# Patient Record
Sex: FEMALE | Race: WHITE | Employment: FULL TIME | URBAN - METROPOLITAN AREA
[De-identification: names, ages, dates, MRNs, and addresses within clinical notes are randomized per-mention and may not be internally consistent; named-entity substitution may affect disease eponyms.]

---

## 2017-01-12 ENCOUNTER — OFFICE VISIT (OUTPATIENT)
Dept: FAMILY MEDICINE CLINIC | Facility: CLINIC | Age: 57
End: 2017-01-12

## 2017-01-12 VITALS
TEMPERATURE: 98 F | BODY MASS INDEX: 43 KG/M2 | HEART RATE: 67 BPM | SYSTOLIC BLOOD PRESSURE: 144 MMHG | DIASTOLIC BLOOD PRESSURE: 98 MMHG | WEIGHT: 211 LBS

## 2017-01-12 DIAGNOSIS — J22 ACUTE RESPIRATORY INFECTION: Primary | ICD-10-CM

## 2017-01-12 DIAGNOSIS — H66.001 ACUTE SUPPURATIVE OTITIS MEDIA OF RIGHT EAR WITHOUT SPONTANEOUS RUPTURE OF TYMPANIC MEMBRANE, RECURRENCE NOT SPECIFIED: ICD-10-CM

## 2017-01-12 PROCEDURE — 99213 OFFICE O/P EST LOW 20 MIN: CPT | Performed by: FAMILY MEDICINE

## 2017-01-12 PROCEDURE — 99212 OFFICE O/P EST SF 10 MIN: CPT | Performed by: FAMILY MEDICINE

## 2017-01-12 RX ORDER — AZITHROMYCIN 250 MG/1
TABLET, FILM COATED ORAL
Qty: 6 TABLET | Refills: 0 | Status: SHIPPED | OUTPATIENT
Start: 2017-01-12 | End: 2017-03-09

## 2017-01-12 RX ORDER — CODEINE PHOSPHATE AND GUAIFENESIN 10; 100 MG/5ML; MG/5ML
5 SOLUTION ORAL EVERY 6 HOURS PRN
Qty: 120 ML | Refills: 1 | Status: SHIPPED | OUTPATIENT
Start: 2017-01-12 | End: 2017-03-09

## 2017-01-12 NOTE — PROGRESS NOTES
2017 1:15 PM    Brittany Jones, : 1960  Patient presents with:  Cough: Patient c/o cough for the past 2 weeks.  Sx include ear congested, chest congestion, sinus pressure, dry cough      HPI:     Brittany Jones is a 55year old female who presents fo Past Surgical History      46       Social History:     Social History   Marital Status:   Spouse Name: N/A    Years of Education: N/A  Number of Children: N/A     Occupational History  None on file     Social History Main Topics Oral Tab, Take two tablets by mouth today, then one tablet daily. , Disp: 6 tablet, Rfl: 0  •  guaiFENesin-codeine (CHERATUSSIN AC) 100-10 MG/5ML Oral Solution, Take 5 mL by mouth every 6 (six) hours as needed for cough. , Disp: 120 mL, Rfl: 1.       Francis Montanez

## 2017-03-09 ENCOUNTER — OFFICE VISIT (OUTPATIENT)
Dept: FAMILY MEDICINE CLINIC | Facility: CLINIC | Age: 57
End: 2017-03-09

## 2017-03-09 VITALS
TEMPERATURE: 98 F | WEIGHT: 205.81 LBS | DIASTOLIC BLOOD PRESSURE: 92 MMHG | SYSTOLIC BLOOD PRESSURE: 142 MMHG | HEIGHT: 59 IN | RESPIRATION RATE: 14 BRPM | HEART RATE: 63 BPM | BODY MASS INDEX: 41.49 KG/M2

## 2017-03-09 DIAGNOSIS — J32.9 SINUSITIS, UNSPECIFIED CHRONICITY, UNSPECIFIED LOCATION: Primary | ICD-10-CM

## 2017-03-09 PROCEDURE — 99212 OFFICE O/P EST SF 10 MIN: CPT | Performed by: FAMILY MEDICINE

## 2017-03-09 PROCEDURE — 99213 OFFICE O/P EST LOW 20 MIN: CPT | Performed by: FAMILY MEDICINE

## 2017-03-09 RX ORDER — FLUTICASONE PROPIONATE 50 MCG
2 SPRAY, SUSPENSION (ML) NASAL DAILY
Qty: 1 BOTTLE | Refills: 0 | Status: SHIPPED | OUTPATIENT
Start: 2017-03-09 | End: 2017-07-07

## 2017-03-09 RX ORDER — AMOXICILLIN AND CLAVULANATE POTASSIUM 875; 125 MG/1; MG/1
1 TABLET, FILM COATED ORAL 2 TIMES DAILY
Qty: 20 TABLET | Refills: 0 | Status: SHIPPED | OUTPATIENT
Start: 2017-03-09 | End: 2017-03-19

## 2017-03-09 NOTE — PROGRESS NOTES
Patient ID: Any Pineda is a 55year old female. HPI  Patient presents with:  Sinusitis: right side   Ear Pain: right side     She states this started at least 5-6 days ago but now the color mucus from the nose is thick and yellow.   It is stuck most th Neurological: Patient is alert and oriented to person, place, and time. Psychiatric: Patient has a normal mood and affect. Vitals reviewed.          ASSESSMENT/PLAN:     Diagnoses and all orders for this visit:    Sinusitis, unspecified chronicity, un

## 2017-09-25 ENCOUNTER — OFFICE VISIT (OUTPATIENT)
Dept: FAMILY MEDICINE CLINIC | Facility: CLINIC | Age: 57
End: 2017-09-25

## 2017-09-25 VITALS
DIASTOLIC BLOOD PRESSURE: 92 MMHG | HEIGHT: 59 IN | TEMPERATURE: 99 F | HEART RATE: 78 BPM | BODY MASS INDEX: 40.52 KG/M2 | WEIGHT: 201 LBS | SYSTOLIC BLOOD PRESSURE: 132 MMHG

## 2017-09-25 DIAGNOSIS — J06.9 UPPER RESPIRATORY TRACT INFECTION, UNSPECIFIED TYPE: Primary | ICD-10-CM

## 2017-09-25 DIAGNOSIS — R05.9 COUGH: ICD-10-CM

## 2017-09-25 DIAGNOSIS — J30.89 OTHER ALLERGIC RHINITIS: ICD-10-CM

## 2017-09-25 PROCEDURE — 99212 OFFICE O/P EST SF 10 MIN: CPT | Performed by: FAMILY MEDICINE

## 2017-09-25 PROCEDURE — 99213 OFFICE O/P EST LOW 20 MIN: CPT | Performed by: FAMILY MEDICINE

## 2017-09-25 RX ORDER — PROMETHAZINE HYDROCHLORIDE AND CODEINE PHOSPHATE 6.25; 1 MG/5ML; MG/5ML
5 SYRUP ORAL EVERY 6 HOURS PRN
Qty: 180 ML | Refills: 0 | Status: SHIPPED | OUTPATIENT
Start: 2017-09-25 | End: 2017-11-28

## 2017-09-25 RX ORDER — FLUTICASONE PROPIONATE 50 MCG
2 SPRAY, SUSPENSION (ML) NASAL DAILY
Qty: 1 BOTTLE | Refills: 3 | Status: SHIPPED | OUTPATIENT
Start: 2017-09-25 | End: 2018-01-23

## 2017-09-25 RX ORDER — DEXAMETHASONE 4 MG/1
4 TABLET ORAL
Qty: 5 TABLET | Refills: 0 | Status: SHIPPED | OUTPATIENT
Start: 2017-09-25 | End: 2017-09-30

## 2017-09-25 NOTE — PROGRESS NOTES
Patient ID: Andrew Chu is a 65year old female. HPI  Patient presents with:  Cold  Cough    She has been sick since 9/21/2017. She states she went to visit her daughter at the hospital and ear congestion was born cold air on her.   She thinks this is Oral    Weight: 201 lb (91.2 kg)    Height: 4' 11\" (1.499 m)      Physical Exam   Constitutional: Patient is oriented to person, place, and time. Patient appears well-developed and well-nourished. No distress. HENT:   Head: Normocephalic.    Right Ear: T breakfast. Best taken at breakfast or lunch. -     Fluticasone Propionate 50 MCG/ACT Nasal Suspension; 2 sprays by Nasal route daily. Cough  -     promethazine-codeine 6.25-10 MG/5ML Oral Syrup;  Take 5 mL by mouth every 6 (six) hours as needed for coug

## 2017-11-28 ENCOUNTER — OFFICE VISIT (OUTPATIENT)
Dept: FAMILY MEDICINE CLINIC | Facility: CLINIC | Age: 57
End: 2017-11-28

## 2017-11-28 ENCOUNTER — APPOINTMENT (OUTPATIENT)
Dept: LAB | Age: 57
End: 2017-11-28
Attending: FAMILY MEDICINE
Payer: COMMERCIAL

## 2017-11-28 ENCOUNTER — HOSPITAL ENCOUNTER (OUTPATIENT)
Dept: GENERAL RADIOLOGY | Age: 57
Discharge: HOME | End: 2017-11-28
Attending: FAMILY MEDICINE
Payer: COMMERCIAL

## 2017-11-28 VITALS
DIASTOLIC BLOOD PRESSURE: 94 MMHG | RESPIRATION RATE: 14 BRPM | BODY MASS INDEX: 40.04 KG/M2 | SYSTOLIC BLOOD PRESSURE: 144 MMHG | HEART RATE: 87 BPM | HEIGHT: 59 IN | TEMPERATURE: 99 F | WEIGHT: 198.63 LBS

## 2017-11-28 DIAGNOSIS — M54.2 POSTERIOR NECK PAIN: ICD-10-CM

## 2017-11-28 DIAGNOSIS — E66.01 MORBID OBESITY DUE TO EXCESS CALORIES (HCC): ICD-10-CM

## 2017-11-28 DIAGNOSIS — Z87.19 HISTORY OF DIVERTICULOSIS: ICD-10-CM

## 2017-11-28 DIAGNOSIS — I10 ESSENTIAL HYPERTENSION: ICD-10-CM

## 2017-11-28 DIAGNOSIS — Z00.00 ADULT GENERAL MEDICAL EXAM: Primary | ICD-10-CM

## 2017-11-28 DIAGNOSIS — Z12.11 SCREENING FOR COLON CANCER: ICD-10-CM

## 2017-11-28 DIAGNOSIS — K63.5 POLYP OF COLON, UNSPECIFIED PART OF COLON, UNSPECIFIED TYPE: ICD-10-CM

## 2017-11-28 DIAGNOSIS — Z23 NEED FOR VACCINATION: ICD-10-CM

## 2017-11-28 PROCEDURE — 90471 IMMUNIZATION ADMIN: CPT | Performed by: FAMILY MEDICINE

## 2017-11-28 PROCEDURE — 93005 ELECTROCARDIOGRAM TRACING: CPT

## 2017-11-28 PROCEDURE — 72050 X-RAY EXAM NECK SPINE 4/5VWS: CPT | Performed by: FAMILY MEDICINE

## 2017-11-28 PROCEDURE — 99396 PREV VISIT EST AGE 40-64: CPT | Performed by: FAMILY MEDICINE

## 2017-11-28 PROCEDURE — 71020 XR CHEST PA + LAT CHEST (CPT=71020): CPT | Performed by: FAMILY MEDICINE

## 2017-11-28 PROCEDURE — 99213 OFFICE O/P EST LOW 20 MIN: CPT | Performed by: FAMILY MEDICINE

## 2017-11-28 PROCEDURE — 93010 ELECTROCARDIOGRAM REPORT: CPT | Performed by: FAMILY MEDICINE

## 2017-11-28 PROCEDURE — 90715 TDAP VACCINE 7 YRS/> IM: CPT | Performed by: FAMILY MEDICINE

## 2017-11-28 RX ORDER — HYDROCHLOROTHIAZIDE 12.5 MG/1
12.5 TABLET ORAL DAILY
Qty: 90 TABLET | Refills: 1 | Status: SHIPPED | OUTPATIENT
Start: 2017-11-28 | End: 2018-05-12

## 2017-11-28 NOTE — PROGRESS NOTES
Patient ID: Leslie Peres is a 65year old female. HPI  Patient presents with:  Routine Physical    Dr. Ariane Chambers did her colonoscopy in 2012. She had 2 colon polyps and diverticulosis. She sees Dr. Nuria Reyes for gynecology.   She had her mammogram and Pap sm nervous/anxious. History reviewed. No pertinent past medical history.     Past Surgical History:  1993:       Social History  Social History   Marital status:   Spouse name: N/A    Years of education: N/A  Number of children: N/A place, and time . She has normal reflexes. No cranial nerve deficit. Skin: Skin is warm and dry. No rash noted. Psychiatric: She has a normal mood and affect  No edema of the legs  Vitals reviewed.     Blood pressure (!) 158/109, pulse 87, temperature 9 Future  She is more worried that she probably has a tumor. She states she gets to be a hypochondriac at times. I told her I do not feel any type of mass and I think this is more likely muscular in nature we will go ahead and do an x-ray of the neck.

## 2017-12-06 ENCOUNTER — TELEPHONE (OUTPATIENT)
Dept: OTHER | Age: 57
End: 2017-12-06

## 2017-12-06 NOTE — TELEPHONE ENCOUNTER
----- Message from Kiersten Lorenzo DO sent at 12/5/2017 10:33 PM CST -----  EKG shows some nonspecific changes but T-wave flattening can sometimes mean that your heart not getting enough oxygen or it could just be a false positive finding.   The cardiologist

## 2017-12-07 NOTE — TELEPHONE ENCOUNTER
Pt states did see EKG result and VS' message on [employer]. Provided pt with phone 3 to call for cardiology appt and pt states will do so. Denies further questions/concerns at this time.

## 2017-12-13 ENCOUNTER — APPOINTMENT (OUTPATIENT)
Dept: GASTROENTEROLOGY | Facility: CLINIC | Age: 57
End: 2017-12-13

## 2017-12-13 DIAGNOSIS — Z86.010 HISTORY OF COLON POLYPS: ICD-10-CM

## 2017-12-13 DIAGNOSIS — Z12.11 COLON CANCER SCREENING: Primary | ICD-10-CM

## 2017-12-13 NOTE — PROGRESS NOTES
Forwarded to [facility]. This pt is not a screening. She previously had colonoscopy with Dr Montana Lim 10/29/12 and had one hyperplastic polyp and one tubular adenoma with a 5 yr recall. She has constipation and has only 2 stools per week.  Pt instructe No      Question: Do you have a history of CVA or Tia? Answer:   No      Question: Do you have a history of uncontrolled HTN, CHF or MI? Answer:   No      Question: Do you have a history of home oxygen use?    Answer:   No      Question: Do you have a h

## 2017-12-18 ENCOUNTER — TELEPHONE (OUTPATIENT)
Dept: OTHER | Age: 57
End: 2017-12-18

## 2017-12-18 NOTE — TELEPHONE ENCOUNTER
LMTCB. May transfer to Triage. Noted pt read result note on 12/16/17 at 4:10 pm--want to make sure has no questions and assist with f/u appt.

## 2017-12-18 NOTE — TELEPHONE ENCOUNTER
----- Message from Cydney Alarcon DO sent at 12/16/2017  4:05 PM CST -----  Unfortunately your diabetic. You are spilling quite a bit of sugar in the urine. Your hemoglobin A1c is above 10. Your sugar is in the 300s.   Your hemoglobin and hematocrit are

## 2018-01-03 ENCOUNTER — HOSPITAL ENCOUNTER (EMERGENCY)
Facility: HOSPITAL | Age: 58
Discharge: HOME | End: 2018-01-03
Payer: COMMERCIAL

## 2018-01-03 ENCOUNTER — TELEPHONE (OUTPATIENT)
Dept: OTHER | Age: 58
End: 2018-01-03

## 2018-01-03 ENCOUNTER — APPOINTMENT (OUTPATIENT)
Dept: GENERAL RADIOLOGY | Facility: HOSPITAL | Age: 58
End: 2018-01-03
Attending: NURSE PRACTITIONER
Payer: COMMERCIAL

## 2018-01-03 ENCOUNTER — APPOINTMENT (OUTPATIENT)
Dept: GENERAL RADIOLOGY | Facility: HOSPITAL | Age: 58
End: 2018-01-03
Payer: COMMERCIAL

## 2018-01-03 VITALS
HEART RATE: 75 BPM | OXYGEN SATURATION: 97 % | SYSTOLIC BLOOD PRESSURE: 147 MMHG | TEMPERATURE: 98 F | HEIGHT: 59 IN | BODY MASS INDEX: 39.92 KG/M2 | DIASTOLIC BLOOD PRESSURE: 97 MMHG | WEIGHT: 198 LBS | RESPIRATION RATE: 18 BRPM

## 2018-01-03 DIAGNOSIS — M25.511 ACUTE PAIN OF RIGHT SHOULDER: Primary | ICD-10-CM

## 2018-01-03 PROCEDURE — 99284 EMERGENCY DEPT VISIT MOD MDM: CPT

## 2018-01-03 PROCEDURE — 96372 THER/PROPH/DIAG INJ SC/IM: CPT

## 2018-01-03 PROCEDURE — 71046 X-RAY EXAM CHEST 2 VIEWS: CPT | Performed by: NURSE PRACTITIONER

## 2018-01-03 PROCEDURE — 73030 X-RAY EXAM OF SHOULDER: CPT

## 2018-01-03 RX ORDER — HYDROCODONE BITARTRATE AND ACETAMINOPHEN 5; 325 MG/1; MG/1
1-2 TABLET ORAL EVERY 4 HOURS PRN
Qty: 15 TABLET | Refills: 0 | Status: SHIPPED | OUTPATIENT
Start: 2018-01-03 | End: 2018-01-10

## 2018-01-03 RX ORDER — KETOROLAC TROMETHAMINE 30 MG/ML
60 INJECTION, SOLUTION INTRAMUSCULAR; INTRAVENOUS ONCE
Status: COMPLETED | OUTPATIENT
Start: 2018-01-03 | End: 2018-01-03

## 2018-01-03 RX ORDER — HYDROCODONE BITARTRATE AND ACETAMINOPHEN 5; 325 MG/1; MG/1
2 TABLET ORAL ONCE
Status: COMPLETED | OUTPATIENT
Start: 2018-01-03 | End: 2018-01-03

## 2018-01-03 NOTE — ED NOTES
Pt c/o right shoulder pain that started on Sunday, worse with movement. No relief s/p 800 mg ibuprofen. Denies injury or trauma, but states went bowling the night before.

## 2018-01-03 NOTE — ED PROVIDER NOTES
Patient Seen in: [facility] AND [facility] Emergency Department    History   Patient presents with:  Upper Extremity Injury (musculoskeletal)    Stated Complaint: R arm \"soreness\" x3 days; no trauma    HPI    Patient presents into the emergency room for evalua the right upper extremity: No clavicular tenderness or step-offs. There is exquisite tenderness to palpation to the anterior aspect of the right shoulder as well as the right deltoid. There is no edema erythema or warmth.   No palpable tenderness to the u

## 2018-01-03 NOTE — TELEPHONE ENCOUNTER
MARYLU pt has appt on 1-5-18  ----- Message from Burak Pino DO sent at 1/3/2018 11:50 AM CST -----  It looks like you have a little calcific tendinitis in her shoulder. Also have some arthritic changes.   They also saw a 1 cm nodule in the right lower

## 2018-01-03 NOTE — ED INITIAL ASSESSMENT (HPI)
Patient presents to ER with c/o right shoulder pain that began Sunday. Patient reports that she did bowl the night before. Patient is unable to lift her arm. Pain worse with movement.

## 2018-01-04 ENCOUNTER — OFFICE VISIT (OUTPATIENT)
Dept: FAMILY MEDICINE CLINIC | Facility: CLINIC | Age: 58
End: 2018-01-04

## 2018-01-04 VITALS
WEIGHT: 198 LBS | DIASTOLIC BLOOD PRESSURE: 96 MMHG | HEIGHT: 59 IN | TEMPERATURE: 99 F | SYSTOLIC BLOOD PRESSURE: 150 MMHG | HEART RATE: 70 BPM | BODY MASS INDEX: 39.92 KG/M2

## 2018-01-04 DIAGNOSIS — M75.31 CALCIFIC TENDINITIS OF RIGHT SHOULDER: ICD-10-CM

## 2018-01-04 DIAGNOSIS — M25.511 PAIN IN JOINT OF RIGHT SHOULDER: ICD-10-CM

## 2018-01-04 DIAGNOSIS — I10 ESSENTIAL HYPERTENSION: ICD-10-CM

## 2018-01-04 DIAGNOSIS — M25.511 ACUTE PAIN OF RIGHT SHOULDER: Primary | ICD-10-CM

## 2018-01-04 PROCEDURE — 20610 DRAIN/INJ JOINT/BURSA W/O US: CPT | Performed by: FAMILY MEDICINE

## 2018-01-04 PROCEDURE — 99214 OFFICE O/P EST MOD 30 MIN: CPT | Performed by: FAMILY MEDICINE

## 2018-01-04 RX ORDER — TRIAMCINOLONE ACETONIDE 40 MG/ML
80 INJECTION, SUSPENSION INTRA-ARTICULAR; INTRAMUSCULAR ONCE
Status: COMPLETED | OUTPATIENT
Start: 2018-01-04 | End: 2018-01-04

## 2018-01-04 RX ADMIN — TRIAMCINOLONE ACETONIDE 80 MG: 40 INJECTION, SUSPENSION INTRA-ARTICULAR; INTRAMUSCULAR at 14:02:00

## 2018-01-04 NOTE — TELEPHONE ENCOUNTER
Pt was inform of  message below and verbalized understanding. She is ok with just coming in for her shoulder pain.  She will see  today at 1 pm

## 2018-01-04 NOTE — TELEPHONE ENCOUNTER
Please advised on Appt for tomorrow Pt stated she's still in Pain was in hospital but no relief  Pt has an Appt for Friday but do not want to wait that long      Spoke with patient (identified name and ), results reviewed and agrees with plan.

## 2018-01-04 NOTE — PROGRESS NOTES
Patient ID: Angelia Grandchild is a 65year old female.     HPI  Patient presents with:  ER F/U: [facility]-Shoulder Pain     Patient Result Comments     Written by Miguel Wilkerson DO on 1/3/2018 11:50 AM   It looks like you have a little calcific tendinitis in her shou Last 6 Encounters:  01/04/18 : 198 lb (89.8 kg)  01/03/18 : 198 lb (89.8 kg)  11/28/17 : 198 lb 9.6 oz (90.1 kg)  09/25/17 : 201 lb (91.2 kg)  03/09/17 : 205 lb 12.8 oz (93.4 kg)  01/12/17 : 211 lb (95.7 kg)      Body mass index is 39.99 kg/m².     BP Readi decreased   Forward Flexion decreased   IR  decreased   ER  decreased   Atrophy None   Bruising None   Strength     Unable to check strength today. She does not want to move her shoulder.   Even if she tries to move 5 or 10° she starts complaining of cooper Her  has ibuprofen 800 mg at home that he does not use. She can take this 3 times daily with food.   I taught her some exercises to do just to keep the range of motion going as she does want to wear the shoulder sling for comfort but I told her not

## 2018-01-04 NOTE — TELEPHONE ENCOUNTER
I am already booked but have her come at 1 PM but it may just be a visit for the shoulder pain and a possible steroid injection into the shoulder.   That usually helps calcific tendinitis the most.  She will need to make a different appointment and to discu

## 2018-01-22 ENCOUNTER — OFFICE VISIT (OUTPATIENT)
Dept: FAMILY MEDICINE CLINIC | Facility: CLINIC | Age: 58
End: 2018-01-22

## 2018-01-22 VITALS
SYSTOLIC BLOOD PRESSURE: 130 MMHG | HEART RATE: 78 BPM | BODY MASS INDEX: 38.51 KG/M2 | TEMPERATURE: 99 F | WEIGHT: 191 LBS | HEIGHT: 59 IN | DIASTOLIC BLOOD PRESSURE: 92 MMHG

## 2018-01-22 DIAGNOSIS — N95.0 POST-MENOPAUSAL BLEEDING: ICD-10-CM

## 2018-01-22 DIAGNOSIS — I10 ESSENTIAL HYPERTENSION: Primary | ICD-10-CM

## 2018-01-22 DIAGNOSIS — E11.65 UNCONTROLLED TYPE 2 DIABETES MELLITUS WITH HYPERGLYCEMIA, WITHOUT LONG-TERM CURRENT USE OF INSULIN (HCC): ICD-10-CM

## 2018-01-22 DIAGNOSIS — M75.31 CALCIFIC TENDINITIS OF RIGHT SHOULDER: ICD-10-CM

## 2018-01-22 DIAGNOSIS — N39.3 URINARY, INCONTINENCE, STRESS FEMALE: ICD-10-CM

## 2018-01-22 PROCEDURE — 99212 OFFICE O/P EST SF 10 MIN: CPT | Performed by: FAMILY MEDICINE

## 2018-01-22 PROCEDURE — 99215 OFFICE O/P EST HI 40 MIN: CPT | Performed by: FAMILY MEDICINE

## 2018-01-22 RX ORDER — LISINOPRIL 5 MG/1
5 TABLET ORAL DAILY
Qty: 30 TABLET | Refills: 3 | Status: SHIPPED | OUTPATIENT
Start: 2018-01-22 | End: 2018-05-30

## 2018-01-22 RX ORDER — HYDROCODONE BITARTRATE AND ACETAMINOPHEN 5; 325 MG/1; MG/1
TABLET ORAL
Refills: 0 | COMMUNITY
Start: 2018-01-03 | End: 2018-02-24

## 2018-01-22 NOTE — PATIENT INSTRUCTIONS
Do labs the first or second week of March at [address] and take your medications with water but no food at morning.

## 2018-01-22 NOTE — PROGRESS NOTES
Patient ID: Karli Ocampo is a 65year old female. HPI  Patient presents with: Follow - Up  Cold    She is here to follow for blood pressure. We did a steroid shot last time for her calcific tendinitis. She is doing well with regard to that.   Her bloo with meals. For diabetes. Disp: 180 tablet Rfl: 1   atorvastatin (LIPITOR) 20 MG Oral Tab Take 1 tablet (20 mg total) by mouth nightly. For cholesterol.  Disp: 90 tablet Rfl: 1   hydrochlorothiazide 12.5 MG Oral Tab Take 1 tablet (12.5 mg total) by mouth da exercises but she would like to see a urologist as well to see if she needs a bladder sling or suspension   Post-menopausal bleeding  she is to see Dr. Sheryl Patel again to find out why she continues to have the postmenopausal bleeding.   Uncontrolled type 2 diab

## 2018-02-01 ENCOUNTER — HOSPITAL ENCOUNTER (OUTPATIENT)
Dept: MAMMOGRAPHY | Age: 58
Discharge: HOME | End: 2018-02-01
Attending: OBSTETRICS & GYNECOLOGY
Payer: COMMERCIAL

## 2018-02-01 DIAGNOSIS — Z12.31 ENCOUNTER FOR SCREENING MAMMOGRAM FOR MALIGNANT NEOPLASM OF BREAST: ICD-10-CM

## 2018-02-01 PROCEDURE — 77067 SCR MAMMO BI INCL CAD: CPT | Performed by: OBSTETRICS & GYNECOLOGY

## 2018-02-24 NOTE — PAT NURSING NOTE
Pt instructed to call Dr King Thomas to verify medication list. Metformin  Is listed as a medication she was supposed to be taking. She states she has never been told to start thid medication.

## 2018-02-28 ENCOUNTER — SURGERY (OUTPATIENT)
Age: 58
End: 2018-02-28

## 2018-02-28 ENCOUNTER — ANESTHESIA (OUTPATIENT)
Dept: SURGERY | Facility: HOSPITAL | Age: 58
End: 2018-02-28
Payer: COMMERCIAL

## 2018-02-28 ENCOUNTER — HOSPITAL ENCOUNTER (OUTPATIENT)
Facility: HOSPITAL | Age: 58
Setting detail: HOSPITAL OUTPATIENT SURGERY
Discharge: HOME | End: 2018-02-28
Attending: OBSTETRICS & GYNECOLOGY | Admitting: OBSTETRICS & GYNECOLOGY
Payer: COMMERCIAL

## 2018-02-28 ENCOUNTER — ANESTHESIA EVENT (OUTPATIENT)
Dept: SURGERY | Facility: HOSPITAL | Age: 58
End: 2018-02-28
Payer: COMMERCIAL

## 2018-02-28 VITALS
SYSTOLIC BLOOD PRESSURE: 133 MMHG | OXYGEN SATURATION: 98 % | DIASTOLIC BLOOD PRESSURE: 85 MMHG | TEMPERATURE: 97 F | HEART RATE: 58 BPM | WEIGHT: 187 LBS | RESPIRATION RATE: 16 BRPM | BODY MASS INDEX: 37.7 KG/M2 | HEIGHT: 59 IN

## 2018-02-28 DIAGNOSIS — N95.0 POST-MENOPAUSAL BLEEDING: Primary | ICD-10-CM

## 2018-02-28 LAB
GLUCOSE BLDC GLUCOMTR-MCNC: 195 MG/DL (ref 70–99)
GLUCOSE BLDC GLUCOMTR-MCNC: 199 MG/DL (ref 70–99)

## 2018-02-28 PROCEDURE — 0UJD8ZZ INSPECTION OF UTERUS AND CERVIX, VIA NATURAL OR ARTIFICIAL OPENING ENDOSCOPIC: ICD-10-PCS | Performed by: OBSTETRICS & GYNECOLOGY

## 2018-02-28 PROCEDURE — 88305 TISSUE EXAM BY PATHOLOGIST: CPT | Performed by: OBSTETRICS & GYNECOLOGY

## 2018-02-28 PROCEDURE — 82962 GLUCOSE BLOOD TEST: CPT

## 2018-02-28 PROCEDURE — 0UDB7ZX EXTRACTION OF ENDOMETRIUM, VIA NATURAL OR ARTIFICIAL OPENING, DIAGNOSTIC: ICD-10-PCS | Performed by: OBSTETRICS & GYNECOLOGY

## 2018-02-28 RX ORDER — IBUPROFEN 400 MG/1
400 TABLET ORAL EVERY 4 HOURS PRN
Status: DISCONTINUED | OUTPATIENT
Start: 2018-02-28 | End: 2018-02-28

## 2018-02-28 RX ORDER — HYDROCODONE BITARTRATE AND ACETAMINOPHEN 5; 325 MG/1; MG/1
1 TABLET ORAL AS NEEDED
Status: DISCONTINUED | OUTPATIENT
Start: 2018-02-28 | End: 2018-02-28

## 2018-02-28 RX ORDER — MORPHINE SULFATE 4 MG/ML
4 INJECTION, SOLUTION INTRAMUSCULAR; INTRAVENOUS EVERY 10 MIN PRN
Status: DISCONTINUED | OUTPATIENT
Start: 2018-02-28 | End: 2018-02-28

## 2018-02-28 RX ORDER — LIDOCAINE HYDROCHLORIDE 10 MG/ML
INJECTION, SOLUTION EPIDURAL; INFILTRATION; INTRACAUDAL; PERINEURAL AS NEEDED
Status: DISCONTINUED | OUTPATIENT
Start: 2018-02-28 | End: 2018-02-28 | Stop reason: SURG

## 2018-02-28 RX ORDER — DEXAMETHASONE SODIUM PHOSPHATE 4 MG/ML
VIAL (ML) INJECTION AS NEEDED
Status: DISCONTINUED | OUTPATIENT
Start: 2018-02-28 | End: 2018-02-28 | Stop reason: SURG

## 2018-02-28 RX ORDER — FAMOTIDINE 20 MG/1
20 TABLET ORAL ONCE
Status: COMPLETED | OUTPATIENT
Start: 2018-02-28 | End: 2018-02-28

## 2018-02-28 RX ORDER — SODIUM CHLORIDE, SODIUM LACTATE, POTASSIUM CHLORIDE, CALCIUM CHLORIDE 600; 310; 30; 20 MG/100ML; MG/100ML; MG/100ML; MG/100ML
INJECTION, SOLUTION INTRAVENOUS CONTINUOUS
Status: DISCONTINUED | OUTPATIENT
Start: 2018-02-28 | End: 2018-02-28

## 2018-02-28 RX ORDER — METOCLOPRAMIDE 10 MG/1
10 TABLET ORAL ONCE
Status: COMPLETED | OUTPATIENT
Start: 2018-02-28 | End: 2018-02-28

## 2018-02-28 RX ORDER — ONDANSETRON 2 MG/ML
4 INJECTION INTRAMUSCULAR; INTRAVENOUS ONCE AS NEEDED
Status: DISCONTINUED | OUTPATIENT
Start: 2018-02-28 | End: 2018-02-28

## 2018-02-28 RX ORDER — SCOLOPAMINE TRANSDERMAL SYSTEM 1 MG/1
1 PATCH, EXTENDED RELEASE TRANSDERMAL
Status: DISCONTINUED | OUTPATIENT
Start: 2018-02-28 | End: 2018-03-03 | Stop reason: HOSPADM

## 2018-02-28 RX ORDER — MIDAZOLAM HYDROCHLORIDE 1 MG/ML
INJECTION INTRAMUSCULAR; INTRAVENOUS AS NEEDED
Status: DISCONTINUED | OUTPATIENT
Start: 2018-02-28 | End: 2018-02-28 | Stop reason: SURG

## 2018-02-28 RX ORDER — IBUPROFEN 200 MG
200 TABLET ORAL EVERY 4 HOURS PRN
Status: DISCONTINUED | OUTPATIENT
Start: 2018-02-28 | End: 2018-02-28

## 2018-02-28 RX ORDER — HYDROCODONE BITARTRATE AND ACETAMINOPHEN 5; 325 MG/1; MG/1
2 TABLET ORAL EVERY 4 HOURS PRN
Status: DISCONTINUED | OUTPATIENT
Start: 2018-02-28 | End: 2018-02-28

## 2018-02-28 RX ORDER — MORPHINE SULFATE 2 MG/ML
2 INJECTION, SOLUTION INTRAMUSCULAR; INTRAVENOUS EVERY 10 MIN PRN
Status: DISCONTINUED | OUTPATIENT
Start: 2018-02-28 | End: 2018-02-28

## 2018-02-28 RX ORDER — ACETAMINOPHEN 325 MG/1
650 TABLET ORAL EVERY 4 HOURS PRN
Status: DISCONTINUED | OUTPATIENT
Start: 2018-02-28 | End: 2018-02-28

## 2018-02-28 RX ORDER — ONDANSETRON 2 MG/ML
4 INJECTION INTRAMUSCULAR; INTRAVENOUS EVERY 6 HOURS PRN
Status: DISCONTINUED | OUTPATIENT
Start: 2018-02-28 | End: 2018-02-28

## 2018-02-28 RX ORDER — HALOPERIDOL 5 MG/ML
0.25 INJECTION INTRAMUSCULAR ONCE AS NEEDED
Status: DISCONTINUED | OUTPATIENT
Start: 2018-02-28 | End: 2018-02-28

## 2018-02-28 RX ORDER — HYDROCODONE BITARTRATE AND ACETAMINOPHEN 5; 325 MG/1; MG/1
1 TABLET ORAL EVERY 4 HOURS PRN
Status: DISCONTINUED | OUTPATIENT
Start: 2018-02-28 | End: 2018-02-28

## 2018-02-28 RX ORDER — HYDROCODONE BITARTRATE AND ACETAMINOPHEN 5; 325 MG/1; MG/1
2 TABLET ORAL AS NEEDED
Status: DISCONTINUED | OUTPATIENT
Start: 2018-02-28 | End: 2018-02-28

## 2018-02-28 RX ORDER — ONDANSETRON 2 MG/ML
INJECTION INTRAMUSCULAR; INTRAVENOUS AS NEEDED
Status: DISCONTINUED | OUTPATIENT
Start: 2018-02-28 | End: 2018-02-28 | Stop reason: SURG

## 2018-02-28 RX ORDER — ACETAMINOPHEN 500 MG
1000 TABLET ORAL ONCE
Status: COMPLETED | OUTPATIENT
Start: 2018-02-28 | End: 2018-02-28

## 2018-02-28 RX ORDER — DEXTROSE MONOHYDRATE 25 G/50ML
50 INJECTION, SOLUTION INTRAVENOUS
Status: DISCONTINUED | OUTPATIENT
Start: 2018-02-28 | End: 2018-02-28

## 2018-02-28 RX ORDER — MORPHINE SULFATE 10 MG/ML
6 INJECTION, SOLUTION INTRAMUSCULAR; INTRAVENOUS EVERY 10 MIN PRN
Status: DISCONTINUED | OUTPATIENT
Start: 2018-02-28 | End: 2018-02-28

## 2018-02-28 RX ORDER — IBUPROFEN 600 MG/1
600 TABLET ORAL EVERY 4 HOURS PRN
Status: DISCONTINUED | OUTPATIENT
Start: 2018-02-28 | End: 2018-02-28

## 2018-02-28 RX ORDER — NALOXONE HYDROCHLORIDE 0.4 MG/ML
80 INJECTION, SOLUTION INTRAMUSCULAR; INTRAVENOUS; SUBCUTANEOUS AS NEEDED
Status: DISCONTINUED | OUTPATIENT
Start: 2018-02-28 | End: 2018-02-28

## 2018-02-28 RX ORDER — METOCLOPRAMIDE HYDROCHLORIDE 5 MG/ML
10 INJECTION INTRAMUSCULAR; INTRAVENOUS EVERY 6 HOURS PRN
Status: DISCONTINUED | OUTPATIENT
Start: 2018-02-28 | End: 2018-02-28

## 2018-02-28 RX ORDER — KETOROLAC TROMETHAMINE 30 MG/ML
INJECTION, SOLUTION INTRAMUSCULAR; INTRAVENOUS AS NEEDED
Status: DISCONTINUED | OUTPATIENT
Start: 2018-02-28 | End: 2018-02-28 | Stop reason: SURG

## 2018-02-28 RX ADMIN — MIDAZOLAM HYDROCHLORIDE 2 MG: 1 INJECTION INTRAMUSCULAR; INTRAVENOUS at 13:59:00

## 2018-02-28 RX ADMIN — DEXAMETHASONE SODIUM PHOSPHATE 8 MG: 4 MG/ML VIAL (ML) INJECTION at 14:11:00

## 2018-02-28 RX ADMIN — SODIUM CHLORIDE, SODIUM LACTATE, POTASSIUM CHLORIDE, CALCIUM CHLORIDE: 600; 310; 30; 20 INJECTION, SOLUTION INTRAVENOUS at 14:02:00

## 2018-02-28 RX ADMIN — KETOROLAC TROMETHAMINE 30 MG: 30 INJECTION, SOLUTION INTRAMUSCULAR; INTRAVENOUS at 14:27:00

## 2018-02-28 RX ADMIN — ONDANSETRON 4 MG: 2 INJECTION INTRAMUSCULAR; INTRAVENOUS at 14:27:00

## 2018-02-28 RX ADMIN — LIDOCAINE HYDROCHLORIDE 50 MG: 10 INJECTION, SOLUTION EPIDURAL; INFILTRATION; INTRACAUDAL; PERINEURAL at 14:06:00

## 2018-02-28 RX ADMIN — SODIUM CHLORIDE, SODIUM LACTATE, POTASSIUM CHLORIDE, CALCIUM CHLORIDE: 600; 310; 30; 20 INJECTION, SOLUTION INTRAVENOUS at 14:32:00

## 2018-02-28 NOTE — ANESTHESIA POSTPROCEDURE EVALUATION
Patient: Yusuf Cooper    Procedure Summary     Date:  02/28/18 Room / Location:  [address] [place] [address] MAIN OR 01 / [address] [facility] MAIN OR    Anesthesia Start:  5557 Anesthesia Stop:  8701    Procedure:  HYSTEROSCOPY DILATION AND CURETTAGE (N/A Vagina ) Diagnosis:  (post menopausal ble

## 2018-02-28 NOTE — H&P
[facility]    PATIENT'S NAME: Jeanette Henson   ATTENDING PHYSICIAN: aGrrick Reed.  Blayne Bernal MD   PATIENT ACCOUNT#:   [id number]    LOCATION:  [facility]  MEDICAL RECORD #:   [id number]       YOB: 1960  ADMISSION DATE:       02/28/2018    HIS Weight 194 pounds. HEENT:  Within normal limits. NECK:  No lymphadenopathy or thyromegaly. LUNGS:  Clear to auscultation bilaterally. HEART:  Regular rate and rhythm without murmurs. ABDOMEN:  Soft, nontender. No masses or organomegaly.   BACK:  No sp

## 2018-02-28 NOTE — OPERATIVE REPORT
[facility] POST ANESTHESIA CARE UNIT  Operative Note     Jorge A Pillai Location: OR   [facility] [id number] MRN [id number]   Admission Date 2/28/2018 Operation Date 2/28/2018   Attending Physician Kayley Modi MD Operating Physician Ilya Mccloud MD Dino Heaton MD  2/28/2018  2:41 PM

## 2018-02-28 NOTE — INTERVAL H&P NOTE
Pre-op Diagnosis: post menopausal bleeding    The above referenced H&P was reviewed by Ra Hinojosa MD on 2/28/2018, the patient was examined and no significant changes have occurred in the patient's condition since the H&P was performed.   I discussed wi

## 2018-02-28 NOTE — ANESTHESIA PREPROCEDURE EVALUATION
Anesthesia PreOp Note    HPI:     Tiffany Kwon is a 65year old female who presents for preoperative consultation requested by: Malka Cardenas MD    Date of Surgery: 2/28/2018    Procedure(s):   HYSTEROSCOPY DILATION AND CURETTAGE  Indication: post menopaus Ordered in Epic:  lactated ringers infusion  Intravenous Continuous Mustapha Estes MD Last Rate: 20 mL/hr at 02/28/18 1340   scopolamine (TRANSDERM-SCOP) 1.5 mg patch 1 patch Transdermal Q72H Marco Young MD 1 patch at 02/28/18 1400     No current Epic- Anesthesia ROS/Med Hx and Physical Exam     Patient summary reviewed and Nursing notes reviewed    Airway   Mallampati: II  TM distance: >3 FB  Neck ROM: full  Dental      Pulmonary - negative ROS and normal exam   Cardiovascular - normal exam  (+) hyperte

## 2018-05-12 DIAGNOSIS — I10 ESSENTIAL HYPERTENSION: ICD-10-CM

## 2018-05-15 RX ORDER — HYDROCHLOROTHIAZIDE 12.5 MG/1
12.5 TABLET ORAL DAILY
Qty: 90 TABLET | Refills: 0 | Status: SHIPPED | OUTPATIENT
Start: 2018-05-15 | End: 2018-08-23

## 2018-05-15 NOTE — TELEPHONE ENCOUNTER
Hypertensive Medications: Refilled per protocol    Protocol Criteria:  · Appointment scheduled in the past 6 months or in the next 3 months  · BMP or CMP in the past 12 months  · Creatinine result < 2  Recent Outpatient Visits            3 months ago [facility]

## 2018-05-30 DIAGNOSIS — I10 ESSENTIAL HYPERTENSION: ICD-10-CM

## 2018-05-30 DIAGNOSIS — E11.65 UNCONTROLLED TYPE 2 DIABETES MELLITUS WITH HYPERGLYCEMIA, WITHOUT LONG-TERM CURRENT USE OF INSULIN (HCC): ICD-10-CM

## 2018-05-31 RX ORDER — LISINOPRIL 5 MG/1
5 TABLET ORAL DAILY
Qty: 30 TABLET | Refills: 3 | Status: SHIPPED | OUTPATIENT
Start: 2018-05-31 | End: 2018-08-23

## 2018-08-20 DIAGNOSIS — I10 ESSENTIAL HYPERTENSION: ICD-10-CM

## 2018-08-20 RX ORDER — HYDROCHLOROTHIAZIDE 12.5 MG/1
12.5 TABLET ORAL DAILY
Qty: 90 TABLET | Refills: 0 | Status: CANCELLED | OUTPATIENT
Start: 2018-08-20

## 2018-08-20 NOTE — TELEPHONE ENCOUNTER
CSS please contact patient to make appt for BP check and route message back to triageRN when the appt is made

## 2018-08-23 ENCOUNTER — OFFICE VISIT (OUTPATIENT)
Dept: FAMILY MEDICINE CLINIC | Facility: CLINIC | Age: 58
End: 2018-08-23
Payer: COMMERCIAL

## 2018-08-23 VITALS
HEART RATE: 75 BPM | HEIGHT: 59 IN | BODY MASS INDEX: 38.3 KG/M2 | TEMPERATURE: 98 F | SYSTOLIC BLOOD PRESSURE: 126 MMHG | WEIGHT: 190 LBS | DIASTOLIC BLOOD PRESSURE: 84 MMHG

## 2018-08-23 DIAGNOSIS — E66.01 MORBID OBESITY DUE TO EXCESS CALORIES (HCC): ICD-10-CM

## 2018-08-23 DIAGNOSIS — Z23 NEED FOR VACCINATION: ICD-10-CM

## 2018-08-23 DIAGNOSIS — E11.65 UNCONTROLLED TYPE 2 DIABETES MELLITUS WITH HYPERGLYCEMIA, WITHOUT LONG-TERM CURRENT USE OF INSULIN (HCC): Primary | ICD-10-CM

## 2018-08-23 DIAGNOSIS — R94.31 ABNORMAL EKG: ICD-10-CM

## 2018-08-23 DIAGNOSIS — I10 ESSENTIAL HYPERTENSION: ICD-10-CM

## 2018-08-23 DIAGNOSIS — E11.65 UNCONTROLLED TYPE 2 DIABETES MELLITUS WITH MICROALBUMINURIA, WITHOUT LONG-TERM CURRENT USE OF INSULIN (HCC): ICD-10-CM

## 2018-08-23 DIAGNOSIS — E11.29 UNCONTROLLED TYPE 2 DIABETES MELLITUS WITH MICROALBUMINURIA, WITHOUT LONG-TERM CURRENT USE OF INSULIN (HCC): ICD-10-CM

## 2018-08-23 DIAGNOSIS — E78.2 MIXED HYPERLIPIDEMIA: ICD-10-CM

## 2018-08-23 DIAGNOSIS — R80.9 UNCONTROLLED TYPE 2 DIABETES MELLITUS WITH MICROALBUMINURIA, WITHOUT LONG-TERM CURRENT USE OF INSULIN (HCC): ICD-10-CM

## 2018-08-23 PROCEDURE — 90732 PPSV23 VACC 2 YRS+ SUBQ/IM: CPT | Performed by: FAMILY MEDICINE

## 2018-08-23 PROCEDURE — 99212 OFFICE O/P EST SF 10 MIN: CPT | Performed by: FAMILY MEDICINE

## 2018-08-23 PROCEDURE — 99215 OFFICE O/P EST HI 40 MIN: CPT | Performed by: FAMILY MEDICINE

## 2018-08-23 PROCEDURE — 90471 IMMUNIZATION ADMIN: CPT | Performed by: FAMILY MEDICINE

## 2018-08-23 RX ORDER — LISINOPRIL 5 MG/1
5 TABLET ORAL DAILY
Qty: 90 TABLET | Refills: 1 | Status: SHIPPED | OUTPATIENT
Start: 2018-08-23 | End: 2019-03-13

## 2018-08-23 RX ORDER — HYDROCHLOROTHIAZIDE 12.5 MG/1
12.5 TABLET ORAL DAILY
Qty: 90 TABLET | Refills: 1 | Status: SHIPPED | OUTPATIENT
Start: 2018-08-23 | End: 2019-03-13

## 2018-08-23 NOTE — PROGRESS NOTES
Patient ID: Tasneem Moulton is a 65year old female.     HPI  Patient presents with:  Medication Request    Patient Result Comments     Viewed by Tasneem Moulton on 12/16/2017  4:13 PM   Written by Sherley Cotton DO on 12/16/2017  4:05 PM   Unfortunately your sayra WBC 6.0 12/14/2017   RBC 5.29 (H) 12/14/2017   HGB 15.6 (H) 12/14/2017   HCT 45.7 (H) 12/14/2017    12/14/2017   MCV 86.4 12/14/2017   MCH 29.5 12/14/2017   MCHC 34.1 12/14/2017   RDW 12.5 12/14/2017   NEUTABS 3,510 12/14/2017   LYMPHABS 1,728 12/ Bert 161 (H) 12/14/2017   CHOLNELLI 5.2 (H) 12/14/2017       TSH W/REFLEX TO FT4 (mIU/L)   Date Value   12/14/2017 3.89   ----------    No results found for: B12, VITB12    No results found for: IRON, IRONTOT    No results found for: SAT    No resu oriented to person, place, and time. Patient appears well-developed and well-nourished. HENT:   Mouth/Throat: Mucous membranes are normal.   Neck: Normal range of motion. Neck supple.  No thyromegaly   Cardiovascular: Normal rate, regular rhythm and nataly PANEL (8)  -     MICROALB/CREAT RATIO, RANDOM URINE  -     CBC WITH DIFFERENTIAL WITH PLATELET  -     HEMOGLOBIN A1C  -     LIPID PANEL  -     OPHTHALMOLOGY - INTERNAL  -     CARDIO - INTERNAL    Mixed hyperlipidemia  -     CARDIO - INTERNAL    Essential h

## 2018-08-28 LAB
ALT: 29 U/L (ref 6–29)
AST: 17 U/L (ref 10–35)
BUN: 15 MG/DL (ref 7–25)
CALCIUM: 9.7 MG/DL (ref 8.6–10.4)
CARBON DIOXIDE: 30 MMOL/L (ref 20–32)
CHLORIDE: 102 MMOL/L (ref 98–110)
CHOL/HDLC RATIO: 4.9 (CALC)
CHOLESTEROL, TOTAL: 219 MG/DL
CREATININE, RANDOM URINE: 217 MG/DL (ref 20–320)
CREATININE: 0.7 MG/DL (ref 0.5–1.05)
EGFR IF AFRICN AM: 112 ML/MIN/1.73M2
EGFR IF NONAFRICN AM: 97 ML/MIN/1.73M2
GLUCOSE: 255 MG/DL (ref 65–99)
HDL CHOLESTEROL: 45 MG/DL
HEMOGLOBIN A1C: 9.3 % OF TOTAL HGB
LDL-CHOLESTEROL: 142 MG/DL (CALC)
MICROALBUMIN/CREATININE RATIO, RANDOM URINE: 3 MCG/MG CREAT
MICROALBUMIN: 0.7 MG/DL
NON-HDL CHOLESTEROL: 174 MG/DL (CALC)
POTASSIUM: 4.8 MMOL/L (ref 3.5–5.3)
SODIUM: 140 MMOL/L (ref 135–146)
TRIGLYCERIDES: 186 MG/DL

## 2018-09-05 ENCOUNTER — TELEPHONE (OUTPATIENT)
Dept: OTHER | Age: 58
End: 2018-09-05

## 2018-09-05 NOTE — TELEPHONE ENCOUNTER
Pt called in for results. Spoke with patient (verified name and ), reviewed information, patient verbalized understanding and agrees with plan.    Reviewed diet modification such as reducing carbohydrates, eating smaller portions and choosing high fiber

## 2018-09-05 NOTE — TELEPHONE ENCOUNTER
Patient Result Comments     Written by Lizbeth Gutiérrez DO on 8/29/2018  5:41 PM   Liver tests are okay. [place] test okay but sugar is much too high at 255.  Your hemoglobin A1c is above 9 and we want this less than 7.  Your cholesterol is way too high for

## 2018-09-10 ENCOUNTER — NURSE TRIAGE (OUTPATIENT)
Dept: FAMILY MEDICINE CLINIC | Facility: CLINIC | Age: 58
End: 2018-09-10

## 2018-09-10 NOTE — TELEPHONE ENCOUNTER
Action Requested: Summary for Provider     []  Critical Lab, Recommendations Needed  [] Need Additional Advice  [x]   FYI    []   Need Orders  [] Need Medications Sent to Pharmacy  []  Other     SUMMARY: Per protocol advised ER now and pt states will be

## 2018-09-11 ENCOUNTER — OFFICE VISIT (OUTPATIENT)
Dept: FAMILY MEDICINE CLINIC | Facility: CLINIC | Age: 58
End: 2018-09-11
Payer: COMMERCIAL

## 2018-09-11 ENCOUNTER — HOSPITAL ENCOUNTER (OUTPATIENT)
Dept: GENERAL RADIOLOGY | Age: 58
Discharge: HOME | End: 2018-09-11
Attending: FAMILY MEDICINE
Payer: COMMERCIAL

## 2018-09-11 VITALS
BODY MASS INDEX: 38.18 KG/M2 | WEIGHT: 189.38 LBS | HEART RATE: 104 BPM | SYSTOLIC BLOOD PRESSURE: 131 MMHG | HEIGHT: 59 IN | TEMPERATURE: 98 F | DIASTOLIC BLOOD PRESSURE: 94 MMHG | RESPIRATION RATE: 14 BRPM

## 2018-09-11 DIAGNOSIS — M25.612 DECREASED RANGE OF MOTION OF LEFT SHOULDER: ICD-10-CM

## 2018-09-11 DIAGNOSIS — M25.512 PAIN IN JOINT OF LEFT SHOULDER: ICD-10-CM

## 2018-09-11 DIAGNOSIS — M25.512 ACUTE PAIN OF LEFT SHOULDER: Primary | ICD-10-CM

## 2018-09-11 DIAGNOSIS — M25.512 ACUTE PAIN OF LEFT SHOULDER: ICD-10-CM

## 2018-09-11 DIAGNOSIS — E66.01 MORBID OBESITY DUE TO EXCESS CALORIES (HCC): ICD-10-CM

## 2018-09-11 PROCEDURE — 73030 X-RAY EXAM OF SHOULDER: CPT | Performed by: FAMILY MEDICINE

## 2018-09-11 PROCEDURE — 99214 OFFICE O/P EST MOD 30 MIN: CPT | Performed by: FAMILY MEDICINE

## 2018-09-11 PROCEDURE — 99212 OFFICE O/P EST SF 10 MIN: CPT | Performed by: FAMILY MEDICINE

## 2018-09-11 PROCEDURE — 20610 DRAIN/INJ JOINT/BURSA W/O US: CPT | Performed by: FAMILY MEDICINE

## 2018-09-11 RX ORDER — TRIAMCINOLONE ACETONIDE 40 MG/ML
80 INJECTION, SUSPENSION INTRA-ARTICULAR; INTRAMUSCULAR ONCE
Status: DISCONTINUED | OUTPATIENT
Start: 2018-09-11 | End: 2018-09-11

## 2018-09-11 NOTE — TELEPHONE ENCOUNTER
I do not see that she went to our emergency room. If she is still having the symptoms please have her see me before 11:20 AM on Tuesday if possible as it looks like I have numerous openings. I do have a meeting at noon.

## 2018-09-11 NOTE — PROGRESS NOTES
Patient ID: Dionicio Webb is a 63year old female.     HPI  Patient presents with:  Pain: left shoulder     Ron Madison RN   Registered Nurse      Telephone Encounter   Signed   Encounter Date:  9/10/2018               Signed                 Action 09/11/18 : 189 lb 6.4 oz (85.9 kg)  08/23/18 : 190 lb (86.2 kg)  02/28/18 : 187 lb (84.8 kg)  01/22/18 : 191 lb (86.6 kg)  01/04/18 : 198 lb (89.8 kg)  01/03/18 : 198 lb (89.8 kg)      BMI Readings from Last 6 Encounters:  09/11/18 : 38.25 kg/m²  08/23/18 Constitutional: Patient is oriented to person, place, and time. Patient appears well-developed and well-nourished. No distress. Vitals have been reviewed.     Blood pressure (!) 131/94, pulse 104, temperature 98.2 °F (36.8 °C), temperature source Oral, ==================================================================================================================================================               ASSESSMENT/PLAN:     Diagnoses and all orders for this visit:    Acute pain of left shoulder  -

## 2018-09-21 ENCOUNTER — OFFICE VISIT (OUTPATIENT)
Dept: OPTOMETRY | Facility: CLINIC | Age: 58
End: 2018-09-21
Payer: COMMERCIAL

## 2018-09-21 DIAGNOSIS — H25.13 AGE-RELATED NUCLEAR CATARACT OF BOTH EYES: ICD-10-CM

## 2018-09-21 DIAGNOSIS — H52.4 PRESBYOPIA: ICD-10-CM

## 2018-09-21 DIAGNOSIS — E11.9 CONTROLLED TYPE 2 DIABETES MELLITUS WITHOUT COMPLICATION, WITHOUT LONG-TERM CURRENT USE OF INSULIN (HCC): Primary | ICD-10-CM

## 2018-09-21 PROCEDURE — 92004 COMPRE OPH EXAM NEW PT 1/>: CPT | Performed by: OPTOMETRIST

## 2018-09-21 NOTE — PROGRESS NOTES
Yusuf Cooper is a 63year old female. HPI:     HPI     Diabetic Eye Exam     Diabetes characteristics include Type 2, controlled with diet and taking oral medications. Duration of 1 month. Number of years on pills: 1 month.   Number of years on insulin 1       Allergies:    Iodine (Topical)        RASH    ROS:     ROS     Negative for: Constitutional, Gastrointestinal, Neurological, Skin, Genitourinary, Musculoskeletal, HENT, Endocrine, Cardiovascular, Eyes, Respiratory, Psychiatric, Allergic/Imm, Heme/L Manifest Refraction Comments    Recommend she stay with her +1.75                  ASSESSMENT/PLAN:     Diagnoses and Plan:     Age-related nuclear cataract of both eyes  No treatment is required. Will continue to observe.     Controlled type 2 diabet

## 2018-10-17 ENCOUNTER — OFFICE VISIT (OUTPATIENT)
Dept: CARDIOLOGY CLINIC | Facility: CLINIC | Age: 58
End: 2018-10-17
Payer: COMMERCIAL

## 2018-10-17 VITALS
SYSTOLIC BLOOD PRESSURE: 121 MMHG | RESPIRATION RATE: 16 BRPM | BODY MASS INDEX: 38.3 KG/M2 | HEIGHT: 59 IN | HEART RATE: 86 BPM | WEIGHT: 190 LBS | DIASTOLIC BLOOD PRESSURE: 87 MMHG

## 2018-10-17 DIAGNOSIS — I10 ESSENTIAL HYPERTENSION: ICD-10-CM

## 2018-10-17 DIAGNOSIS — E11.65 UNCONTROLLED TYPE 2 DIABETES MELLITUS WITH HYPERGLYCEMIA, WITHOUT LONG-TERM CURRENT USE OF INSULIN (HCC): Primary | ICD-10-CM

## 2018-10-17 DIAGNOSIS — R94.31 ABNORMAL EKG: ICD-10-CM

## 2018-10-17 DIAGNOSIS — E78.2 MIXED HYPERLIPIDEMIA: ICD-10-CM

## 2018-10-17 PROCEDURE — 99212 OFFICE O/P EST SF 10 MIN: CPT | Performed by: INTERNAL MEDICINE

## 2018-10-17 PROCEDURE — 99245 OFF/OP CONSLTJ NEW/EST HI 55: CPT | Performed by: INTERNAL MEDICINE

## 2018-10-17 NOTE — PROGRESS NOTES
Cardiology Consult Note    10/17/2018    Dionicio Webb is a 63year old female.   HPI:   64-year-old female with recently diagnosed diabetes severely elevated sugars A1c of 9.3 on metformin also history of hyperlipidemia and hypertension presents for initial well otherwise  SKIN: denies any unusual skin lesions or rashes  RESPIRATORY: denies shortness of breath with exertion  CARDIOVASCULAR: See HPI  GI: denies abdominal pain and denies heartburn  NEURO: denies headaches  All other systems reviewed and negativ

## 2018-10-18 ENCOUNTER — TELEPHONE (OUTPATIENT)
Dept: CARDIOLOGY CLINIC | Facility: CLINIC | Age: 58
End: 2018-10-18

## 2018-10-22 NOTE — TELEPHONE ENCOUNTER
CARD NUC EXERCISE STRESS TEST (CPT=93017/19282) [[id number]]     Dx: Abnormal EKG [R94.31 (ICD-10-CM)];  Essential hypertension [I10 (ICD-10-CM)]

## 2018-10-23 ENCOUNTER — MED REC SCAN ONLY (OUTPATIENT)
Dept: FAMILY MEDICINE CLINIC | Facility: CLINIC | Age: 58
End: 2018-10-23

## 2018-10-25 ENCOUNTER — OFFICE VISIT (OUTPATIENT)
Dept: SURGERY | Facility: CLINIC | Age: 58
End: 2018-10-25
Payer: COMMERCIAL

## 2018-10-25 ENCOUNTER — TELEPHONE (OUTPATIENT)
Dept: SURGERY | Facility: CLINIC | Age: 58
End: 2018-10-25

## 2018-10-25 VITALS
OXYGEN SATURATION: 100 % | RESPIRATION RATE: 18 BRPM | BODY MASS INDEX: 38.32 KG/M2 | DIASTOLIC BLOOD PRESSURE: 83 MMHG | WEIGHT: 190.06 LBS | SYSTOLIC BLOOD PRESSURE: 130 MMHG | HEART RATE: 86 BPM | HEIGHT: 59 IN

## 2018-10-25 DIAGNOSIS — I10 ESSENTIAL HYPERTENSION: ICD-10-CM

## 2018-10-25 DIAGNOSIS — E11.65 UNCONTROLLED TYPE 2 DIABETES MELLITUS WITH HYPERGLYCEMIA, WITHOUT LONG-TERM CURRENT USE OF INSULIN (HCC): Primary | ICD-10-CM

## 2018-10-25 DIAGNOSIS — E78.2 HYPERCHOLESTEROLEMIA WITH HYPERTRIGLYCERIDEMIA: ICD-10-CM

## 2018-10-25 DIAGNOSIS — E66.9 OBESITY (BMI 30-39.9): ICD-10-CM

## 2018-10-25 PROCEDURE — 99244 OFF/OP CNSLTJ NEW/EST MOD 40: CPT | Performed by: INTERNAL MEDICINE

## 2018-10-25 NOTE — PROGRESS NOTES
[facility] Consultation [facility]       Date of Consult:  10/25/2018    Patient:  Jorge A Pillai  :      1960  MRN:      [id number]    Referring Provider: Dr. Nancy Chavez       Chief Complaint:  Patient presents with:  Consult: non surgical taking differently: Take 1,000 mg by mouth 2 (two) times daily with meals. Just taking in the morning  For diabetes. ) Disp: 180 tablet Rfl: 1   atorvastatin (LIPITOR) 20 MG Oral Tab Take 1 tablet (20 mg total) by mouth nightly. For cholesterol.  Disp: 90 t file    Surgical History:    Past Surgical History:   Procedure Laterality Date   •      • DILATION/CURETTAGE,DIAGNOSTIC     • HYSTEROSCOPY DILATION AND CURETTAGE N/A 2018    Performed by Shereen Arriola MD at [address] [place] [address] OR   • REDUCTION LE Fundi benign. Lungs: clear to auscultation bilaterally  Heart: S1, S2 normal, no murmur, click, rub or gallop, regular rate and rhythm  Abdomen: firm, obese, non tender.  large pannus  Extremities: extremities normal, atraumatic, no cyanosis or edema  Puls soda.  3. Increase activity-upper body exercises, walk 10 minutes per day. 4. Increase fruit and vegetable servings to 5-6 per day.       Will start victoza     Continue metformin    Careful with holiday eating    Diagnoses and all orders for this visit:

## 2018-10-25 NOTE — TELEPHONE ENCOUNTER
Brooke Lee referred Geraldo Vargas to Josephine Thrasher can we schedule her with Bunny Mabry. I will be out so can you or Shiva Bronson please call her to schedule.  Thank you

## 2018-10-25 NOTE — TELEPHONE ENCOUNTER
S/W Sam Norwood, verified that no pre authorization is needed per Lisa HANSON, with today  date and time, as reference. Message left on phone and My Chart to schedule test, [phone].

## 2018-10-30 ENCOUNTER — HOSPITAL ENCOUNTER (OUTPATIENT)
Dept: CV DIAGNOSTICS | Facility: HOSPITAL | Age: 58
Discharge: HOME | End: 2018-10-30
Attending: INTERNAL MEDICINE
Payer: COMMERCIAL

## 2018-10-30 ENCOUNTER — HOSPITAL ENCOUNTER (OUTPATIENT)
Dept: NUCLEAR MEDICINE | Facility: HOSPITAL | Age: 58
Discharge: HOME | End: 2018-10-30
Attending: INTERNAL MEDICINE
Payer: COMMERCIAL

## 2018-10-30 DIAGNOSIS — I10 ESSENTIAL HYPERTENSION: ICD-10-CM

## 2018-10-30 DIAGNOSIS — R94.31 ABNORMAL EKG: ICD-10-CM

## 2018-10-30 PROCEDURE — 78452 HT MUSCLE IMAGE SPECT MULT: CPT | Performed by: INTERNAL MEDICINE

## 2018-10-30 PROCEDURE — 93018 CV STRESS TEST I&R ONLY: CPT | Performed by: INTERNAL MEDICINE

## 2018-10-30 PROCEDURE — 93017 CV STRESS TEST TRACING ONLY: CPT | Performed by: INTERNAL MEDICINE

## 2018-10-30 PROCEDURE — 93016 CV STRESS TEST SUPVJ ONLY: CPT | Performed by: INTERNAL MEDICINE

## 2018-10-30 RX ORDER — SODIUM CHLORIDE 9 MG/ML
INJECTION, SOLUTION INTRAVENOUS
Status: COMPLETED
Start: 2018-10-30 | End: 2018-10-30

## 2018-10-30 RX ADMIN — SODIUM CHLORIDE 100 ML: 9 INJECTION, SOLUTION INTRAVENOUS at 09:18:00

## 2018-11-15 ENCOUNTER — OFFICE VISIT (OUTPATIENT)
Dept: FAMILY MEDICINE CLINIC | Facility: CLINIC | Age: 58
End: 2018-11-15
Payer: COMMERCIAL

## 2018-11-15 VITALS
HEART RATE: 93 BPM | SYSTOLIC BLOOD PRESSURE: 125 MMHG | BODY MASS INDEX: 37.13 KG/M2 | DIASTOLIC BLOOD PRESSURE: 87 MMHG | WEIGHT: 184.19 LBS | HEIGHT: 59 IN | TEMPERATURE: 98 F | RESPIRATION RATE: 12 BRPM

## 2018-11-15 DIAGNOSIS — E78.2 HYPERCHOLESTEROLEMIA WITH HYPERTRIGLYCERIDEMIA: ICD-10-CM

## 2018-11-15 DIAGNOSIS — E11.65 UNCONTROLLED TYPE 2 DIABETES MELLITUS WITH HYPERGLYCEMIA, WITHOUT LONG-TERM CURRENT USE OF INSULIN (HCC): Primary | ICD-10-CM

## 2018-11-15 DIAGNOSIS — M62.838 NECK MUSCLE SPASM: ICD-10-CM

## 2018-11-15 DIAGNOSIS — S16.1XXD STRAIN OF NECK MUSCLE, SUBSEQUENT ENCOUNTER: ICD-10-CM

## 2018-11-15 PROCEDURE — 99212 OFFICE O/P EST SF 10 MIN: CPT | Performed by: FAMILY MEDICINE

## 2018-11-15 PROCEDURE — 99214 OFFICE O/P EST MOD 30 MIN: CPT | Performed by: FAMILY MEDICINE

## 2018-11-15 RX ORDER — CYCLOBENZAPRINE HCL 10 MG
10 TABLET ORAL NIGHTLY
Qty: 20 TABLET | Refills: 0 | Status: SHIPPED | OUTPATIENT
Start: 2018-11-15 | End: 2018-12-05

## 2018-11-15 NOTE — PROGRESS NOTES
Patient ID: Anita Ma is a 63year old female. HPI  Patient presents with:  Diabetes: follow up medications    She is seeing Dr. Lidia Olvera for weight loss. She is been on Victoza for 3 weeks.   In the next 3 days she will increase it to the highest dose 08/27/2018       Lab Results   Component Value Date    COLORUR DARK YELLOW 12/14/2017    CLARITY CLEAR 12/14/2017    SPECGRAVITY 1.038 (H) 12/14/2017    GLUUR 3+ (A) 12/14/2017    BILUR NEGATIVE 12/14/2017    KETUR 1+ (A) 12/14/2017    BLOODURINE NEGATIVE Respiratory: Negative for chest tightness and shortness of breath. Cardiovascular: Negative for chest pain and leg swelling. Neurological: Negative for dizziness, syncope, facial asymmetry, light-headedness, numbness and headaches.          Past Medi pressure 125/87, pulse 93, temperature 97.8 °F (36.6 °C), temperature source Oral, resp. rate 12, height 4' 11\" (1.499 m), weight 184 lb 3.2 oz (83.6 kg), not currently breastfeeding.   Physical Exam   Constitutional: Patient is oriented to person, place, Oral Tab; Take 1 tablet (10 mg total) by mouth nightly for 20 days. As needed for muscle relaxation. Can make tired.         Referrals (if applicable)  Orders Placed This Encounter      Diabetic [place]      Physical

## 2018-11-19 ENCOUNTER — OFFICE VISIT (OUTPATIENT)
Dept: SURGERY | Facility: CLINIC | Age: 58
End: 2018-11-19
Payer: COMMERCIAL

## 2018-11-19 VITALS
OXYGEN SATURATION: 100 % | HEART RATE: 93 BPM | HEIGHT: 59 IN | BODY MASS INDEX: 37.5 KG/M2 | RESPIRATION RATE: 16 BRPM | WEIGHT: 186 LBS

## 2018-11-19 DIAGNOSIS — E11.9 CONTROLLED TYPE 2 DIABETES MELLITUS WITHOUT COMPLICATION, WITHOUT LONG-TERM CURRENT USE OF INSULIN (HCC): Primary | ICD-10-CM

## 2018-11-19 DIAGNOSIS — Z51.81 ENCOUNTER FOR THERAPEUTIC DRUG MONITORING: ICD-10-CM

## 2018-11-19 DIAGNOSIS — E78.2 HYPERCHOLESTEROLEMIA WITH HYPERTRIGLYCERIDEMIA: ICD-10-CM

## 2018-11-19 DIAGNOSIS — E66.9 OBESITY (BMI 30-39.9): ICD-10-CM

## 2018-11-19 DIAGNOSIS — I10 ESSENTIAL HYPERTENSION: ICD-10-CM

## 2018-11-19 PROCEDURE — 99214 OFFICE O/P EST MOD 30 MIN: CPT | Performed by: INTERNAL MEDICINE

## 2018-11-19 NOTE — PROGRESS NOTES
[address], [address]  Dept: [phone]       Patient:  Breana Charles  :      1960  MRN:      [id number]    Chief Complaint:  Patient presents with: 1000 MG Oral Tab Take 1 tablet (1,000 mg total) by mouth 2 (two) times daily with meals. For diabetes. (Patient taking differently: Take 1,000 mg by mouth 2 (two) times daily with meals.  Just taking in the morning  For diabetes. ) Disp: 180 tablet Rfl: 1 History Narrative      Not on file    Surgical History:    Past Surgical History:   Procedure Laterality Date   •      • DILATION/CURETTAGE,DIAGNOSTIC     • HYSTEROSCOPY DILATION AND CURETTAGE N/A 2018    Performed by Kush Villatoro MD a negative  Endocrine: negative  All other systems were reviewed and are negative    Physical Exam:   Head: Normocephalic, without obvious abnormality, atraumatic  Eyes: conjunctivae/corneas clear. PERRL, EOM's intact. Fundi benign.   Ears: normal TM's and ex function stable. Lab Results   Component Value Date/Time    CHOLEST 219 (H) 08/27/2018 08:27 AM     (H) 08/27/2018 08:27 AM    HDL 45 (L) 08/27/2018 08:27 AM    TRIG 186 (H) 08/27/2018 08:27 AM       Goals for next month:  1. Keep a food log.   2.

## 2018-12-11 ENCOUNTER — HOSPITAL ENCOUNTER (OUTPATIENT)
Dept: ENDOCRINOLOGY | Facility: HOSPITAL | Age: 58
Discharge: HOME | End: 2018-12-11
Attending: FAMILY MEDICINE
Payer: COMMERCIAL

## 2018-12-11 VITALS — BODY MASS INDEX: 37 KG/M2 | WEIGHT: 181.19 LBS

## 2018-12-11 DIAGNOSIS — E11.9 CONTROLLED TYPE 2 DIABETES MELLITUS WITHOUT COMPLICATION, WITHOUT LONG-TERM CURRENT USE OF INSULIN (HCC): Primary | ICD-10-CM

## 2018-12-11 NOTE — PROGRESS NOTES
Garcíakev Bhagatred  : 1960 attended individual initial assessment for Diabetes Education:    Date: 2018   Start time: 8 End time: 9    HEMOGLOBIN A1c (% of total Hgb)   Date Value   2018 7.1 (H)        Assessment:  Down from 9.4%    Meds:   On low blood sugar (Rule of 15) and actions for lowering high blood glucose levels. Initiated goal setting process and will continue to refine throughout class series. Recommendations:    Monitor blood glucose as directed. Follow Basic Diet Guidelines.

## 2018-12-26 ENCOUNTER — APPOINTMENT (OUTPATIENT)
Dept: ENDOCRINOLOGY | Facility: HOSPITAL | Age: 58
End: 2018-12-26
Attending: FAMILY MEDICINE
Payer: COMMERCIAL

## 2018-12-27 ENCOUNTER — HOSPITAL ENCOUNTER (OUTPATIENT)
Dept: ENDOCRINOLOGY | Facility: HOSPITAL | Age: 58
Discharge: HOME | End: 2018-12-27
Attending: FAMILY MEDICINE
Payer: COMMERCIAL

## 2018-12-27 VITALS — BODY MASS INDEX: 37 KG/M2 | WEIGHT: 184.88 LBS

## 2018-12-27 DIAGNOSIS — E11.9 CONTROLLED TYPE 2 DIABETES MELLITUS WITHOUT COMPLICATION, WITHOUT LONG-TERM CURRENT USE OF INSULIN (HCC): Primary | ICD-10-CM

## 2018-12-27 NOTE — PROGRESS NOTES
Wendy Rubin  : 1960 was seen for Diabetic Education Follow up:    Date: 2018   Start time: 1430 End time: 1500    Assessment:     Assessment: Wt 184 lb 14.4 oz   BMI 37.35 kg/m²      HEMOGLOBIN A1c (% of total Hgb)   Date Value   2018 7

## 2019-01-14 ENCOUNTER — OFFICE VISIT (OUTPATIENT)
Dept: SURGERY | Facility: CLINIC | Age: 59
End: 2019-01-14
Payer: COMMERCIAL

## 2019-01-14 VITALS
HEART RATE: 82 BPM | RESPIRATION RATE: 18 BRPM | DIASTOLIC BLOOD PRESSURE: 76 MMHG | BODY MASS INDEX: 36.5 KG/M2 | OXYGEN SATURATION: 100 % | HEIGHT: 59 IN | WEIGHT: 181.06 LBS | SYSTOLIC BLOOD PRESSURE: 107 MMHG

## 2019-01-14 DIAGNOSIS — E66.9 OBESITY (BMI 30-39.9): ICD-10-CM

## 2019-01-14 DIAGNOSIS — I10 ESSENTIAL HYPERTENSION: ICD-10-CM

## 2019-01-14 DIAGNOSIS — E78.2 HYPERCHOLESTEROLEMIA WITH HYPERTRIGLYCERIDEMIA: ICD-10-CM

## 2019-01-14 DIAGNOSIS — Z51.81 ENCOUNTER FOR THERAPEUTIC DRUG MONITORING: ICD-10-CM

## 2019-01-14 DIAGNOSIS — E11.9 CONTROLLED TYPE 2 DIABETES MELLITUS WITHOUT COMPLICATION, WITHOUT LONG-TERM CURRENT USE OF INSULIN (HCC): Primary | ICD-10-CM

## 2019-01-14 PROCEDURE — 99214 OFFICE O/P EST MOD 30 MIN: CPT | Performed by: INTERNAL MEDICINE

## 2019-01-14 NOTE — PROGRESS NOTES
[address], [place]  [address] [place] [address],[address]  Dept: [phone]       Patient:  Brittany Jones  :      1960  MRN:      [id number]    Chief Complaint:  Patient presents with:       Spouse name: Not on file      Number of children: Not on file      Years of education: Not on file      Highest education level: Not on file    Social Needs      Financial resource strain: Not on file      Food insecurity - worry: Not on file Habits  · Patient states the following:  · Eats 3 meal(s) per day  · Length of time it takes to consume a meal:  20  · # of snacks per day: 1 Type of snacks:  Apple, fruit  · Amount of soda consumption per day:    · Amount of water (in ounces) per day:  64 breakdown or foot ulcers. HYPERTENSION:  The patient's blood pressure has been well controlled. she has been checking it as instructed and has remained in relatively good control.     HYPERCHOLESTEROLEMIA:  The patient states that her cholesterol has be with hypertriglyceridemia    Encounter for therapeutic drug monitoring    Obesity (BMI 30-39. 9)          Arianne Castaneda MD

## 2019-03-02 DIAGNOSIS — E78.2 MIXED HYPERLIPIDEMIA: ICD-10-CM

## 2019-03-02 DIAGNOSIS — E11.65 UNCONTROLLED TYPE 2 DIABETES MELLITUS WITH HYPERGLYCEMIA, WITHOUT LONG-TERM CURRENT USE OF INSULIN (HCC): ICD-10-CM

## 2019-03-02 DIAGNOSIS — E66.01 MORBID OBESITY DUE TO EXCESS CALORIES (HCC): ICD-10-CM

## 2019-03-03 RX ORDER — ATORVASTATIN CALCIUM 20 MG/1
20 TABLET, FILM COATED ORAL NIGHTLY
Qty: 90 TABLET | Refills: 0 | Status: SHIPPED | OUTPATIENT
Start: 2019-03-03 | End: 2019-06-11

## 2019-03-13 DIAGNOSIS — I10 ESSENTIAL HYPERTENSION: ICD-10-CM

## 2019-03-13 DIAGNOSIS — E11.65 UNCONTROLLED TYPE 2 DIABETES MELLITUS WITH HYPERGLYCEMIA, WITHOUT LONG-TERM CURRENT USE OF INSULIN (HCC): ICD-10-CM

## 2019-03-14 RX ORDER — LISINOPRIL 5 MG/1
5 TABLET ORAL DAILY
Qty: 90 TABLET | Refills: 0 | Status: SHIPPED | OUTPATIENT
Start: 2019-03-14 | End: 2019-06-11

## 2019-03-14 RX ORDER — HYDROCHLOROTHIAZIDE 12.5 MG/1
12.5 TABLET ORAL DAILY
Qty: 90 TABLET | Refills: 0 | Status: SHIPPED | OUTPATIENT
Start: 2019-03-14 | End: 2019-06-10

## 2019-03-18 ENCOUNTER — OFFICE VISIT (OUTPATIENT)
Dept: FAMILY MEDICINE CLINIC | Facility: CLINIC | Age: 59
End: 2019-03-18
Payer: COMMERCIAL

## 2019-03-18 VITALS
WEIGHT: 177 LBS | TEMPERATURE: 98 F | HEIGHT: 59 IN | BODY MASS INDEX: 35.68 KG/M2 | SYSTOLIC BLOOD PRESSURE: 112 MMHG | DIASTOLIC BLOOD PRESSURE: 82 MMHG | HEART RATE: 84 BPM

## 2019-03-18 DIAGNOSIS — E11.65 UNCONTROLLED TYPE 2 DIABETES MELLITUS WITH HYPERGLYCEMIA, WITHOUT LONG-TERM CURRENT USE OF INSULIN (HCC): Primary | ICD-10-CM

## 2019-03-18 DIAGNOSIS — R20.2 PARESTHESIA OF HAND, BILATERAL: ICD-10-CM

## 2019-03-18 DIAGNOSIS — E78.2 HYPERCHOLESTEROLEMIA WITH HYPERTRIGLYCERIDEMIA: ICD-10-CM

## 2019-03-18 DIAGNOSIS — I10 ESSENTIAL HYPERTENSION: ICD-10-CM

## 2019-03-18 PROCEDURE — 99214 OFFICE O/P EST MOD 30 MIN: CPT | Performed by: FAMILY MEDICINE

## 2019-03-18 PROCEDURE — 99212 OFFICE O/P EST SF 10 MIN: CPT | Performed by: FAMILY MEDICINE

## 2019-03-18 NOTE — PROGRESS NOTES
Patient ID: Carmen Whitley is a 63year old female. HPI  Patient presents with:  Med Reconcilliation  Diabetes: follow up   She has lost 9 lbs since I saw her November 2018. She took bread and rice out of her diet.  She is seeing Dr. Pérez Durant for weight loss Value Date    GLU 93 12/04/2018    BUN 10 12/04/2018    CREATSERUM 0.81 12/04/2018    GFRAA 93 12/04/2018    GFRNAA 81 12/04/2018    CA 9.6 12/04/2018     12/04/2018    K 4.2 12/04/2018     12/04/2018    CO2 26 12/04/2018       Lab Results   Co kg/m²  11/19/18 : 37.57 kg/m²  11/15/18 : 37.20 kg/m²      BP Readings from Last 6 Encounters:  03/18/19 : 112/82  01/14/19 : 107/76  11/15/18 : 125/87  10/25/18 : 130/83  10/17/18 : 121/87  09/11/18 : (!) 131/94        Review of Systems   Constitutional: PEN NEEDLE MAG U/F) 32G X 4 MM Does not apply Misc USE A NEW NEEDLE WITH EACH USE Disp: 1 Box Rfl: 1   Blood Gluc Meter Disp-Strips Does not apply Device Needs 1 glucometer along with 100 test strips and 100 lancets with 11 refills each.  Check sugars BID see if that helps. Referrals (if applicable)  Orders Placed This Encounter      Podiatry- Dr Xi Campbell          Order Comments:              Nav Cervantes.                            +++++(FOOT DOCTOR)++++++.           Referral Priori

## 2019-04-01 ENCOUNTER — OFFICE VISIT (OUTPATIENT)
Dept: SURGERY | Facility: CLINIC | Age: 59
End: 2019-04-01
Payer: COMMERCIAL

## 2019-04-01 VITALS
HEIGHT: 59 IN | SYSTOLIC BLOOD PRESSURE: 120 MMHG | BODY MASS INDEX: 36.69 KG/M2 | WEIGHT: 182 LBS | RESPIRATION RATE: 16 BRPM | DIASTOLIC BLOOD PRESSURE: 70 MMHG

## 2019-04-01 DIAGNOSIS — E78.2 HYPERCHOLESTEROLEMIA WITH HYPERTRIGLYCERIDEMIA: ICD-10-CM

## 2019-04-01 DIAGNOSIS — E66.9 OBESITY (BMI 30-39.9): ICD-10-CM

## 2019-04-01 DIAGNOSIS — Z51.81 ENCOUNTER FOR THERAPEUTIC DRUG MONITORING: ICD-10-CM

## 2019-04-01 DIAGNOSIS — E11.65 UNCONTROLLED TYPE 2 DIABETES MELLITUS WITH HYPERGLYCEMIA, WITHOUT LONG-TERM CURRENT USE OF INSULIN (HCC): Primary | ICD-10-CM

## 2019-04-01 DIAGNOSIS — I10 ESSENTIAL HYPERTENSION: ICD-10-CM

## 2019-04-01 PROCEDURE — 99214 OFFICE O/P EST MOD 30 MIN: CPT | Performed by: INTERNAL MEDICINE

## 2019-04-01 RX ORDER — TOPIRAMATE 25 MG/1
25 TABLET ORAL EVERY EVENING
Qty: 30 TABLET | Refills: 2 | Status: SHIPPED | OUTPATIENT
Start: 2019-04-01 | End: 2020-03-16

## 2019-04-01 NOTE — PROGRESS NOTES
[address], [address]  Dept: [phone]       Patient:  Carmen Whitley  :      1960  MRN:      [id number]    Chief Complaint:  Patient presents with: (BD PEN NEEDLE MAG U/F) 32G X 4 MM Does not apply Misc USE A NEW NEEDLE WITH EACH USE Disp: 1 Box Rfl: 1   Liraglutide (VICTOZA) 18 MG/3ML Subcutaneous Solution Pen-injector Inject 1.2 mg into the skin daily.  Disp:  Rfl:    Blood Gluc Meter Disp-Strips Do Coffee, 1 cup daily        Occupational Exposure: Not Asked        Hobby Hazards: Not Asked        Sleep Concern: Not Asked        Stress Concern: Not Asked        Weight Concern: Not Asked        Special Diet: Not Asked        Back Care: Not Asked triggers for eating and manage cues and Eat slowly and take 20 to 30 minutes to complete each meal    Exercise Goals Reviewed and Discussed    Needs to start working out    ROS:    Constitutional: positive for fatigue  Respiratory: negative  Cardiovascular Patient is not interested in bariatric surgery. Patient desires to pursue traditional weight loss at this time. DIABETES: Continue current medications. HYPERTENSION: Blood pressure stable on the above medications.  No interval change in antihype

## 2019-06-10 DIAGNOSIS — I10 ESSENTIAL HYPERTENSION: ICD-10-CM

## 2019-06-10 RX ORDER — HYDROCHLOROTHIAZIDE 12.5 MG/1
12.5 TABLET ORAL DAILY
Qty: 90 TABLET | Refills: 1 | Status: SHIPPED | OUTPATIENT
Start: 2019-06-10 | End: 2019-12-28

## 2019-06-11 DIAGNOSIS — I10 ESSENTIAL HYPERTENSION: ICD-10-CM

## 2019-06-11 DIAGNOSIS — E78.2 MIXED HYPERLIPIDEMIA: ICD-10-CM

## 2019-06-11 DIAGNOSIS — E66.01 MORBID OBESITY DUE TO EXCESS CALORIES (HCC): ICD-10-CM

## 2019-06-11 DIAGNOSIS — E11.65 UNCONTROLLED TYPE 2 DIABETES MELLITUS WITH HYPERGLYCEMIA, WITHOUT LONG-TERM CURRENT USE OF INSULIN (HCC): ICD-10-CM

## 2019-06-11 RX ORDER — ATORVASTATIN CALCIUM 20 MG/1
20 TABLET, FILM COATED ORAL NIGHTLY
Qty: 90 TABLET | Refills: 1 | Status: SHIPPED | OUTPATIENT
Start: 2019-06-11 | End: 2020-02-26

## 2019-06-11 RX ORDER — LISINOPRIL 5 MG/1
5 TABLET ORAL DAILY
Qty: 90 TABLET | Refills: 1 | Status: SHIPPED | OUTPATIENT
Start: 2019-06-11 | End: 2020-01-06

## 2019-06-12 NOTE — TELEPHONE ENCOUNTER
Refill passed per [facility], [facility] protocol.   Hypertensive Medications  Protocol Criteria:  · Appointment scheduled in the past 6 months or in the next 3 months  · BMP or CMP in the past 12 months  · Creatinine result < 2  Recent Outpatient Visits 3 months  · ALT & LDL on file in the past 12 months  · ALT result < 80  · LDL result <130   Recent Outpatient Visits            2 months ago Uncontrolled type 2 diabetes mellitus with hyperglycemia, without long-term current use of insulin ([facility]    Ryan diabetes mellitus with hyperglycemia, without long-term current use of insulin [facility])    [address], [address], [place], [place]    Office Visit    4 months ago Controlled type 2 diabetes mellitus without complication, without long-term cur

## 2019-09-05 ENCOUNTER — NURSE TRIAGE (OUTPATIENT)
Dept: FAMILY MEDICINE CLINIC | Facility: CLINIC | Age: 59
End: 2019-09-05

## 2019-09-10 ENCOUNTER — OFFICE VISIT (OUTPATIENT)
Dept: FAMILY MEDICINE CLINIC | Facility: CLINIC | Age: 59
End: 2019-09-10
Payer: COMMERCIAL

## 2019-09-10 VITALS
HEART RATE: 91 BPM | BODY MASS INDEX: 37.57 KG/M2 | DIASTOLIC BLOOD PRESSURE: 79 MMHG | WEIGHT: 186.38 LBS | TEMPERATURE: 100 F | HEIGHT: 59 IN | SYSTOLIC BLOOD PRESSURE: 117 MMHG

## 2019-09-10 DIAGNOSIS — G56.03 BILATERAL CARPAL TUNNEL SYNDROME: ICD-10-CM

## 2019-09-10 DIAGNOSIS — R20.2 PARESTHESIA OF BOTH HANDS: Primary | ICD-10-CM

## 2019-09-10 DIAGNOSIS — M62.542 ATROPHY OF MUSCLE OF LEFT HAND: ICD-10-CM

## 2019-09-10 PROCEDURE — 99214 OFFICE O/P EST MOD 30 MIN: CPT | Performed by: FAMILY MEDICINE

## 2019-09-10 NOTE — PROGRESS NOTES
Patient ID: Evangelina Pierce is a 63year old female. HPI  Patient presents with:  Numbness: on hands/fingers     Pt has been having numbness in her fingers of both hands.  Her left ring finger stays cramped straight when she wakes up and she has to use her mellitus, type 2) ([facility]    • Essential hypertension    • High blood pressure    • High cholesterol    • Hyperlipidemia    • Migraines    • Obesity (BMI 30-39. 9)    • PONV (postoperative nausea and vomiting)        Past Surgical History:   Procedure Lateral time. Normal upper extremity DTR. Skin: Skin is warm. Wrists: Positive Tinel's at the wrists bilaterally with numbness in the fingers. Decreased tone of the left thenar eminence.  Pain with ROM of the left 4th finger but no locking and no trigger finger (if applicable) and agree that the record reflects my personal performance and is accurate and complete.   Osmin Malik DO, 9/10/2019, 12:30 PM

## 2019-09-11 ENCOUNTER — OFFICE VISIT (OUTPATIENT)
Dept: NEUROLOGY | Facility: CLINIC | Age: 59
End: 2019-09-11
Payer: COMMERCIAL

## 2019-09-11 VITALS
HEIGHT: 59 IN | BODY MASS INDEX: 37.5 KG/M2 | WEIGHT: 186 LBS | RESPIRATION RATE: 16 BRPM | DIASTOLIC BLOOD PRESSURE: 80 MMHG | SYSTOLIC BLOOD PRESSURE: 100 MMHG | HEART RATE: 80 BPM

## 2019-09-11 DIAGNOSIS — M65.342 TRIGGER RING FINGER OF LEFT HAND: ICD-10-CM

## 2019-09-11 DIAGNOSIS — M19.041 OSTEOARTHRITIS OF FINGERS OF BOTH HANDS: ICD-10-CM

## 2019-09-11 DIAGNOSIS — M19.042 OSTEOARTHRITIS OF FINGERS OF BOTH HANDS: ICD-10-CM

## 2019-09-11 DIAGNOSIS — G56.03 BILATERAL CARPAL TUNNEL SYNDROME: Primary | ICD-10-CM

## 2019-09-11 PROCEDURE — 99243 OFF/OP CNSLTJ NEW/EST LOW 30: CPT | Performed by: PHYSICAL MEDICINE & REHABILITATION

## 2019-09-11 NOTE — PROGRESS NOTES
[address]  Progress Note    CHIEF COMPLAINT:  Patient presents with:  Hand Pain: Patient presents for bilateral hand pain for the past 3 months, referred by [address]  Patient states she has had constant nu Cancer - liver   • Other (Other) Father         alzheimers   • Diabetes Neg    • Glaucoma Neg        CURRENT MEDICATIONS:     Current Outpatient Medications:  metFORMIN HCl 500 MG Oral Tab Take 500 mg by mouth daily with breakfast.   Disp:  Rfl: admits   Peripheral Vascular  Swelling of Legs/Feet: denies  Cold Extremities: denies   Skin  Open Sores: denies  Nodules or Lumps: denies  Rash: denies   Neurological  Loss of Strength Since last Visit: admits(both hands)  Tingling/Numbness: admits  [employer] fingers of both hands  I recommend using OTC Aspercreme as directed. .        RTC:    Return in about 4 weeks (around 10/9/2019). Discharge Instructions were provided as documented in AVS summary.   The patient was in agreement with the assessment and

## 2019-09-11 NOTE — PATIENT INSTRUCTIONS
1. Buy carpal tunnel splints (small) right and left at [facility]  2. Stretch fingers regularly  3. Use Aspercreme on fingers as directed.

## 2019-12-28 DIAGNOSIS — I10 ESSENTIAL HYPERTENSION: ICD-10-CM

## 2019-12-28 RX ORDER — HYDROCHLOROTHIAZIDE 12.5 MG/1
12.5 TABLET ORAL DAILY
Qty: 90 TABLET | Refills: 1 | Status: SHIPPED | OUTPATIENT
Start: 2019-12-28 | End: 2020-07-12

## 2019-12-28 NOTE — TELEPHONE ENCOUNTER
Refill passed per [facility] protocol.     Hypertensive Medications  Protocol Criteria:  · Appointment scheduled in the past 6 months or in the next 3 months  · BMP or CMP in the past 12 months  · Creatinine result < 2  Recent Outpatient Visits

## 2020-01-05 DIAGNOSIS — I10 ESSENTIAL HYPERTENSION: ICD-10-CM

## 2020-01-05 DIAGNOSIS — E11.65 UNCONTROLLED TYPE 2 DIABETES MELLITUS WITH HYPERGLYCEMIA, WITHOUT LONG-TERM CURRENT USE OF INSULIN (HCC): ICD-10-CM

## 2020-01-06 RX ORDER — LISINOPRIL 5 MG/1
5 TABLET ORAL DAILY
Qty: 90 TABLET | Refills: 1 | Status: SHIPPED | OUTPATIENT
Start: 2020-01-06 | End: 2020-05-11

## 2020-02-26 DIAGNOSIS — E78.2 MIXED HYPERLIPIDEMIA: ICD-10-CM

## 2020-02-26 NOTE — TELEPHONE ENCOUNTER
Patients is requesting refill for Cholesterol medication. Follow up appointment is on 3/16    Patient states has been out of the medication for over a week.

## 2020-02-27 RX ORDER — ATORVASTATIN CALCIUM 20 MG/1
20 TABLET, FILM COATED ORAL NIGHTLY
Qty: 30 TABLET | Refills: 0 | Status: SHIPPED | OUTPATIENT
Start: 2020-02-27 | End: 2020-03-16

## 2020-02-27 NOTE — TELEPHONE ENCOUNTER
Please review; protocol failed. Requested Prescriptions     Pending Prescriptions Disp Refills   • atorvastatin 20 MG Oral Tab 90 tablet 1     Sig: Take 1 tablet (20 mg total) by mouth nightly. For cholesterol.          Recent Visits  Date Type Provider

## 2020-03-16 ENCOUNTER — OFFICE VISIT (OUTPATIENT)
Dept: FAMILY MEDICINE CLINIC | Facility: CLINIC | Age: 60
End: 2020-03-16
Payer: COMMERCIAL

## 2020-03-16 VITALS
TEMPERATURE: 99 F | WEIGHT: 190 LBS | DIASTOLIC BLOOD PRESSURE: 70 MMHG | HEIGHT: 59 IN | SYSTOLIC BLOOD PRESSURE: 110 MMHG | BODY MASS INDEX: 38.3 KG/M2 | HEART RATE: 75 BPM

## 2020-03-16 DIAGNOSIS — Z86.010 HISTORY OF COLON POLYPS: ICD-10-CM

## 2020-03-16 DIAGNOSIS — I10 ESSENTIAL HYPERTENSION: ICD-10-CM

## 2020-03-16 DIAGNOSIS — E78.2 MIXED HYPERLIPIDEMIA: ICD-10-CM

## 2020-03-16 DIAGNOSIS — E11.9 CONTROLLED TYPE 2 DIABETES MELLITUS WITHOUT COMPLICATION, WITHOUT LONG-TERM CURRENT USE OF INSULIN (HCC): Primary | ICD-10-CM

## 2020-03-16 DIAGNOSIS — Z12.11 SCREENING FOR COLON CANCER: ICD-10-CM

## 2020-03-16 PROCEDURE — 99214 OFFICE O/P EST MOD 30 MIN: CPT | Performed by: FAMILY MEDICINE

## 2020-03-16 RX ORDER — ATORVASTATIN CALCIUM 20 MG/1
20 TABLET, FILM COATED ORAL NIGHTLY
Qty: 90 TABLET | Refills: 0 | Status: SHIPPED | OUTPATIENT
Start: 2020-03-16 | End: 2020-05-18

## 2020-03-16 NOTE — PROGRESS NOTES
Patient ID: Leslie Peres is a 63year old female. HPI  Patient presents with:  Hypertension  Diabetes    Last seen by me on 9/10/19. Present today with her . I reviewed labs with the pt. Her last A1C was 6.0 on 4/22/19.  Patient denies any ch 101 04/22/2019    CO2 30 04/22/2019       Lab Results   Component Value Date     (H) 04/22/2019    BUN 21 04/22/2019    CREATSERUM 0.75 04/22/2019    GFRAA 103 04/22/2019    GFRNAA 88 04/22/2019    CA 9.7 04/22/2019     04/22/2019    K 3.8 04/ diaphoresis and fever. HENT: Negative for voice change. Respiratory: Negative for chest tightness and shortness of breath. Cardiovascular: Negative for chest pain. Gastrointestinal: Negative for abdominal pain. Skin: Negative for color change. place, and time. Patient appears well-developed and well-nourished. No distress. Head: Normocephalic. Eyes: Conjunctivae and EOM are normal.   Neck: Normal range of motion. No thyromegaly present.    Cardiovascular: Normal rate, regular rhythm and nataly Referred to Provider:Alexis Johnston OD          Requested Specialty:OPTOMETRY          Number of Visits Requested:3      Gastro Referral - [facility] [place]          Referral Priority:Routine          Referral Type:OFFICE VISIT          Referred to Provider:Camilo

## 2020-04-30 ENCOUNTER — TELEPHONE (OUTPATIENT)
Dept: OPTOMETRY | Facility: CLINIC | Age: 60
End: 2020-04-30

## 2020-04-30 NOTE — TELEPHONE ENCOUNTER
Spoke with patient. She is OK with cancelling her EE appointment for 5/4/20 at 9 am and she will call back in a few weeks to RS.

## 2020-05-11 DIAGNOSIS — I10 ESSENTIAL HYPERTENSION: ICD-10-CM

## 2020-05-11 DIAGNOSIS — E11.65 UNCONTROLLED TYPE 2 DIABETES MELLITUS WITH HYPERGLYCEMIA, WITHOUT LONG-TERM CURRENT USE OF INSULIN (HCC): ICD-10-CM

## 2020-05-11 RX ORDER — LISINOPRIL 5 MG/1
5 TABLET ORAL DAILY
Qty: 90 TABLET | Refills: 1 | Status: SHIPPED | OUTPATIENT
Start: 2020-05-11 | End: 2020-11-08

## 2020-05-18 DIAGNOSIS — I10 ESSENTIAL HYPERTENSION: ICD-10-CM

## 2020-05-18 DIAGNOSIS — E11.65 UNCONTROLLED TYPE 2 DIABETES MELLITUS WITH HYPERGLYCEMIA, WITHOUT LONG-TERM CURRENT USE OF INSULIN (HCC): ICD-10-CM

## 2020-05-18 DIAGNOSIS — E78.2 MIXED HYPERLIPIDEMIA: ICD-10-CM

## 2020-05-18 RX ORDER — LISINOPRIL 5 MG/1
5 TABLET ORAL DAILY
Qty: 90 TABLET | Refills: 1 | Status: CANCELLED | OUTPATIENT
Start: 2020-05-18

## 2020-05-18 RX ORDER — ATORVASTATIN CALCIUM 20 MG/1
20 TABLET, FILM COATED ORAL NIGHTLY
Qty: 90 TABLET | Refills: 0 | Status: SHIPPED | OUTPATIENT
Start: 2020-05-18 | End: 2020-10-02

## 2020-07-05 DIAGNOSIS — E11.65 UNCONTROLLED TYPE 2 DIABETES MELLITUS WITH HYPERGLYCEMIA, WITHOUT LONG-TERM CURRENT USE OF INSULIN (HCC): ICD-10-CM

## 2020-07-05 DIAGNOSIS — E66.01 MORBID OBESITY DUE TO EXCESS CALORIES (HCC): ICD-10-CM

## 2020-07-10 DIAGNOSIS — I10 ESSENTIAL HYPERTENSION: ICD-10-CM

## 2020-07-10 NOTE — TELEPHONE ENCOUNTER
Patient calling to check the status of her medication she states she is out of the medication         Please advise   [phone]

## 2020-07-12 RX ORDER — HYDROCHLOROTHIAZIDE 12.5 MG/1
12.5 TABLET ORAL DAILY
Qty: 90 TABLET | Refills: 1 | Status: SHIPPED | OUTPATIENT
Start: 2020-07-12 | End: 2021-01-06

## 2020-08-05 ENCOUNTER — HOSPITAL ENCOUNTER (OUTPATIENT)
Dept: MAMMOGRAPHY | Age: 60
Discharge: HOME | End: 2020-08-05
Attending: OBSTETRICS & GYNECOLOGY
Payer: COMMERCIAL

## 2020-08-05 DIAGNOSIS — Z12.31 ENCOUNTER FOR SCREENING MAMMOGRAM FOR MALIGNANT NEOPLASM OF BREAST: ICD-10-CM

## 2020-08-05 PROCEDURE — 77063 BREAST TOMOSYNTHESIS BI: CPT | Performed by: OBSTETRICS & GYNECOLOGY

## 2020-08-05 PROCEDURE — 77067 SCR MAMMO BI INCL CAD: CPT | Performed by: OBSTETRICS & GYNECOLOGY

## 2020-10-01 DIAGNOSIS — E78.2 MIXED HYPERLIPIDEMIA: ICD-10-CM

## 2020-10-02 RX ORDER — ATORVASTATIN CALCIUM 20 MG/1
20 TABLET, FILM COATED ORAL NIGHTLY
Qty: 90 TABLET | Refills: 0 | Status: SHIPPED | OUTPATIENT
Start: 2020-10-02 | End: 2021-01-04

## 2020-10-06 DIAGNOSIS — E11.65 UNCONTROLLED TYPE 2 DIABETES MELLITUS WITH HYPERGLYCEMIA, WITHOUT LONG-TERM CURRENT USE OF INSULIN (HCC): ICD-10-CM

## 2020-10-06 DIAGNOSIS — E66.01 MORBID OBESITY DUE TO EXCESS CALORIES (HCC): ICD-10-CM

## 2020-10-12 NOTE — TELEPHONE ENCOUNTER
Pt called back and was informed of Dr. Matti Becerra message below and she verbalized understanding. Pt losing her job so she needs a appt ASAP.  Thanks       Future Appointments   Date Time Provider Dimitris Scales   10/15/2020  3:30 PM DO SON Griffith

## 2020-10-15 ENCOUNTER — OFFICE VISIT (OUTPATIENT)
Dept: FAMILY MEDICINE CLINIC | Facility: CLINIC | Age: 60
End: 2020-10-15
Payer: COMMERCIAL

## 2020-10-15 VITALS
DIASTOLIC BLOOD PRESSURE: 70 MMHG | TEMPERATURE: 98 F | BODY MASS INDEX: 39.75 KG/M2 | WEIGHT: 197.19 LBS | HEIGHT: 59 IN | HEART RATE: 80 BPM | SYSTOLIC BLOOD PRESSURE: 110 MMHG

## 2020-10-15 DIAGNOSIS — R30.0 DYSURIA: ICD-10-CM

## 2020-10-15 DIAGNOSIS — E78.2 MIXED HYPERLIPIDEMIA: ICD-10-CM

## 2020-10-15 DIAGNOSIS — M54.6 ACUTE BILATERAL THORACIC BACK PAIN: ICD-10-CM

## 2020-10-15 DIAGNOSIS — I10 ESSENTIAL HYPERTENSION: ICD-10-CM

## 2020-10-15 DIAGNOSIS — M54.2 NECK PAIN: ICD-10-CM

## 2020-10-15 DIAGNOSIS — E11.9 CONTROLLED TYPE 2 DIABETES MELLITUS WITHOUT COMPLICATION, WITHOUT LONG-TERM CURRENT USE OF INSULIN (HCC): Primary | ICD-10-CM

## 2020-10-15 PROCEDURE — 3078F DIAST BP <80 MM HG: CPT | Performed by: FAMILY MEDICINE

## 2020-10-15 PROCEDURE — 3008F BODY MASS INDEX DOCD: CPT | Performed by: FAMILY MEDICINE

## 2020-10-15 PROCEDURE — 99214 OFFICE O/P EST MOD 30 MIN: CPT | Performed by: FAMILY MEDICINE

## 2020-10-15 PROCEDURE — 3074F SYST BP LT 130 MM HG: CPT | Performed by: FAMILY MEDICINE

## 2020-10-15 RX ORDER — HYDROCHLOROTHIAZIDE 12.5 MG/1
12.5 CAPSULE, GELATIN COATED ORAL DAILY
COMMUNITY
End: 2021-04-01

## 2020-10-15 NOTE — PROGRESS NOTES
Patient ID: Karli Ocampo is a 62year old female. HPI  Patient presents with:  Diabetes: follow up    Last seen by me on 3/16/2020. Pt states that since the pandemic began she has been over-eating.  Pt states that since she has been over-eating, she h 04/01/2020     04/01/2020    CO2 31 04/01/2020       Lab Results   Component Value Date     (H) 04/01/2020    BUN 16 04/01/2020    CREATSERUM 0.82 04/01/2020    GFRAA 91 04/01/2020    GFRNAA 79 04/01/2020    CA 9.9 04/01/2020     04/01/2 112/82        Review of Systems   Constitutional: Negative for diaphoresis. Respiratory: Negative for shortness of breath. Cardiovascular: Negative for chest pain. Genitourinary: Positive for dysuria and frequency. Negative for hematuria.    Musculos temperature source Tympanic, height 4' 11\" (1.499 m), weight 197 lb 3.2 oz (89.4 kg), last menstrual period 09/01/2014, not currently breastfeeding.      10/15/20  1524 10/15/20  1545   BP: 136/86 110/70   Pulse: 80    Temp: 97.5 °F (36.4 °C)    TempSrc: T her as she drinks quite a bit of water. I will go ahead and add a urine to the labs.       Referrals (if applicable)  Orders Placed This Encounter      Optometry - Dr Dennis Yeung          Order Comments:              +++++EYE DOCTOR+++++

## 2020-10-16 ENCOUNTER — LAB ENCOUNTER (OUTPATIENT)
Dept: LAB | Age: 60
End: 2020-10-16
Attending: FAMILY MEDICINE
Payer: COMMERCIAL

## 2020-10-16 DIAGNOSIS — E11.9 CONTROLLED TYPE 2 DIABETES MELLITUS WITHOUT COMPLICATION, WITHOUT LONG-TERM CURRENT USE OF INSULIN (HCC): ICD-10-CM

## 2020-10-16 DIAGNOSIS — R30.0 DYSURIA: ICD-10-CM

## 2020-10-16 PROCEDURE — 81003 URINALYSIS AUTO W/O SCOPE: CPT

## 2020-10-16 PROCEDURE — 36415 COLL VENOUS BLD VENIPUNCTURE: CPT

## 2020-10-16 PROCEDURE — 80048 BASIC METABOLIC PNL TOTAL CA: CPT

## 2020-10-16 PROCEDURE — 83036 HEMOGLOBIN GLYCOSYLATED A1C: CPT

## 2020-11-08 DIAGNOSIS — I10 ESSENTIAL HYPERTENSION: ICD-10-CM

## 2020-11-08 DIAGNOSIS — E11.65 UNCONTROLLED TYPE 2 DIABETES MELLITUS WITH HYPERGLYCEMIA, WITHOUT LONG-TERM CURRENT USE OF INSULIN (HCC): ICD-10-CM

## 2020-11-08 RX ORDER — LISINOPRIL 5 MG/1
5 TABLET ORAL DAILY
Qty: 90 TABLET | Refills: 1 | Status: SHIPPED | OUTPATIENT
Start: 2020-11-08 | End: 2021-04-01

## 2020-12-29 LAB — AMB EXT COVID-19 RESULT: DETECTED

## 2020-12-31 ENCOUNTER — TELEPHONE (OUTPATIENT)
Dept: FAMILY MEDICINE CLINIC | Facility: CLINIC | Age: 60
End: 2020-12-31

## 2020-12-31 NOTE — TELEPHONE ENCOUNTER
Patient states since Monday, 12/28, cough, congestion, body aches, sweats. Patient took a Covid test same day in [place] and informed on Tuesday, 12/29, she was positive. Patient reports she continues to sweat a lot, but does not have a fever.   Ivonne

## 2021-01-02 NOTE — TELEPHONE ENCOUNTER
Triage team will monitor patient . Please DO NOT close this encounter. What  was your temp today? - no    How did you take your temp? Are you feeling short of breath today?    No      Is the shortness of breath better, the same, or worse than

## 2021-01-04 DIAGNOSIS — E78.2 MIXED HYPERLIPIDEMIA: ICD-10-CM

## 2021-01-04 RX ORDER — ATORVASTATIN CALCIUM 20 MG/1
20 TABLET, FILM COATED ORAL NIGHTLY
Qty: 90 TABLET | Refills: 0 | Status: SHIPPED | OUTPATIENT
Start: 2021-01-04 | End: 2021-01-14

## 2021-01-05 NOTE — TELEPHONE ENCOUNTER
What was your temp today? Not checked; denies fever since symptoms started. Advised per CDC guidelines should check twice daily to help determine when no longer contagious.      How did you take your temp?     without a thermometer    Are you feeling short

## 2021-01-06 DIAGNOSIS — I10 ESSENTIAL HYPERTENSION: ICD-10-CM

## 2021-01-06 RX ORDER — HYDROCHLOROTHIAZIDE 12.5 MG/1
12.5 TABLET ORAL DAILY
Qty: 90 TABLET | Refills: 1 | Status: SHIPPED | OUTPATIENT
Start: 2021-01-06 | End: 2021-04-06

## 2021-01-12 DIAGNOSIS — E78.2 MIXED HYPERLIPIDEMIA: ICD-10-CM

## 2021-01-14 RX ORDER — ATORVASTATIN CALCIUM 20 MG/1
20 TABLET, FILM COATED ORAL NIGHTLY
Qty: 90 TABLET | Refills: 0 | Status: SHIPPED | OUTPATIENT
Start: 2021-01-14 | End: 2021-04-01

## 2021-02-18 ENCOUNTER — TELEPHONE (OUTPATIENT)
Dept: FAMILY MEDICINE CLINIC | Facility: CLINIC | Age: 61
End: 2021-02-18

## 2021-02-18 NOTE — TELEPHONE ENCOUNTER
Patient indicated that received COVID moderna vaccine on 2/15/2021 at [facility]. No issues or concerns. Immunizations updated.

## 2021-04-01 ENCOUNTER — OFFICE VISIT (OUTPATIENT)
Dept: FAMILY MEDICINE CLINIC | Facility: CLINIC | Age: 61
End: 2021-04-01
Payer: COMMERCIAL

## 2021-04-01 VITALS
DIASTOLIC BLOOD PRESSURE: 85 MMHG | HEIGHT: 59 IN | BODY MASS INDEX: 38.63 KG/M2 | HEART RATE: 77 BPM | WEIGHT: 191.63 LBS | SYSTOLIC BLOOD PRESSURE: 122 MMHG | TEMPERATURE: 98 F

## 2021-04-01 DIAGNOSIS — E11.65 UNCONTROLLED TYPE 2 DIABETES MELLITUS WITH HYPERGLYCEMIA, WITHOUT LONG-TERM CURRENT USE OF INSULIN (HCC): ICD-10-CM

## 2021-04-01 DIAGNOSIS — Z23 NEED FOR VACCINATION: ICD-10-CM

## 2021-04-01 DIAGNOSIS — G89.29 CHRONIC BILATERAL THORACIC BACK PAIN: ICD-10-CM

## 2021-04-01 DIAGNOSIS — E78.2 MIXED HYPERLIPIDEMIA: ICD-10-CM

## 2021-04-01 DIAGNOSIS — Z00.00 ADULT GENERAL MEDICAL EXAM: Primary | ICD-10-CM

## 2021-04-01 DIAGNOSIS — Z12.11 SCREENING FOR COLON CANCER: ICD-10-CM

## 2021-04-01 DIAGNOSIS — I10 ESSENTIAL HYPERTENSION: ICD-10-CM

## 2021-04-01 DIAGNOSIS — Z86.16 HISTORY OF COVID-19: ICD-10-CM

## 2021-04-01 DIAGNOSIS — M54.6 CHRONIC BILATERAL THORACIC BACK PAIN: ICD-10-CM

## 2021-04-01 PROCEDURE — 3008F BODY MASS INDEX DOCD: CPT | Performed by: FAMILY MEDICINE

## 2021-04-01 PROCEDURE — 90750 HZV VACC RECOMBINANT IM: CPT | Performed by: FAMILY MEDICINE

## 2021-04-01 PROCEDURE — 90471 IMMUNIZATION ADMIN: CPT | Performed by: FAMILY MEDICINE

## 2021-04-01 PROCEDURE — 99212 OFFICE O/P EST SF 10 MIN: CPT | Performed by: FAMILY MEDICINE

## 2021-04-01 PROCEDURE — 3079F DIAST BP 80-89 MM HG: CPT | Performed by: FAMILY MEDICINE

## 2021-04-01 PROCEDURE — 99396 PREV VISIT EST AGE 40-64: CPT | Performed by: FAMILY MEDICINE

## 2021-04-01 PROCEDURE — 3074F SYST BP LT 130 MM HG: CPT | Performed by: FAMILY MEDICINE

## 2021-04-01 RX ORDER — ATORVASTATIN CALCIUM 20 MG/1
20 TABLET, FILM COATED ORAL NIGHTLY
Qty: 90 TABLET | Refills: 0 | Status: SHIPPED | OUTPATIENT
Start: 2021-04-01 | End: 2021-05-01

## 2021-04-01 RX ORDER — LISINOPRIL 5 MG/1
5 TABLET ORAL DAILY
Qty: 90 TABLET | Refills: 1 | Status: SHIPPED | OUTPATIENT
Start: 2021-04-01 | End: 2021-10-12

## 2021-04-01 NOTE — PROGRESS NOTES
Patient ID: Destin Hays is a 62year old female. HPI  Patient presents with:  Routine Physical    Last seen by me on 10/15/2020. Pt is not working at this time. Hx of COVID-19 in December 2020.  Pt c/o pain and a burning sensation in the mid-back GFRNAA 67 10/16/2020    GFRAA 78 10/16/2020    CA 10.1 10/16/2020    OSMOCALC 302 (H) 10/16/2020    ALKPHO 92 12/14/2017    AST 18 04/01/2020    ALT 22 04/01/2020    BILT 0.9 12/14/2017    TP 7.4 12/14/2017    ALB 4.2 12/14/2017    GLOBULT 3.2 12/14/2017 from Last 6 Encounters:  04/01/21 : 191 lb 9.6 oz  10/15/20 : 197 lb 3.2 oz  03/16/20 : 190 lb  09/11/19 : 186 lb  09/10/19 : 186 lb 6.4 oz  04/01/19 : 182 lb              BMI Readings from Last 6 Encounters:  04/01/21 : 38.70 kg/m²  10/15/20 : 39.83 kg/m² 1 cup daily        Occupational Exposure: Not Asked        Hobby Hazards: Not Asked        Sleep Concern: Not Asked        Stress Concern: Not Asked        Weight Concern: Not Asked        Special Diet: Not Asked        Back Care: Not Asked        Exercise refills each. Check sugars BID and prn. 1 Device 0     Allergies:  Iodine (Topical)        RASH   PHYSICAL EXAM:   Physical Exam    Physical Exam   Constitutional: . She appears well-developed and well-nourished. No distress. Head: Normocephalic.    Right type 2 diabetes mellitus with hyperglycemia, without long-term current use of insulin (HCC)  -     MICROALB/CREAT RATIO, RANDOM URINE; Future  -     HEMOGLOBIN A1C; Future  -     OPHTHALMOLOGY - INTERNAL  -     lisinopril 5 MG Oral Tab;  Take 1 tablet (5 mg Can take to various Physical Therapy locations as is APPROVED by your insurance.               [address] # : [phone] for therapist to send me notes          Referral Priority:Routine          Referral Type:Rehab Services

## 2021-04-06 ENCOUNTER — LAB ENCOUNTER (OUTPATIENT)
Dept: LAB | Age: 61
End: 2021-04-06
Attending: FAMILY MEDICINE
Payer: COMMERCIAL

## 2021-04-06 DIAGNOSIS — Z00.00 ADULT GENERAL MEDICAL EXAM: ICD-10-CM

## 2021-04-06 DIAGNOSIS — E11.65 UNCONTROLLED TYPE 2 DIABETES MELLITUS WITH HYPERGLYCEMIA, WITHOUT LONG-TERM CURRENT USE OF INSULIN (HCC): ICD-10-CM

## 2021-04-06 PROCEDURE — 84439 ASSAY OF FREE THYROXINE: CPT

## 2021-04-06 PROCEDURE — 82570 ASSAY OF URINE CREATININE: CPT

## 2021-04-06 PROCEDURE — 84481 FREE ASSAY (FT-3): CPT | Performed by: FAMILY MEDICINE

## 2021-04-06 PROCEDURE — 3051F HG A1C>EQUAL 7.0%<8.0%: CPT | Performed by: FAMILY MEDICINE

## 2021-04-06 PROCEDURE — 80061 LIPID PANEL: CPT

## 2021-04-06 PROCEDURE — 83036 HEMOGLOBIN GLYCOSYLATED A1C: CPT

## 2021-04-06 PROCEDURE — 82043 UR ALBUMIN QUANTITATIVE: CPT

## 2021-04-06 PROCEDURE — 3061F NEG MICROALBUMINURIA REV: CPT | Performed by: FAMILY MEDICINE

## 2021-04-06 PROCEDURE — 84443 ASSAY THYROID STIM HORMONE: CPT

## 2021-04-06 PROCEDURE — 80053 COMPREHEN METABOLIC PANEL: CPT

## 2021-04-06 PROCEDURE — 85025 COMPLETE CBC W/AUTO DIFF WBC: CPT

## 2021-04-06 PROCEDURE — 36415 COLL VENOUS BLD VENIPUNCTURE: CPT

## 2021-04-19 NOTE — H&P
[address] [facility] [address] Gastroenterology                                                                                                  Clinic History and Physical     Pa (diabetes mellitus, type 2) ([facility]    • Essential hypertension    • High blood pressure    • High cholesterol    • Hyperlipidemia    • Migraines    • Obesity (BMI 30-39. 9)    • PONV (postoperative nausea and vomiting)       Past Surgical History:   Procedur pain  GASTROINTESTINAL:  see HPI  GENITOURINARY:  negative for dysuria or gross hematuria  INTEGUMENT/BREAST:  SKIN:  negative for jaundice   ALLERGIC/IMMUNOLOGIC:  negative for hay fever   ENDOCRINE:  negative for cold intolerance and heat intolerance  MU exception of a known baseline of constipation described below. No overt signs of bleeding. I reviewed colonoscopy procedure, questions/concerns were addressed, the patient is agreeable to proceeding.   In light of her history of constipation, I would not diagnosis), benefits, and alternatives to colonoscopy with the patient [who demonstrated understanding], including but not limited to the risks of bleeding, infection, pain, as well as the risks of anesthesia and perforation all leading to prolonged hospit

## 2021-05-03 ENCOUNTER — TELEPHONE (OUTPATIENT)
Dept: GASTROENTEROLOGY | Facility: CLINIC | Age: 61
End: 2021-05-03

## 2021-05-03 ENCOUNTER — OFFICE VISIT (OUTPATIENT)
Dept: GASTROENTEROLOGY | Facility: CLINIC | Age: 61
End: 2021-05-03
Payer: COMMERCIAL

## 2021-05-03 VITALS
SYSTOLIC BLOOD PRESSURE: 111 MMHG | HEIGHT: 59 IN | WEIGHT: 194.63 LBS | BODY MASS INDEX: 39.24 KG/M2 | DIASTOLIC BLOOD PRESSURE: 72 MMHG | HEART RATE: 88 BPM | TEMPERATURE: 99 F

## 2021-05-03 DIAGNOSIS — K59.00 CONSTIPATION, UNSPECIFIED CONSTIPATION TYPE: ICD-10-CM

## 2021-05-03 DIAGNOSIS — Z86.010 HISTORY OF COLON POLYPS: Primary | ICD-10-CM

## 2021-05-03 PROCEDURE — 3008F BODY MASS INDEX DOCD: CPT | Performed by: NURSE PRACTITIONER

## 2021-05-03 PROCEDURE — 3078F DIAST BP <80 MM HG: CPT | Performed by: NURSE PRACTITIONER

## 2021-05-03 PROCEDURE — 99203 OFFICE O/P NEW LOW 30 MIN: CPT | Performed by: NURSE PRACTITIONER

## 2021-05-03 PROCEDURE — 3074F SYST BP LT 130 MM HG: CPT | Performed by: NURSE PRACTITIONER

## 2021-05-03 RX ORDER — HYDROCHLOROTHIAZIDE 12.5 MG/1
12.5 TABLET ORAL DAILY
COMMUNITY
Start: 2021-04-08 | End: 2021-07-04

## 2021-05-03 RX ORDER — SODIUM, POTASSIUM,MAG SULFATES 17.5-3.13G
SOLUTION, RECONSTITUTED, ORAL ORAL
Qty: 1 BOTTLE | Refills: 0 | Status: SHIPPED | OUTPATIENT
Start: 2021-05-03 | End: 2021-07-16

## 2021-05-03 NOTE — TELEPHONE ENCOUNTER
Scheduled for:  Colonoscopy 98581  Provider Name:  Dr. Norman Bowman  Date:  8/24/21  Location:  [facility]  Sedation:  MAC  Time:  8:15am (pt is aware to arrive at 7:15am)  Prep:  Suprep  Meds/Allergies Reconciled?: Rosario/APN reviewed.     Diagnosis with codes:

## 2021-05-03 NOTE — PATIENT INSTRUCTIONS
-Schedule colonoscopy w/ Dr. Claudell Public with [facility] or [facility] or Dr. Brea Calhoun with [facility]  Dx: hx colon polyps   -Eligible for NE: No r/t history hypertension/diabetes  -Prep: Split dose Suprep or Colyte/Trilyte   -Anti-platelets and anti-coagulants: None  -

## 2021-05-05 ENCOUNTER — TELEPHONE (OUTPATIENT)
Dept: GASTROENTEROLOGY | Facility: CLINIC | Age: 61
End: 2021-05-05

## 2021-05-05 NOTE — TELEPHONE ENCOUNTER
Postponing encounter as patient is not scheduled for procedure until August & [facility]. Will follow up on this.

## 2021-05-27 NOTE — TELEPHONE ENCOUNTER
Sent message to patient inquiring if she would be okay with prep being sent to an alternate pharmacy due to national backorder on preps at all [facility] locations for unspecified amount of time.

## 2021-06-01 ENCOUNTER — OFFICE VISIT (OUTPATIENT)
Dept: FAMILY MEDICINE CLINIC | Facility: CLINIC | Age: 61
End: 2021-06-01
Payer: COMMERCIAL

## 2021-06-01 VITALS
TEMPERATURE: 98 F | SYSTOLIC BLOOD PRESSURE: 122 MMHG | BODY MASS INDEX: 38.51 KG/M2 | HEART RATE: 81 BPM | WEIGHT: 191 LBS | DIASTOLIC BLOOD PRESSURE: 81 MMHG | HEIGHT: 59 IN

## 2021-06-01 DIAGNOSIS — M79.18 PAIN IN RIGHT BUTTOCK: Primary | ICD-10-CM

## 2021-06-01 DIAGNOSIS — S73.191A: ICD-10-CM

## 2021-06-01 PROCEDURE — 3074F SYST BP LT 130 MM HG: CPT | Performed by: FAMILY MEDICINE

## 2021-06-01 PROCEDURE — 3079F DIAST BP 80-89 MM HG: CPT | Performed by: FAMILY MEDICINE

## 2021-06-01 PROCEDURE — 3008F BODY MASS INDEX DOCD: CPT | Performed by: FAMILY MEDICINE

## 2021-06-01 PROCEDURE — 99213 OFFICE O/P EST LOW 20 MIN: CPT | Performed by: FAMILY MEDICINE

## 2021-06-01 RX ORDER — LIDOCAINE 50 MG/G
1 PATCH TOPICAL EVERY 24 HOURS
Qty: 30 PATCH | Refills: 0 | Status: SHIPPED | OUTPATIENT
Start: 2021-06-01 | End: 2021-07-15

## 2021-06-01 RX ORDER — NAPROXEN 500 MG/1
500 TABLET ORAL 2 TIMES DAILY WITH MEALS
Qty: 60 TABLET | Refills: 1 | Status: SHIPPED | OUTPATIENT
Start: 2021-06-01 | End: 2021-07-31

## 2021-06-01 NOTE — PROGRESS NOTES
Patient ID: Leslie Peres is a 62year old female. HPI  Patient presents with:  Low Back Pain: Right side lower back pain near buttock    Last seen by me on 4/1/2021. Pt c/o sharp right upper buttock pain.  Pt's pain is worsened when twisting and she h and vomiting)        Past Surgical History:   Procedure Laterality Date   •      • DILATION/CURETTAGE,DIAGNOSTIC     • REDUCTION LEFT         • REDUCTION RIGHT                Current Outpatient Medications   Medication Sig Dispense Ref tenderness over the sacrum itself. No left-sided tenderness. No tenderness over the gluteus medius or iliotibial band. Back: SLR negative bilaterally. Fabers negative bilaterally. Hips: ER and IR normal bilaterally. Vitals reviewed.            Asses have reviewed the chart and discharge instructions (if applicable) and agree that the record reflects my personal performance and is accurate and complete.   Sweta Crespo DO, 6/1/2021, 12:46 PM

## 2021-06-25 ENCOUNTER — TELEPHONE (OUTPATIENT)
Dept: GASTROENTEROLOGY | Facility: CLINIC | Age: 61
End: 2021-06-25

## 2021-06-25 DIAGNOSIS — Z86.010 HISTORY OF COLON POLYPS: Primary | ICD-10-CM

## 2021-06-25 NOTE — TELEPHONE ENCOUNTER
Rescheduled for:  Colonoscopy 45656  Provider Name:  Dr. Mercedez Ferraro  Date:  8/24/21  Location:  [facility]  Sedation:  MAC  Time: From:8:15am  To:11:15am (pt is aware to arrive at 1015)    Prep:  Suprep  Meds/Allergies Reconciled?: Rosario/APN reviewed.     [facility]

## 2021-07-03 DIAGNOSIS — I10 ESSENTIAL (PRIMARY) HYPERTENSION: ICD-10-CM

## 2021-07-04 RX ORDER — HYDROCHLOROTHIAZIDE 12.5 MG/1
TABLET ORAL
Qty: 90 TABLET | Refills: 1 | Status: SHIPPED | OUTPATIENT
Start: 2021-07-04 | End: 2021-11-16

## 2021-07-12 NOTE — TELEPHONE ENCOUNTER
[id number], who states currently only have Suprep in stock. Will continue to monitor & check in on this closer to procedure date of 8/24.

## 2021-07-15 DIAGNOSIS — M79.18 PAIN IN RIGHT BUTTOCK: ICD-10-CM

## 2021-07-15 DIAGNOSIS — S73.191A: ICD-10-CM

## 2021-07-15 RX ORDER — LIDOCAINE 50 MG/G
1 PATCH TOPICAL DAILY
Qty: 30 PATCH | Refills: 0 | Status: SHIPPED | OUTPATIENT
Start: 2021-07-15 | End: 2021-10-12

## 2021-07-16 RX ORDER — POLYETHYLENE GLYCOL-3350 AND ELECTROLYTES WITH FLAVOR PACK 240; 5.84; 2.98; 6.72; 22.72 G/278.26G; G/278.26G; G/278.26G; G/278.26G; G/278.26G
4000 POWDER, FOR SOLUTION ORAL AS DIRECTED
Qty: 4000 ML | Refills: 0 | Status: ON HOLD | OUTPATIENT
Start: 2021-07-16 | End: 2021-08-24

## 2021-07-16 NOTE — TELEPHONE ENCOUNTER
Called patient's [facility] pharmacy and informed that they have Suprep and Gavilyte C in stock. E-script placed for prep (ok per OV notes to utilize PEG prep).

## 2021-08-19 RX ORDER — ATORVASTATIN CALCIUM 20 MG/1
20 TABLET, FILM COATED ORAL NIGHTLY
COMMUNITY
End: 2021-10-08

## 2021-08-21 ENCOUNTER — LAB ENCOUNTER (OUTPATIENT)
Dept: LAB | Age: 61
End: 2021-08-21
Attending: INTERNAL MEDICINE
Payer: COMMERCIAL

## 2021-08-21 DIAGNOSIS — Z01.818 PRE-OP TESTING: ICD-10-CM

## 2021-08-22 LAB — SARS-COV-2 RNA RESP QL NAA+PROBE: NOT DETECTED

## 2021-08-24 ENCOUNTER — HOSPITAL ENCOUNTER (OUTPATIENT)
Facility: HOSPITAL | Age: 61
Setting detail: HOSPITAL OUTPATIENT SURGERY
Discharge: HOME | End: 2021-08-24
Attending: INTERNAL MEDICINE | Admitting: INTERNAL MEDICINE
Payer: COMMERCIAL

## 2021-08-24 ENCOUNTER — ANESTHESIA EVENT (OUTPATIENT)
Dept: ENDOSCOPY | Facility: HOSPITAL | Age: 61
End: 2021-08-24
Payer: COMMERCIAL

## 2021-08-24 ENCOUNTER — ANESTHESIA (OUTPATIENT)
Dept: ENDOSCOPY | Facility: HOSPITAL | Age: 61
End: 2021-08-24
Payer: COMMERCIAL

## 2021-08-24 VITALS
DIASTOLIC BLOOD PRESSURE: 70 MMHG | WEIGHT: 189 LBS | SYSTOLIC BLOOD PRESSURE: 109 MMHG | RESPIRATION RATE: 23 BRPM | HEIGHT: 59 IN | HEART RATE: 62 BPM | TEMPERATURE: 97 F | BODY MASS INDEX: 38.1 KG/M2 | OXYGEN SATURATION: 95 %

## 2021-08-24 DIAGNOSIS — Z01.818 PRE-OP TESTING: Primary | ICD-10-CM

## 2021-08-24 DIAGNOSIS — K63.5 POLYP OF ASCENDING COLON: ICD-10-CM

## 2021-08-24 DIAGNOSIS — Z86.010 HISTORY OF COLON POLYPS: ICD-10-CM

## 2021-08-24 DIAGNOSIS — K63.5 SIGMOID POLYP: ICD-10-CM

## 2021-08-24 LAB — GLUCOSE BLDC GLUCOMTR-MCNC: 131 MG/DL (ref 70–99)

## 2021-08-24 PROCEDURE — 0DBK8ZX EXCISION OF ASCENDING COLON, VIA NATURAL OR ARTIFICIAL OPENING ENDOSCOPIC, DIAGNOSTIC: ICD-10-PCS | Performed by: INTERNAL MEDICINE

## 2021-08-24 PROCEDURE — 45385 COLONOSCOPY W/LESION REMOVAL: CPT | Performed by: INTERNAL MEDICINE

## 2021-08-24 PROCEDURE — 0DBN8ZX EXCISION OF SIGMOID COLON, VIA NATURAL OR ARTIFICIAL OPENING ENDOSCOPIC, DIAGNOSTIC: ICD-10-PCS | Performed by: INTERNAL MEDICINE

## 2021-08-24 RX ORDER — LIDOCAINE HYDROCHLORIDE 10 MG/ML
INJECTION, SOLUTION EPIDURAL; INFILTRATION; INTRACAUDAL; PERINEURAL AS NEEDED
Status: DISCONTINUED | OUTPATIENT
Start: 2021-08-24 | End: 2021-08-24 | Stop reason: SURG

## 2021-08-24 RX ORDER — NALOXONE HYDROCHLORIDE 0.4 MG/ML
80 INJECTION, SOLUTION INTRAMUSCULAR; INTRAVENOUS; SUBCUTANEOUS AS NEEDED
Status: DISCONTINUED | OUTPATIENT
Start: 2021-08-24 | End: 2021-08-24

## 2021-08-24 RX ORDER — SODIUM CHLORIDE, SODIUM LACTATE, POTASSIUM CHLORIDE, CALCIUM CHLORIDE 600; 310; 30; 20 MG/100ML; MG/100ML; MG/100ML; MG/100ML
INJECTION, SOLUTION INTRAVENOUS CONTINUOUS
Status: DISCONTINUED | OUTPATIENT
Start: 2021-08-24 | End: 2021-08-24

## 2021-08-24 RX ORDER — DEXTROSE MONOHYDRATE 25 G/50ML
50 INJECTION, SOLUTION INTRAVENOUS
Status: DISCONTINUED | OUTPATIENT
Start: 2021-08-24 | End: 2021-08-24

## 2021-08-24 RX ADMIN — LIDOCAINE HYDROCHLORIDE 50 MG: 10 INJECTION, SOLUTION EPIDURAL; INFILTRATION; INTRACAUDAL; PERINEURAL at 11:23:00

## 2021-08-24 RX ADMIN — SODIUM CHLORIDE, SODIUM LACTATE, POTASSIUM CHLORIDE, CALCIUM CHLORIDE: 600; 310; 30; 20 INJECTION, SOLUTION INTRAVENOUS at 11:57:00

## 2021-08-24 RX ADMIN — SODIUM CHLORIDE, SODIUM LACTATE, POTASSIUM CHLORIDE, CALCIUM CHLORIDE: 600; 310; 30; 20 INJECTION, SOLUTION INTRAVENOUS at 11:22:00

## 2021-08-24 NOTE — H&P
History & Physical Examination    Patient Name: Leah Borden  MRN: [id number]  CSN: [id number]  YOB: 1960    Diagnosis: Colorectal cancer screening      atorvastatin 20 MG Oral Tab, Take 20 mg by mouth nightly., Disp: , Rfl: , 8/23/2021  HYDRO 2000     Family History   Problem Relation Age of Onset   • Cancer Mother         Cancer - liver   • Other (Other) Father         alzheimers   • Diabetes Neg    • Glaucoma Neg      Social History    Tobacco Use      Smoking status: Never Smoker      Smo

## 2021-08-24 NOTE — ANESTHESIA POSTPROCEDURE EVALUATION
Patient: Lucía Wells    Procedure Summary     Date: 08/24/21 Room / Location: [address] [place] [address] ENDOSCOPY 04 / [address] [place] [address] ENDOSCOPY    Anesthesia Start: 6026 Anesthesia Stop:     Procedure: COLONOSCOPY (N/A ) Diagnosis:       History of colon polyps      (colon polyps)    Surg

## 2021-08-24 NOTE — ANESTHESIA PREPROCEDURE EVALUATION
Anesthesia PreOp Note    HPI:     Breana Charles is a 62year old female who presents for preoperative consultation requested by: Lang Nielson MD    Date of Surgery: 8/24/2021    Procedure(s):  COLONOSCOPY  Indication: History of colon polyps    Rele History:   Diagnosis Date   • Anesthesia complication    • Diabetes ([facility]    • DM2 (diabetes mellitus, type 2) ([facility]    • Essential hypertension    • High blood pressure    • High cholesterol    • Hyperlipidemia    • Migraines    • Obesity (BMI 30-39. 9) file      Number of children: Not on file      Years of education: Not on file      Highest education level: Not on file    Occupational History      Not on file    Tobacco Use      Smoking status: Never Smoker      Smokeless tobacco: Never Used    Vaping Available pre-op labs reviewed. Lab Results   Component Value Date    PGLU 131 (H) 08/24/2021          Vital Signs: Body mass index is 38.17 kg/m². height is 1.499 m (4' 11\") and weight is 85.7 kg (189 lb).  Her oral temperature is 98.5 °F (36.9

## 2021-08-24 NOTE — OPERATIVE REPORT
[facility] Endoscopy Report      Date of Procedure:  08/24/21      Preoperative Diagnosis:  1. Colorectal cancer screening  2.   Remote history of solitary subcentimeter tubular adenoma      Postoperative Diagnosis:  Colon polyps      Proce revealed no abnormalities. The procedure was well tolerated without immediate complication. Impression:  1. Colon polyps  2. Otherwise normal colonoscopy to the terminal ileum    Recommendations: Follow-up biopsy results.   Polyp histology to deter

## 2021-08-27 ENCOUNTER — TELEPHONE (OUTPATIENT)
Dept: GASTROENTEROLOGY | Facility: CLINIC | Age: 61
End: 2021-08-27

## 2021-08-27 NOTE — TELEPHONE ENCOUNTER
----- Message from Kim Bazan MD sent at 8/26/2021  6:53 PM CDT -----  I spoke to Sarai Zarco. She is feeling well. She had #3 subcentimeter adenomatous polyps removed. I have discussed the significance.   I have recommended a high-fiber diet for div

## 2021-08-27 NOTE — TELEPHONE ENCOUNTER
Recall colon in 3 years per Dr. Islas Alert. Last done:8/24/21  Next due: 8/24/24    Updated health maintenance and pt outreach.

## 2021-09-20 NOTE — Clinical Note
Cristiane Nunez am scribing for and in the presence of Dixie Augustin. Bronson BIRMINGHAM, MS, F.A.C.C. Patient: Fe Corbett  : 1944  Date of Visit: 2021    REASON FOR VISIT / CONSULTATION: Follow-up (Hx: ASHD, PAF. pt is here for 6 week f/u. Doing okay. Denied: CP, SOB, dizziness, lightheadd, palps)    History of Present Illness:        Dear Christiano Case, DO    I had the pleasure of seeing Fe Corbett in my office today. Ms. Jess Jaramillo is a 69 y.o. female with a history of recently diagnosied atherosclerotic heart disease including a NSTEMI in 2021 and atrial fibrillation. She came into the ER on 2021 due to chest pains and heart palpitations. She was then told she was having a heart attack at that time and was sent to [facility] in [place]. She had no previous heart history prior to this episode. She does have two sisters who have a history of atrial fibrillation. She did not know she was in atrial fibrillation at all when she came to the ER. Her chest discomfort started like a pressure feeling and then she felt like a heat sensation in her face and her pain did radiate into her back at that time as well. While at [facility] in [place] she did have a heart cath done and the picture is scanned into media. She did not need any stents at that time however her medications were changed of course at that time. The cardiologist in [place] did tell her the heart was strong. She did have an Echo done in [place] however she was never told about her heart strength. She has been on Bumex for many years due to leg swelling in the past. EF 55-70% on 2021 echo. Ms. Jess Jaramillo is here today for a follow up. She is doing well since her last visit. She did miss a step at her daughters house and she had a hematoma in her left leg. She was seen in the ED for this and had xrays done that did not show any fractures. She is doing well with cardiac rehab.  She denies any heart palpitations or lightheaded or Please review: Thank You. dizziness. She also denied any chest pain now or increased shortness of breath, abdominal pain, bleeding problems, problems with her medications or any other concerns at this time. Bleeding Risks: Ms. Mary Sanchez denies any current or recent bleeding problems including a history of a GI bleed, ulcers, recent or upcoming surgeries, blood in her stool or black tarry stools or blood in her urine. Exercise Tolerance: Ms. Mary Sanchez reports that she has a fairly good exercise tolerance. Her says that she could walk 1/2 a mile without developing chest discomfort or significant shortness of breath. Her legs would get to tired if she went any further. PAST MEDICAL HISTORY:         Past Medical History:   Diagnosis Date    CAD (coronary artery disease)     Hyperlipidemia     Hypertension        CURRENT ALLERGIES: Patient has no known allergies. REVIEW OF SYSTEMS: 14 systems were reviewed. Pertinent positives and negatives as above, all else negative.      Past Surgical History:   Procedure Laterality Date    HYSTERECTOMY      JOINT REPLACEMENT Bilateral     Social History:  Social History     Tobacco Use    Smoking status: Never Smoker    Smokeless tobacco: Never Used   Substance Use Topics    Alcohol use: Not Currently    Drug use: Never        CURRENT MEDICATIONS:        Outpatient Medications Marked as Taking for the 9/20/21 encounter (Office Visit) with Mare Ruiz MD   Medication Sig Dispense Refill    aspirin 81 MG EC tablet Take 81 mg by mouth daily      calcium carbonate (OSCAL) 500 MG TABS tablet Take 600 mg by mouth 2 times daily       Multiple Vitamins-Minerals (CENTRUM SILVER 50+WOMEN PO) Take by mouth      docusate sodium (COLACE) 100 MG capsule Take 100 mg by mouth 2 times daily      rosuvastatin (CRESTOR) 20 MG tablet Take 1 tablet by mouth daily 90 tablet 3    metoprolol succinate (TOPROL XL) 50 MG extended release tablet Take 1 tablet by mouth daily 90 tablet 3    XARELTO 20 MG TABS tablet Take 20 mg by mouth daily (with breakfast)       sotalol (BETAPACE) 80 MG tablet Take 80 mg by mouth 2 times daily       losartan (COZAAR) 50 MG tablet Take 50 mg by mouth daily      bumetanide (BUMEX) 1 MG tablet Take 1.5 mg by mouth daily      omeprazole (PRILOSEC) 20 MG delayed release capsule Take 40 mg by mouth daily         FAMILY HISTORY: family history includes Atrial Fibrillation in her sister and sister; Cancer in her sister; Diabetes in her brother; Heart Attack in her father; Heart Disease in her brother. Physical Examination:     /78 (Site: Left Upper Arm, Position: Sitting, Cuff Size: Large Adult)   Pulse 62   Resp 18   Ht 5' (1.524 m)   Wt 198 lb (89.8 kg)   SpO2 96%   BMI 38.67 kg/m²  Body mass index is 38.67 kg/m². Constitutional: She appeared oriented to person and place. She appears well-developed and well-nourished. In no acute distress. HEENT: Normocephalic and atraumatic. No JVD present. Carotid bruit is not present. No mass and no thyromegaly present. No lymphadenopathy noted. Cardiovascular: Normal rate, regular rhythm, normal heart sounds. Exam reveals no gallop and no friction rubs. 2/6 systolic murmur, 2nd intercostal space on the RIGHT just lateral to the sternum. Pulmonary/Chest: Effort normal and breath sounds normal. No respiratory distress. She has no wheezes, rhonchi or rales. Abdominal: Soft, non-tender. She exhibits no organomegaly, mass or bruit. Extremities: None. No cyanosis or clubbing. 2+ radial and carotid pulses. Distal extremity pulses: 2+ bilaterally. Neurological: Alertness and orientation as per Constitutional exam. No evidence of gross cranial nerve deficit. Coordination appeared normal.   Skin: Skin is warm and dry. There is no rash or diaphoresis. Psychiatric: She has a normal mood and affect.  Her speech is normal and behavior is normal.      MOST RECENT LABS ON RECORD:   Lab Results   Component Value Date    WBC 8.6 07/13/2021    HGB 15.3 (H) 07/13/2021    HCT 45.9 07/13/2021     07/13/2021    ALT 42 (H) 07/13/2021    AST 29 07/13/2021     07/13/2021    K 3.8 07/13/2021     07/13/2021    CREATININE 0.64 07/13/2021    BUN 15 07/13/2021    CO2 23 07/13/2021    TSH 2.21 07/13/2021    INR 1.0 07/13/2021       ASSESSMENT:     1. Coronary artery disease involving native coronary artery of native heart without angina pectoris    2. Non-ST elevation myocardial infarction (NSTEMI) (HCC)    3. Paroxysmal atrial fibrillation ([address]    4. Essential hypertension    5. Mixed hyperlipidemia    6. Encounter for current long-term use of anticoagulants       PLAN:         Atherosclerotic Heart Disease: Recent NSTEMI as outlined above. Also S/P Heart Cath done on 7/14/2021 and no stents were needed at that time. Currently Stable at this time.  Antiplatelet Agent: Continue Aspirin 81 mg daily.  Beta Blocker: Continue Metoprolol succinate (Toprol XL) 50 mg daily.  Cholesterol Reduction Therapy: Continue rosuvastatin (Crestor) 20 mg daily.  Additional counseling: I advised them to call our office or go to the emergency room if they developed worsening or persistent chest pain or increased shortness of breath as this could be life threatening. · Paroxysmal Atrial Fibrillation: Rhythm Control Asymptomatic. We did discuss in great detail the etiology of her atrial fibrillation and her new diagnosis of atrial fibrillation.  Beta Blocker: Continue Metoprolol succinate (Toprol XL) 50 mg daily.  Anti-Arrhythmic: sotalol (Betapace) 80 mg every 12 hours.: Monitoring: Since he will being maintained on dronedarone, I told them that we will need to closely monitor them for potential side effects. These include re-assessment of their ECG, LFTs and renal function.   [id number] Score for Atrial Fibrillation Stroke Risk   Risk   Factors  Component Value   C CHF No 0   H HTN Yes 1   A2 Age >= 76 Yes,  (78 y.o.) 2   D DM No 0   S2 Prior Stroke/TIA No 0   V Vascular Disease Yes 1   A Age 74-69 No,  (78 y.o.) 0   Sc Sex female 1    [id number]  Score  5   Score last updated 6/6/24 96:82 AM EDT  Click here for a link to the UpToDate guideline \"Atrial Fibrillation: Anticoagulation therapy to prevent embolization  Disclaimer: Risk Score calculation is dependent on accuracy of patient problem list and past encounter diagnosis.  Stroke Risk: CHADS2-VASc Score: 4/9 (4% stroke risk)   Anticoagulation: Continue Riveroxaban (Xarelto): 20 mg once daily with largest meal (typically dinner).  Possible a Weak Heart: EF 55-60% on echo done on 7/14/2021   Beta Blocker: Continue Metoprolol succinate (Toprol XL) 50 mg daily.  ACE Inibitor/ARB: Continue losartan (Cozaar) 50 mg daily.  Diuretics: Continue bumetinide (Bumex) 1.5 mg every morning. I also discussed the potential side effects of this medication including lightheadedness and dizziness and instructed them to stop the medication of this occurs and call our office if this occurs. Finally, I recommended that she continue her current medications and follow up with you as previously scheduled. FOLLOW UP:   I told Ms. Mary Sanchez to call my office if she had any problems, but otherwise I asked her to Return in about 6 months (around 3/20/2022). However, I would be happy to see her sooner should the need arise. Sincerely,  Gertrudis Harding. Bronson BIRMINGHAM, MS, F.A.C.C. [facility]    [address] [place] [address], [address] [place] [address]  Phone: [phone], Fax: [phone]     I believe that the risk of significant morbidity and mortality related to the patient's current medical conditions are: low-intermediate. The documentation recorded by the scribe, accurately and completely reflects the services I personally performed and the decisions made by me. Mare Ruiz MD, MS, F.A.C.C.  September 20, 2021

## 2021-10-08 DIAGNOSIS — E78.2 MIXED HYPERLIPIDEMIA: ICD-10-CM

## 2021-10-08 RX ORDER — ATORVASTATIN CALCIUM 20 MG/1
TABLET, FILM COATED ORAL
Qty: 90 TABLET | Refills: 1 | Status: SHIPPED | OUTPATIENT
Start: 2021-10-08 | End: 2021-11-16

## 2021-10-08 NOTE — TELEPHONE ENCOUNTER
Reviewed chart,med listed as pt reported in current med history. In past med history, med last prescribed by Dr. Emanuel Duval on 4/1/221. Also noted doctor's comment below from 4/1/21 office visit. Med refilled per protocol.     Mixed hyperlipidemia  -     loree

## 2021-10-12 ENCOUNTER — OFFICE VISIT (OUTPATIENT)
Dept: FAMILY MEDICINE CLINIC | Facility: CLINIC | Age: 61
End: 2021-10-12
Payer: COMMERCIAL

## 2021-10-12 VITALS
DIASTOLIC BLOOD PRESSURE: 96 MMHG | BODY MASS INDEX: 38.58 KG/M2 | SYSTOLIC BLOOD PRESSURE: 136 MMHG | HEIGHT: 59 IN | TEMPERATURE: 97 F | WEIGHT: 191.38 LBS | HEART RATE: 82 BPM

## 2021-10-12 DIAGNOSIS — R30.0 DYSURIA: Primary | ICD-10-CM

## 2021-10-12 DIAGNOSIS — N30.90 CYSTITIS: ICD-10-CM

## 2021-10-12 DIAGNOSIS — I10 ESSENTIAL HYPERTENSION: ICD-10-CM

## 2021-10-12 DIAGNOSIS — E11.65 UNCONTROLLED TYPE 2 DIABETES MELLITUS WITH HYPERGLYCEMIA, WITHOUT LONG-TERM CURRENT USE OF INSULIN (HCC): ICD-10-CM

## 2021-10-12 DIAGNOSIS — R09.82 POST-NASAL DRIP: ICD-10-CM

## 2021-10-12 DIAGNOSIS — R05.9 COUGH: ICD-10-CM

## 2021-10-12 PROCEDURE — 99214 OFFICE O/P EST MOD 30 MIN: CPT | Performed by: FAMILY MEDICINE

## 2021-10-12 PROCEDURE — 3080F DIAST BP >= 90 MM HG: CPT | Performed by: FAMILY MEDICINE

## 2021-10-12 PROCEDURE — 3075F SYST BP GE 130 - 139MM HG: CPT | Performed by: FAMILY MEDICINE

## 2021-10-12 PROCEDURE — 81003 URINALYSIS AUTO W/O SCOPE: CPT | Performed by: FAMILY MEDICINE

## 2021-10-12 PROCEDURE — 3008F BODY MASS INDEX DOCD: CPT | Performed by: FAMILY MEDICINE

## 2021-10-12 RX ORDER — LISINOPRIL 5 MG/1
5 TABLET ORAL DAILY
Qty: 90 TABLET | Refills: 1 | Status: SHIPPED | OUTPATIENT
Start: 2021-10-12 | End: 2021-11-16

## 2021-10-12 RX ORDER — BENZONATATE 200 MG/1
200 CAPSULE ORAL 3 TIMES DAILY PRN
Qty: 30 CAPSULE | Refills: 0 | Status: SHIPPED | OUTPATIENT
Start: 2021-10-12 | End: 2021-10-22

## 2021-10-12 RX ORDER — LEVOFLOXACIN 500 MG/1
500 TABLET, FILM COATED ORAL DAILY
Qty: 5 TABLET | Refills: 0 | Status: SHIPPED | OUTPATIENT
Start: 2021-10-12 | End: 2021-10-17

## 2021-10-12 RX ORDER — LEVOCETIRIZINE DIHYDROCHLORIDE 5 MG/1
5 TABLET, FILM COATED ORAL EVERY MORNING
Qty: 90 TABLET | Refills: 0 | Status: SHIPPED | OUTPATIENT
Start: 2021-10-12 | End: 2021-11-16

## 2021-10-12 NOTE — TELEPHONE ENCOUNTER
Please review. Protocol failed / No protocol.     Requested Prescriptions   Pending Prescriptions Disp Refills    metFORMIN 500 MG Oral Tab 90 tablet 1     Sig: Take 1 tablet (500 mg total) by mouth daily with breakfast.        Diabetes Medication Protocol

## 2021-10-12 NOTE — TELEPHONE ENCOUNTER
metFORMIN HCl 500 MG Oral Tab, Take 500 mg by mouth daily with breakfast., Disp: , Rfl:   lisinopril 5 MG Oral Tab, Take 1 tablet (5 mg total) by mouth daily.  For high blood pressure or kidney protection, Disp: 90 tablet, Rfl: 1

## 2021-10-12 NOTE — TELEPHONE ENCOUNTER
Refill passed per [place] [employer] [facility] [place], [facility] protocol.     Requested Prescriptions   Pending Prescriptions Disp Refills    metFORMIN 500 MG Oral Tab 90 tablet 1     Sig: Take 1 tablet (500 mg total) by mouth daily with breakfast.        Diabetes Medication Protocol Fa

## 2021-10-12 NOTE — PROGRESS NOTES
Patient ID: Angel Eagle is a 62year old female. HPI  Patient presents with:  Painful Urination: x 3 days    Last seen by me on 6/1/2021. Pt presents today with her daughter. Pt c/o burning pain with urination x10/10/2021.  She has increased urin hypertension    • High blood pressure    • High cholesterol    • Hyperlipidemia    • Migraines    • Obesity (BMI 30-39. 9)    • PONV (postoperative nausea and vomiting)        Past Surgical History:   Procedure Laterality Date   •      • COLONO or rhinorrhea. Throat: Oropharynx is clear without exudate   Eyes: Conjunctivae and EOM are normal.   Neck: Normal range of motion. No thyromegaly present. Cardiovascular: Normal rate, regular rhythm and normal heart sounds.    Pulmonary/Chest: Effort n visit. Pankaj Marroquin    10/12/2021    By signing my name below, Hector Berg,  attest that this documentation has been prepared under the direction and in the presence of Papa Fox DO.    Electronically Signed: Pankaj Marroquin, 10/12/2

## 2021-11-16 ENCOUNTER — TELEPHONE (OUTPATIENT)
Dept: FAMILY MEDICINE CLINIC | Facility: CLINIC | Age: 61
End: 2021-11-16

## 2021-11-16 DIAGNOSIS — I10 ESSENTIAL HYPERTENSION: ICD-10-CM

## 2021-11-16 DIAGNOSIS — R05.9 COUGH: ICD-10-CM

## 2021-11-16 DIAGNOSIS — E78.2 MIXED HYPERLIPIDEMIA: ICD-10-CM

## 2021-11-16 DIAGNOSIS — R09.82 POST-NASAL DRIP: ICD-10-CM

## 2021-11-16 DIAGNOSIS — I10 ESSENTIAL (PRIMARY) HYPERTENSION: ICD-10-CM

## 2021-11-16 DIAGNOSIS — E11.65 UNCONTROLLED TYPE 2 DIABETES MELLITUS WITH HYPERGLYCEMIA, WITHOUT LONG-TERM CURRENT USE OF INSULIN (HCC): ICD-10-CM

## 2021-11-16 RX ORDER — ATORVASTATIN CALCIUM 20 MG/1
20 TABLET, FILM COATED ORAL NIGHTLY
Qty: 90 TABLET | Refills: 1 | Status: SHIPPED | OUTPATIENT
Start: 2021-11-16

## 2021-11-16 RX ORDER — HYDROCHLOROTHIAZIDE 12.5 MG/1
12.5 TABLET ORAL DAILY
Qty: 90 TABLET | Refills: 0 | Status: SHIPPED | OUTPATIENT
Start: 2021-11-16 | End: 2022-01-02

## 2021-11-16 RX ORDER — LISINOPRIL 5 MG/1
5 TABLET ORAL DAILY
Qty: 90 TABLET | Refills: 1 | Status: SHIPPED | OUTPATIENT
Start: 2021-11-16

## 2021-11-16 RX ORDER — LEVOCETIRIZINE DIHYDROCHLORIDE 5 MG/1
5 TABLET, FILM COATED ORAL EVERY MORNING
Qty: 90 TABLET | Refills: 0 | Status: SHIPPED | OUTPATIENT
Start: 2021-11-16 | End: 2022-01-07

## 2021-11-16 NOTE — TELEPHONE ENCOUNTER
Reported that she has new insurance and pharmacy covers [facility] [address] now and not [facility], requesting to transfer all her medications, preferred pharmacy updated, warm transferred to [facility]  for insurance updates.

## 2022-01-02 DIAGNOSIS — I10 ESSENTIAL (PRIMARY) HYPERTENSION: ICD-10-CM

## 2022-01-02 RX ORDER — HYDROCHLOROTHIAZIDE 12.5 MG/1
TABLET ORAL
Qty: 90 TABLET | Refills: 1 | Status: SHIPPED | OUTPATIENT
Start: 2022-01-02

## 2022-01-07 DIAGNOSIS — E11.65 UNCONTROLLED TYPE 2 DIABETES MELLITUS WITH HYPERGLYCEMIA, WITHOUT LONG-TERM CURRENT USE OF INSULIN (HCC): ICD-10-CM

## 2022-01-07 DIAGNOSIS — R05.9 COUGH: ICD-10-CM

## 2022-01-07 DIAGNOSIS — R09.82 POST-NASAL DRIP: ICD-10-CM

## 2022-01-07 DIAGNOSIS — E66.01 MORBID OBESITY DUE TO EXCESS CALORIES (HCC): ICD-10-CM

## 2022-01-07 RX ORDER — LEVOCETIRIZINE DIHYDROCHLORIDE 5 MG/1
5 TABLET, FILM COATED ORAL EVERY MORNING
Qty: 90 TABLET | Refills: 1 | Status: SHIPPED | OUTPATIENT
Start: 2022-01-07

## 2022-01-07 NOTE — TELEPHONE ENCOUNTER
Refill passed per [address] [place] protocol.     Requested Prescriptions   Pending Prescriptions Disp Refills    LEVOCETIRIZINE 5 MG Oral Tab [Pharmacy Med Name: LEVOCETIRIZINE 5 MG TABLET] 90 tablet 0     Sig: TAKE 1 TABLET BY MOUTH EVERY DAY IN THE MORNING

## 2022-01-13 ENCOUNTER — TELEPHONE (OUTPATIENT)
Dept: FAMILY MEDICINE CLINIC | Facility: CLINIC | Age: 62
End: 2022-01-13

## 2022-01-13 NOTE — TELEPHONE ENCOUNTER
Patient called stating she just picked up a refill for metformin and it was for 500 mg. She states she has always taken metformin 1000 mg once a day.  Please advise

## 2022-01-13 NOTE — TELEPHONE ENCOUNTER
Our computer shows that we have been sending in the 500 mg dose of metformin since June of this year. Is she sure she wants us to send in 1000 mg dose once daily?

## 2022-01-14 NOTE — TELEPHONE ENCOUNTER
2nd attempt to reach patient. Has not read [employer] I sent. Patient was left a message to call back.  Transfer to triage dept [id number]

## 2022-01-20 NOTE — TELEPHONE ENCOUNTER
Spoke with pt,  verified  Pt stated she been taking metformin 1000 mg every day for a long time.    Per our record 17, pt started on metformin 1000 BID, but its too much to take so she was advised to take once a day of 1000 mg and our record showed

## 2022-01-24 ENCOUNTER — TELEPHONE (OUTPATIENT)
Dept: FAMILY MEDICINE CLINIC | Facility: CLINIC | Age: 62
End: 2022-01-24

## 2022-01-24 DIAGNOSIS — E66.01 MORBID OBESITY DUE TO EXCESS CALORIES (HCC): ICD-10-CM

## 2022-01-24 DIAGNOSIS — E11.65 UNCONTROLLED TYPE 2 DIABETES MELLITUS WITH HYPERGLYCEMIA, WITHOUT LONG-TERM CURRENT USE OF INSULIN (HCC): ICD-10-CM

## 2022-01-24 NOTE — TELEPHONE ENCOUNTER
Patient calling  and states that she went to  [facility] but they do not have her prescription, states that she is taking metformin 1000 mg once a day and she is not using  [facility] anymore.   Re sent prescription to [facility] and with receipt confirmatio

## 2022-01-28 ENCOUNTER — HOSPITAL ENCOUNTER (OUTPATIENT)
Dept: CT IMAGING | Facility: HOSPITAL | Age: 62
Discharge: HOME | End: 2022-01-28
Attending: OBSTETRICS & GYNECOLOGY
Payer: COMMERCIAL

## 2022-01-28 DIAGNOSIS — R10.2 PELVIC AND PERINEAL PAIN: ICD-10-CM

## 2022-01-28 PROCEDURE — 74176 CT ABD & PELVIS W/O CONTRAST: CPT | Performed by: OBSTETRICS & GYNECOLOGY

## 2022-04-04 DIAGNOSIS — I10 ESSENTIAL (PRIMARY) HYPERTENSION: ICD-10-CM

## 2022-04-04 RX ORDER — HYDROCHLOROTHIAZIDE 12.5 MG/1
12.5 TABLET ORAL DAILY
Qty: 90 TABLET | Refills: 1 | Status: SHIPPED | OUTPATIENT
Start: 2022-04-04 | End: 2022-08-27

## 2022-04-04 NOTE — TELEPHONE ENCOUNTER
Refill passed per [facility] protocol. Requested Prescriptions   Pending Prescriptions Disp Refills    HYDROCHLOROTHIAZIDE 12.5 MG Oral Tab [Pharmacy Med Name: hydroCHLOROthiazide 12.5 MG TABLET] 30 tablet 0     Sig: TAKE 1 TABLET (12.5 MG TOTAL) BY MOUTH DAILY. Hypertensive Medications Protocol Passed - 4/4/2022  5:04 AM        Passed - CMP or BMP in past 12 months        Passed - Appointment in past 6 or next 3 months        Passed - GFR Non- > 50     Lab Results   Component Value Date    GFRNAA 82 04/06/2021                       Recent Outpatient Visits              5 months ago [address], [facility], [address], [place], [place]    Office Visit    10 months ago Pain in right buttock    [address], [address], [place], [place]    Office Visit    11 months ago History of colon polyps    [facility], [facility], [place]    Office Visit    1 year ago Adult general medical exam    [address], [address], [place], [place]    Office Visit    1 year ago Controlled type 2 diabetes mellitus without complication, without long-term current use of insulin [facility]    [facility], [address], [address], [place], [place]    Office Visit            Future Appointments         Provider Department Appt Notes    In 1 week Ra Pham, [address] [facility], [facility], [place] I've been feeling a little discomfort and would like to see the doctor.

## 2022-05-15 DIAGNOSIS — E11.65 UNCONTROLLED TYPE 2 DIABETES MELLITUS WITH HYPERGLYCEMIA, WITHOUT LONG-TERM CURRENT USE OF INSULIN (HCC): ICD-10-CM

## 2022-05-15 DIAGNOSIS — I10 ESSENTIAL HYPERTENSION: ICD-10-CM

## 2022-05-17 RX ORDER — LISINOPRIL 5 MG/1
5 TABLET ORAL DAILY
Qty: 30 TABLET | Refills: 0 | Status: SHIPPED | OUTPATIENT
Start: 2022-05-17 | End: 2022-06-15

## 2022-06-15 DIAGNOSIS — E11.65 UNCONTROLLED TYPE 2 DIABETES MELLITUS WITH HYPERGLYCEMIA, WITHOUT LONG-TERM CURRENT USE OF INSULIN (HCC): ICD-10-CM

## 2022-06-15 DIAGNOSIS — I10 ESSENTIAL HYPERTENSION: ICD-10-CM

## 2022-06-16 RX ORDER — LISINOPRIL 5 MG/1
5 TABLET ORAL DAILY
Qty: 90 TABLET | Refills: 0 | Status: SHIPPED | OUTPATIENT
Start: 2022-06-16 | End: 2022-08-27

## 2022-06-16 NOTE — TELEPHONE ENCOUNTER
Refilled times 1 but needs appointment before the next prescription will be filled. Please call patient and set up an office visit as overdue for diabetic exam..

## 2022-07-16 DIAGNOSIS — E78.2 MIXED HYPERLIPIDEMIA: ICD-10-CM

## 2022-07-18 RX ORDER — ATORVASTATIN CALCIUM 20 MG/1
TABLET, FILM COATED ORAL
Qty: 30 TABLET | Refills: 0 | Status: SHIPPED | OUTPATIENT
Start: 2022-07-18

## 2022-07-27 DIAGNOSIS — E11.65 UNCONTROLLED TYPE 2 DIABETES MELLITUS WITH HYPERGLYCEMIA, WITHOUT LONG-TERM CURRENT USE OF INSULIN (HCC): ICD-10-CM

## 2022-07-27 DIAGNOSIS — E66.01 MORBID OBESITY DUE TO EXCESS CALORIES (HCC): ICD-10-CM

## 2022-08-04 ENCOUNTER — OFFICE VISIT (OUTPATIENT)
Dept: FAMILY MEDICINE CLINIC | Facility: CLINIC | Age: 62
End: 2022-08-04
Payer: COMMERCIAL

## 2022-08-04 VITALS
TEMPERATURE: 98 F | DIASTOLIC BLOOD PRESSURE: 82 MMHG | HEIGHT: 59 IN | BODY MASS INDEX: 38.71 KG/M2 | HEART RATE: 92 BPM | WEIGHT: 192 LBS | SYSTOLIC BLOOD PRESSURE: 115 MMHG

## 2022-08-04 DIAGNOSIS — E11.65 UNCONTROLLED TYPE 2 DIABETES MELLITUS WITH HYPERGLYCEMIA, WITHOUT LONG-TERM CURRENT USE OF INSULIN (HCC): Primary | ICD-10-CM

## 2022-08-04 DIAGNOSIS — Z23 NEED FOR VACCINATION: ICD-10-CM

## 2022-08-04 DIAGNOSIS — E78.2 MIXED HYPERLIPIDEMIA: ICD-10-CM

## 2022-08-04 DIAGNOSIS — F41.9 ANXIETY: ICD-10-CM

## 2022-08-04 DIAGNOSIS — I10 ESSENTIAL HYPERTENSION: ICD-10-CM

## 2022-08-04 PROCEDURE — 3074F SYST BP LT 130 MM HG: CPT | Performed by: FAMILY MEDICINE

## 2022-08-04 PROCEDURE — 90750 HZV VACC RECOMBINANT IM: CPT | Performed by: FAMILY MEDICINE

## 2022-08-04 PROCEDURE — 90471 IMMUNIZATION ADMIN: CPT | Performed by: FAMILY MEDICINE

## 2022-08-04 PROCEDURE — 99214 OFFICE O/P EST MOD 30 MIN: CPT | Performed by: FAMILY MEDICINE

## 2022-08-04 PROCEDURE — 3079F DIAST BP 80-89 MM HG: CPT | Performed by: FAMILY MEDICINE

## 2022-08-04 PROCEDURE — 3008F BODY MASS INDEX DOCD: CPT | Performed by: FAMILY MEDICINE

## 2022-08-16 NOTE — TELEPHONE ENCOUNTER
Action Requested: Summary for Provider     []  Critical Lab, Recommendations Needed  [] Need Additional Advice  []   FYI    []   Need Orders  [] Need Medications Sent to Pharmacy  []  Other     SUMMARY: patient called back, reported that she has long sta
LMTCB
Pt scheduled appt through [facility] with following sx:    Visit Type: [facility] EXAM (2964)      9/10/2019   10:30 AM  30 mins.  Ralph Monterroso DO [facility]      Patient Comments:   Hand numbness     Please advise.
37.1

## 2022-08-18 DIAGNOSIS — E78.2 MIXED HYPERLIPIDEMIA: ICD-10-CM

## 2022-08-18 RX ORDER — ATORVASTATIN CALCIUM 20 MG/1
TABLET, FILM COATED ORAL
Qty: 30 TABLET | Refills: 0 | Status: SHIPPED | OUTPATIENT
Start: 2022-08-18

## 2022-08-19 ENCOUNTER — LAB ENCOUNTER (OUTPATIENT)
Dept: LAB | Age: 62
End: 2022-08-19
Attending: FAMILY MEDICINE
Payer: COMMERCIAL

## 2022-08-19 DIAGNOSIS — E11.65 UNCONTROLLED TYPE 2 DIABETES MELLITUS WITH HYPERGLYCEMIA, WITHOUT LONG-TERM CURRENT USE OF INSULIN (HCC): ICD-10-CM

## 2022-08-19 DIAGNOSIS — E78.2 MIXED HYPERLIPIDEMIA: ICD-10-CM

## 2022-08-19 DIAGNOSIS — I10 ESSENTIAL HYPERTENSION: ICD-10-CM

## 2022-08-19 LAB
ALBUMIN SERPL-MCNC: 3.4 G/DL (ref 3.4–5)
ALBUMIN/GLOB SERPL: 0.9 {RATIO} (ref 1–2)
ALP LIVER SERPL-CCNC: 84 U/L
ALT SERPL-CCNC: 36 U/L
ANION GAP SERPL CALC-SCNC: 6 MMOL/L (ref 0–18)
AST SERPL-CCNC: 16 U/L (ref 15–37)
BASOPHILS # BLD AUTO: 0.06 X10(3) UL (ref 0–0.2)
BASOPHILS NFR BLD AUTO: 0.6 %
BILIRUB SERPL-MCNC: 0.8 MG/DL (ref 0.1–2)
BUN BLD-MCNC: 13 MG/DL (ref 7–18)
BUN/CREAT SERPL: 14.8 (ref 10–20)
CALCIUM BLD-MCNC: 9.4 MG/DL (ref 8.5–10.1)
CHLORIDE SERPL-SCNC: 106 MMOL/L (ref 98–112)
CHOLEST SERPL-MCNC: 118 MG/DL (ref ?–200)
CO2 SERPL-SCNC: 28 MMOL/L (ref 21–32)
CREAT BLD-MCNC: 0.88 MG/DL
CREAT UR-SCNC: 207 MG/DL
DEPRECATED RDW RBC AUTO: 42.3 FL (ref 35.1–46.3)
EOSINOPHIL # BLD AUTO: 0.38 X10(3) UL (ref 0–0.7)
EOSINOPHIL NFR BLD AUTO: 4 %
ERYTHROCYTE [DISTWIDTH] IN BLOOD BY AUTOMATED COUNT: 13 % (ref 11–15)
EST. AVERAGE GLUCOSE BLD GHB EST-MCNC: 229 MG/DL (ref 68–126)
FASTING PATIENT LIPID ANSWER: YES
FASTING STATUS PATIENT QL REPORTED: YES
GFR SERPLBLD BASED ON 1.73 SQ M-ARVRAT: 75 ML/MIN/1.73M2 (ref 60–?)
GLOBULIN PLAS-MCNC: 4 G/DL (ref 2.8–4.4)
GLUCOSE BLD-MCNC: 217 MG/DL (ref 70–99)
HBA1C MFR BLD: 9.6 % (ref ?–5.7)
HCT VFR BLD AUTO: 42.5 %
HDLC SERPL-MCNC: 43 MG/DL (ref 40–59)
HGB BLD-MCNC: 14 G/DL
IMM GRANULOCYTES # BLD AUTO: 0.02 X10(3) UL (ref 0–1)
IMM GRANULOCYTES NFR BLD: 0.2 %
LDLC SERPL CALC-MCNC: 52 MG/DL (ref ?–100)
LYMPHOCYTES # BLD AUTO: 2.11 X10(3) UL (ref 1–4)
LYMPHOCYTES NFR BLD AUTO: 22.4 %
MCH RBC QN AUTO: 29.2 PG (ref 26–34)
MCHC RBC AUTO-ENTMCNC: 32.9 G/DL (ref 31–37)
MCV RBC AUTO: 88.5 FL
MICROALBUMIN UR-MCNC: 1.33 MG/DL
MICROALBUMIN/CREAT 24H UR-RTO: 6.4 UG/MG (ref ?–30)
MONOCYTES # BLD AUTO: 0.66 X10(3) UL (ref 0.1–1)
MONOCYTES NFR BLD AUTO: 7 %
NEUTROPHILS # BLD AUTO: 6.21 X10 (3) UL (ref 1.5–7.7)
NEUTROPHILS # BLD AUTO: 6.21 X10(3) UL (ref 1.5–7.7)
NEUTROPHILS NFR BLD AUTO: 65.8 %
NONHDLC SERPL-MCNC: 75 MG/DL (ref ?–130)
OSMOLALITY SERPL CALC.SUM OF ELEC: 297 MOSM/KG (ref 275–295)
PLATELET # BLD AUTO: 286 10(3)UL (ref 150–450)
POTASSIUM SERPL-SCNC: 4.6 MMOL/L (ref 3.5–5.1)
PROT SERPL-MCNC: 7.4 G/DL (ref 6.4–8.2)
RBC # BLD AUTO: 4.8 X10(6)UL
SODIUM SERPL-SCNC: 140 MMOL/L (ref 136–145)
T4 FREE SERPL-MCNC: 1.8 NG/DL (ref 0.8–1.7)
TRIGL SERPL-MCNC: 128 MG/DL (ref 30–149)
TSI SER-ACNC: 4.14 MIU/ML (ref 0.36–3.74)
VLDLC SERPL CALC-MCNC: 18 MG/DL (ref 0–30)
WBC # BLD AUTO: 9.4 X10(3) UL (ref 4–11)

## 2022-08-19 PROCEDURE — 36415 COLL VENOUS BLD VENIPUNCTURE: CPT

## 2022-08-19 PROCEDURE — 85025 COMPLETE CBC W/AUTO DIFF WBC: CPT

## 2022-08-19 PROCEDURE — 3061F NEG MICROALBUMINURIA REV: CPT | Performed by: FAMILY MEDICINE

## 2022-08-19 PROCEDURE — 84439 ASSAY OF FREE THYROXINE: CPT

## 2022-08-19 PROCEDURE — 82043 UR ALBUMIN QUANTITATIVE: CPT

## 2022-08-19 PROCEDURE — 82570 ASSAY OF URINE CREATININE: CPT

## 2022-08-19 PROCEDURE — 83036 HEMOGLOBIN GLYCOSYLATED A1C: CPT

## 2022-08-19 PROCEDURE — 3046F HEMOGLOBIN A1C LEVEL >9.0%: CPT | Performed by: FAMILY MEDICINE

## 2022-08-19 PROCEDURE — 80053 COMPREHEN METABOLIC PANEL: CPT

## 2022-08-19 PROCEDURE — 84443 ASSAY THYROID STIM HORMONE: CPT

## 2022-08-19 PROCEDURE — 80061 LIPID PANEL: CPT

## 2022-08-24 ENCOUNTER — TELEPHONE (OUTPATIENT)
Dept: FAMILY MEDICINE CLINIC | Facility: CLINIC | Age: 62
End: 2022-08-24

## 2022-08-24 DIAGNOSIS — E11.65 UNCONTROLLED TYPE 2 DIABETES MELLITUS WITH HYPERGLYCEMIA, WITHOUT LONG-TERM CURRENT USE OF INSULIN (HCC): ICD-10-CM

## 2022-08-24 DIAGNOSIS — E66.01 MORBID OBESITY DUE TO EXCESS CALORIES (HCC): ICD-10-CM

## 2022-08-24 NOTE — TELEPHONE ENCOUNTER
Pt states that she received a message under her my chart/results that the doctor wanted to see her yesterday. Pt was on vacation and just saw the message. Pt did schedule an appointment to see Dr. Susana Goins on 9-12-22 this was the first available. Pt would like to know if Duy Barber can see her this Saturday 8-27-22 because she has an appointment for an EKG this date. Please, inform if the patient can be added to the schedule on 8-27-22 or when would the doctor like to see her.

## 2022-08-25 NOTE — TELEPHONE ENCOUNTER
Left message to call back. Contacted patient to assist with appointment. Patient can be added to schedule on 08/27 at 10:30 am using the res 24 slot.

## 2022-08-26 NOTE — TELEPHONE ENCOUNTER
Please review. Protocol failed / No Protocol.     Patient returned the call and scheduled appt offered for 8/27/22 at 10:30 AM        Requested Prescriptions   Pending Prescriptions Disp Refills    METFORMIN HCL 1000 MG Oral Tab [Pharmacy Med Name: metFORMIN HCL 1,000 MG TABLET] 30 tablet 0     Sig: TAKE ONE TABLET BY MOUTH DAILY FOR DIABETES        Diabetes Medication Protocol Failed - 8/24/2022  7:32 PM        Failed - Last A1C < 7.5 and within past 6 months     Lab Results   Component Value Date    A1C 9.6 (H) 08/19/2022               Failed - GFR in the past 12 months        Passed - In person appointment or virtual visit in the past 6 mos or appointment in next 3 mos       Recent Outpatient Visits              3 weeks ago Uncontrolled type 2 diabetes mellitus with hyperglycemia, without long-term current use of insulin [facility]    [employer], [address], [address], [place], [place]    Office Visit    10 months ago [address] [facility], [address], [place], [place]    Office Visit    1 year ago Pain in right buttock    [employer], [address], [address], [place], [place]    Office Visit    1 year ago History of colon polyps    [employer], [facility], [place], [place], [place]    Office Visit    1 year ago Adult general medical exam    [employer], [address], [address], [place], [place]    Office Visit     Future Appointments         Provider Department Appt Notes    Tomorrow [facility] SCHEDULED [facility] final    Tomorrow Gwen Bansal, [address] [facility], [address], [place] lab result follow up., policy informed  OK to [facility] on RES24 per PCP TE 8/25/22               Passed - GFR > 50     Lab Results   Component Value Date    EGFRCR 75 08/19/2022                        Recent Outpatient Visits              3 weeks ago Uncontrolled type 2 diabetes mellitus with hyperglycemia, without long-term current use of insulin [facility]    [employer], [address], [address], [place], DO    Office Visit    10 months ago [address], [address], [address], [place], DO    Office Visit    1 year ago Pain in right buttock    [address], [address], [place], [place]    Office Visit    1 year ago History of colon polyps    [address] [facility], [address] [place] [address], [place], [place]    Office Visit    1 year ago Adult general medical exam    [address] [facility], [address], [address], [place], DO    Office Visit              Future Appointments         Provider Department Appt Notes    Tomorrow [facility] SCHEDULED RESOURCE [facility] final    Tomorrow Dionicio Issa, [address], [place] lab result follow up., policy informed  OK to [facility] on RES24 per PCP TE 8/25/22

## 2022-08-27 ENCOUNTER — OFFICE VISIT (OUTPATIENT)
Dept: FAMILY MEDICINE CLINIC | Facility: CLINIC | Age: 62
End: 2022-08-27
Payer: COMMERCIAL

## 2022-08-27 ENCOUNTER — EKG ENCOUNTER (OUTPATIENT)
Dept: LAB | Age: 62
End: 2022-08-27
Attending: FAMILY MEDICINE
Payer: COMMERCIAL

## 2022-08-27 ENCOUNTER — LAB ENCOUNTER (OUTPATIENT)
Dept: LAB | Age: 62
End: 2022-08-27
Attending: FAMILY MEDICINE
Payer: COMMERCIAL

## 2022-08-27 VITALS
WEIGHT: 192 LBS | BODY MASS INDEX: 38.71 KG/M2 | SYSTOLIC BLOOD PRESSURE: 102 MMHG | HEIGHT: 59 IN | HEART RATE: 83 BPM | DIASTOLIC BLOOD PRESSURE: 72 MMHG | TEMPERATURE: 98 F

## 2022-08-27 DIAGNOSIS — Z23 NEED FOR VACCINATION: ICD-10-CM

## 2022-08-27 DIAGNOSIS — I10 ESSENTIAL HYPERTENSION: ICD-10-CM

## 2022-08-27 DIAGNOSIS — R79.89 ELEVATED TSH: ICD-10-CM

## 2022-08-27 DIAGNOSIS — R79.89 ELEVATED TSH: Primary | ICD-10-CM

## 2022-08-27 DIAGNOSIS — I10 ESSENTIAL (PRIMARY) HYPERTENSION: ICD-10-CM

## 2022-08-27 DIAGNOSIS — E78.2 MIXED HYPERLIPIDEMIA: ICD-10-CM

## 2022-08-27 DIAGNOSIS — E11.65 UNCONTROLLED TYPE 2 DIABETES MELLITUS WITH HYPERGLYCEMIA, WITHOUT LONG-TERM CURRENT USE OF INSULIN (HCC): ICD-10-CM

## 2022-08-27 LAB
T3FREE SERPL-MCNC: 2.36 PG/ML (ref 2.4–4.2)
T4 FREE SERPL-MCNC: 1.7 NG/DL (ref 0.8–1.7)
THYROPEROXIDASE AB SERPL-ACNC: 40 U/ML (ref ?–60)
TSI SER-ACNC: 2.91 MIU/ML (ref 0.36–3.74)

## 2022-08-27 PROCEDURE — 84443 ASSAY THYROID STIM HORMONE: CPT

## 2022-08-27 PROCEDURE — 3078F DIAST BP <80 MM HG: CPT | Performed by: FAMILY MEDICINE

## 2022-08-27 PROCEDURE — 93010 ELECTROCARDIOGRAM REPORT: CPT | Performed by: FAMILY MEDICINE

## 2022-08-27 PROCEDURE — 84481 FREE ASSAY (FT-3): CPT

## 2022-08-27 PROCEDURE — 3008F BODY MASS INDEX DOCD: CPT | Performed by: FAMILY MEDICINE

## 2022-08-27 PROCEDURE — 36415 COLL VENOUS BLD VENIPUNCTURE: CPT

## 2022-08-27 PROCEDURE — 86376 MICROSOMAL ANTIBODY EACH: CPT

## 2022-08-27 PROCEDURE — 84439 ASSAY OF FREE THYROXINE: CPT

## 2022-08-27 PROCEDURE — 90677 PCV20 VACCINE IM: CPT | Performed by: FAMILY MEDICINE

## 2022-08-27 PROCEDURE — 93005 ELECTROCARDIOGRAM TRACING: CPT

## 2022-08-27 PROCEDURE — 99214 OFFICE O/P EST MOD 30 MIN: CPT | Performed by: FAMILY MEDICINE

## 2022-08-27 PROCEDURE — 3074F SYST BP LT 130 MM HG: CPT | Performed by: FAMILY MEDICINE

## 2022-08-27 PROCEDURE — 90471 IMMUNIZATION ADMIN: CPT | Performed by: FAMILY MEDICINE

## 2022-08-27 RX ORDER — EMPAGLIFLOZIN 25 MG/1
25 TABLET, FILM COATED ORAL DAILY
Qty: 90 TABLET | Refills: 1 | Status: SHIPPED | OUTPATIENT
Start: 2022-08-27 | End: 2022-11-25

## 2022-08-27 RX ORDER — HYDROCHLOROTHIAZIDE 12.5 MG/1
12.5 TABLET ORAL DAILY
Qty: 90 TABLET | Refills: 3 | Status: SHIPPED | OUTPATIENT
Start: 2022-08-27

## 2022-08-27 RX ORDER — ATORVASTATIN CALCIUM 20 MG/1
20 TABLET, FILM COATED ORAL NIGHTLY
Qty: 90 TABLET | Refills: 3 | Status: SHIPPED | OUTPATIENT
Start: 2022-08-27

## 2022-08-27 RX ORDER — LISINOPRIL 5 MG/1
5 TABLET ORAL DAILY
Qty: 90 TABLET | Refills: 3 | Status: SHIPPED | OUTPATIENT
Start: 2022-08-27

## 2022-08-27 NOTE — PATIENT INSTRUCTIONS
Go down to repeat your thyroid labs today. I added Jardiance 25 mg to your metformin for your uncontrolled diabetes.

## 2022-09-24 DIAGNOSIS — E11.65 UNCONTROLLED TYPE 2 DIABETES MELLITUS WITH HYPERGLYCEMIA, WITHOUT LONG-TERM CURRENT USE OF INSULIN (HCC): ICD-10-CM

## 2022-09-24 DIAGNOSIS — E66.01 MORBID OBESITY DUE TO EXCESS CALORIES (HCC): ICD-10-CM

## 2022-09-24 NOTE — TELEPHONE ENCOUNTER
Please review; protocol failed.     Requested Prescriptions   Pending Prescriptions Disp Refills    METFORMIN HCL 1000 MG Oral Tab [Pharmacy Med Name: metFORMIN HCL 1,000 MG TABLET] 30 tablet 0     Sig: TAKE ONE TABLET BY MOUTH DAILY        Diabetes Medication Protocol Failed - 9/24/2022  5:34 AM        Failed - Last A1C < 7.5 and within past 6 months     Lab Results   Component Value Date    A1C 9.6 (H) 08/19/2022               Passed - In person appointment or virtual visit in the past 6 mos or appointment in next 3 mos       Recent Outpatient Visits              4 weeks ago Elevated TSH    [address], [place], [place]    Office Visit    1 month ago Uncontrolled type 2 diabetes mellitus with hyperglycemia, without long-term current use of insulin ([address]    [employer] [address], [address], [place], [place]    Office Visit    11 months ago [address] [facility] [address], [place], [place]    Office Visit    1 year ago Pain in right buttock    [place] [employer], [address], [address], [place], [place]    Office Visit    1 year ago History of colon [address] [place] [address] Drive, [address] [facility], [place], [place], [place], [facility]    Office Visit                 Passed - EGFRCR or GFRNAA > 50     GFR Evaluation  EGFRCR: 75 , resulted on 8/19/2022            Passed - GFR in the past 12 months                  Recent Outpatient Visits              4 weeks ago Elevated TSH    [address], [place], [place]    Office Visit    1 month ago Uncontrolled type 2 diabetes mellitus with hyperglycemia, without long-term current use of insulin ([address]    [employer] [address], [address], [place], [place]    Office Visit    11 months ago [address], [address], [address], [place], DO    Office Visit    1 year ago Pain in right buttock    [place] [employer], [address], [address], [place], [place]    Office Visit    1 year ago History of colon polyps    [facility], [facility], [address] [facility], [address], [place]    Office Visit

## 2023-04-17 ENCOUNTER — LAB ENCOUNTER (OUTPATIENT)
Dept: LAB | Age: 63
End: 2023-04-17
Attending: FAMILY MEDICINE
Payer: COMMERCIAL

## 2023-04-17 ENCOUNTER — HOSPITAL ENCOUNTER (OUTPATIENT)
Dept: GENERAL RADIOLOGY | Age: 63
Discharge: HOME | End: 2023-04-17
Attending: FAMILY MEDICINE
Payer: COMMERCIAL

## 2023-04-17 ENCOUNTER — OFFICE VISIT (OUTPATIENT)
Dept: FAMILY MEDICINE CLINIC | Facility: CLINIC | Age: 63
End: 2023-04-17

## 2023-04-17 VITALS
HEIGHT: 59 IN | TEMPERATURE: 98 F | BODY MASS INDEX: 36.89 KG/M2 | SYSTOLIC BLOOD PRESSURE: 130 MMHG | HEART RATE: 80 BPM | WEIGHT: 183 LBS | DIASTOLIC BLOOD PRESSURE: 80 MMHG

## 2023-04-17 DIAGNOSIS — E11.65 UNCONTROLLED TYPE 2 DIABETES MELLITUS WITH HYPERGLYCEMIA, WITHOUT LONG-TERM CURRENT USE OF INSULIN (HCC): Primary | ICD-10-CM

## 2023-04-17 DIAGNOSIS — E11.65 UNCONTROLLED TYPE 2 DIABETES MELLITUS WITH HYPERGLYCEMIA, WITHOUT LONG-TERM CURRENT USE OF INSULIN (HCC): ICD-10-CM

## 2023-04-17 DIAGNOSIS — I10 ESSENTIAL HYPERTENSION: ICD-10-CM

## 2023-04-17 DIAGNOSIS — M25.551 CHRONIC RIGHT HIP PAIN: ICD-10-CM

## 2023-04-17 DIAGNOSIS — G89.29 CHRONIC RIGHT HIP PAIN: ICD-10-CM

## 2023-04-17 DIAGNOSIS — E78.2 MIXED HYPERLIPIDEMIA: ICD-10-CM

## 2023-04-17 LAB
ANION GAP SERPL CALC-SCNC: 2 MMOL/L (ref 0–18)
BUN BLD-MCNC: 15 MG/DL (ref 7–18)
BUN/CREAT SERPL: 18.3 (ref 10–20)
CALCIUM BLD-MCNC: 9.7 MG/DL (ref 8.5–10.1)
CHLORIDE SERPL-SCNC: 104 MMOL/L (ref 98–112)
CO2 SERPL-SCNC: 32 MMOL/L (ref 21–32)
CREAT BLD-MCNC: 0.82 MG/DL
EST. AVERAGE GLUCOSE BLD GHB EST-MCNC: 186 MG/DL (ref 68–126)
FASTING STATUS PATIENT QL REPORTED: NO
GFR SERPLBLD BASED ON 1.73 SQ M-ARVRAT: 81 ML/MIN/1.73M2 (ref 60–?)
GLUCOSE BLD-MCNC: 184 MG/DL (ref 70–99)
HBA1C MFR BLD: 8.1 % (ref ?–5.7)
OSMOLALITY SERPL CALC.SUM OF ELEC: 292 MOSM/KG (ref 275–295)
POTASSIUM SERPL-SCNC: 4.3 MMOL/L (ref 3.5–5.1)
SODIUM SERPL-SCNC: 138 MMOL/L (ref 136–145)

## 2023-04-17 PROCEDURE — 3075F SYST BP GE 130 - 139MM HG: CPT | Performed by: FAMILY MEDICINE

## 2023-04-17 PROCEDURE — 99214 OFFICE O/P EST MOD 30 MIN: CPT | Performed by: FAMILY MEDICINE

## 2023-04-17 PROCEDURE — 3079F DIAST BP 80-89 MM HG: CPT | Performed by: FAMILY MEDICINE

## 2023-04-17 PROCEDURE — 36415 COLL VENOUS BLD VENIPUNCTURE: CPT

## 2023-04-17 PROCEDURE — 83036 HEMOGLOBIN GLYCOSYLATED A1C: CPT

## 2023-04-17 PROCEDURE — 80048 BASIC METABOLIC PNL TOTAL CA: CPT

## 2023-04-17 PROCEDURE — 84443 ASSAY THYROID STIM HORMONE: CPT | Performed by: FAMILY MEDICINE

## 2023-04-17 PROCEDURE — 73502 X-RAY EXAM HIP UNI 2-3 VIEWS: CPT | Performed by: FAMILY MEDICINE

## 2023-04-17 PROCEDURE — 3008F BODY MASS INDEX DOCD: CPT | Performed by: FAMILY MEDICINE

## 2023-04-18 DIAGNOSIS — R63.4 WEIGHT LOSS: Primary | ICD-10-CM

## 2023-04-18 DIAGNOSIS — E03.9 ACQUIRED HYPOTHYROIDISM: ICD-10-CM

## 2023-04-18 DIAGNOSIS — E11.65 UNCONTROLLED TYPE 2 DIABETES MELLITUS WITH HYPERGLYCEMIA, WITHOUT LONG-TERM CURRENT USE OF INSULIN (HCC): ICD-10-CM

## 2023-04-18 LAB — TSI SER-ACNC: 4.69 MIU/ML (ref 0.36–3.74)

## 2023-04-18 RX ORDER — METFORMIN HYDROCHLORIDE 500 MG/1
TABLET, EXTENDED RELEASE ORAL
Qty: 270 TABLET | Refills: 1 | Status: SHIPPED | OUTPATIENT
Start: 2023-04-18 | End: 2023-10-15

## 2023-04-18 RX ORDER — LEVOTHYROXINE SODIUM 0.03 MG/1
25 TABLET ORAL
Qty: 90 TABLET | Refills: 0 | Status: SHIPPED | OUTPATIENT
Start: 2023-04-18

## 2023-07-14 DIAGNOSIS — E03.9 ACQUIRED HYPOTHYROIDISM: ICD-10-CM

## 2023-07-14 RX ORDER — LEVOTHYROXINE SODIUM 0.03 MG/1
25 TABLET ORAL
Qty: 90 TABLET | Refills: 1 | Status: SHIPPED | OUTPATIENT
Start: 2023-07-14

## 2023-07-14 NOTE — TELEPHONE ENCOUNTER
Please review. Protocol failed / Has no protocol.      Requested Prescriptions   Pending Prescriptions Disp Refills    LEVOTHYROXINE 25 MCG Oral Tab [Pharmacy Med Name: LEVOTHYROXINE 25 MCG TABLET] 90 tablet 0     Sig: Take 1 tablet (25 mcg total) by mouth before breakfast.       Thyroid Medication Protocol Failed - 7/14/2023  5:04 AM        Failed - Last TSH value is normal     Lab Results   Component Value Date    TSH 4.690 (H) 04/17/2023    TSHT4 3.89 12/14/2017                 Passed - TSH in past 12 months        Passed - In person appointment or virtual visit in the past 12 mos or appointment in next 3 mos     Recent Outpatient Visits              2 months ago Uncontrolled type 2 diabetes mellitus with hyperglycemia, without long-term current use of insulin ([address]    [address], [address], [place], [place]    Office Visit    10 months ago Elevated TSH    Kourtney Salazar [address], [place], [place]    Office Visit    11 months ago Uncontrolled type 2 diabetes mellitus with hyperglycemia, without long-term current use of insulin ([address]    [address], [address], [place], [place]    Office Visit    1 year ago [address], [address], [place], [place]    Office Visit    2 years ago Pain in right buttock    Kourtney Salazar [address], [place], [place]    Office Visit                            Recent Outpatient Visits              2 months ago Uncontrolled type 2 diabetes mellitus with hyperglycemia, without long-term current use of insulin ([address]    [address], [address], [place], [place]    Office Visit    10 months ago Elevated TSH    Noel Garcia, [place]    Office Visit    11 months ago Uncontrolled type 2 diabetes mellitus with hyperglycemia, without long-term current use of insulin [facility])    [address], [address], [place], [place]    Office Visit    1 year ago [address], [address], [place], [place]    Office Visit    2 years ago Pain in right buttock    [facility], [address], [address], [place], [place]    Office Visit

## 2023-08-07 ENCOUNTER — OFFICE VISIT (OUTPATIENT)
Dept: FAMILY MEDICINE CLINIC | Facility: CLINIC | Age: 63
End: 2023-08-07

## 2023-08-07 VITALS
WEIGHT: 182 LBS | TEMPERATURE: 97 F | HEART RATE: 75 BPM | HEIGHT: 59 IN | SYSTOLIC BLOOD PRESSURE: 109 MMHG | BODY MASS INDEX: 36.69 KG/M2 | DIASTOLIC BLOOD PRESSURE: 76 MMHG

## 2023-08-07 DIAGNOSIS — Z00.00 ADULT GENERAL MEDICAL EXAM: Primary | ICD-10-CM

## 2023-08-07 DIAGNOSIS — I10 ESSENTIAL HYPERTENSION: ICD-10-CM

## 2023-08-07 DIAGNOSIS — F41.9 ANXIETY: ICD-10-CM

## 2023-08-07 DIAGNOSIS — E03.9 ACQUIRED HYPOTHYROIDISM: ICD-10-CM

## 2023-08-07 DIAGNOSIS — Z12.31 VISIT FOR SCREENING MAMMOGRAM: ICD-10-CM

## 2023-08-07 DIAGNOSIS — E11.65 UNCONTROLLED TYPE 2 DIABETES MELLITUS WITH HYPERGLYCEMIA, WITHOUT LONG-TERM CURRENT USE OF INSULIN (HCC): ICD-10-CM

## 2023-08-07 DIAGNOSIS — R00.2 HEART PALPITATIONS: ICD-10-CM

## 2023-08-07 DIAGNOSIS — E78.2 MIXED HYPERLIPIDEMIA: ICD-10-CM

## 2023-08-07 DIAGNOSIS — F32.A DEPRESSIVE DISORDER: ICD-10-CM

## 2023-08-07 RX ORDER — ATORVASTATIN CALCIUM 20 MG/1
20 TABLET, FILM COATED ORAL NIGHTLY
Qty: 90 TABLET | Refills: 3 | Status: SHIPPED | OUTPATIENT
Start: 2023-08-07

## 2023-08-07 RX ORDER — HYDROCHLOROTHIAZIDE 12.5 MG/1
12.5 TABLET ORAL DAILY
Qty: 90 TABLET | Refills: 3 | Status: SHIPPED | OUTPATIENT
Start: 2023-08-07

## 2023-08-07 RX ORDER — ESCITALOPRAM OXALATE 10 MG/1
10 TABLET ORAL DAILY
Qty: 90 TABLET | Refills: 0 | Status: SHIPPED | OUTPATIENT
Start: 2023-08-07

## 2023-08-07 RX ORDER — LISINOPRIL 5 MG/1
5 TABLET ORAL DAILY
Qty: 90 TABLET | Refills: 3 | Status: SHIPPED | OUTPATIENT
Start: 2023-08-07

## 2023-08-26 ENCOUNTER — LAB ENCOUNTER (OUTPATIENT)
Dept: LAB | Age: 63
End: 2023-08-26
Attending: FAMILY MEDICINE
Payer: COMMERCIAL

## 2023-08-26 DIAGNOSIS — E11.65 UNCONTROLLED TYPE 2 DIABETES MELLITUS WITH HYPERGLYCEMIA, WITHOUT LONG-TERM CURRENT USE OF INSULIN (HCC): ICD-10-CM

## 2023-08-26 DIAGNOSIS — Z00.00 ADULT GENERAL MEDICAL EXAM: ICD-10-CM

## 2023-08-26 DIAGNOSIS — E03.9 ACQUIRED HYPOTHYROIDISM: ICD-10-CM

## 2023-08-26 LAB
ALBUMIN SERPL-MCNC: 3.5 G/DL (ref 3.4–5)
ALBUMIN/GLOB SERPL: 1 {RATIO} (ref 1–2)
ALP LIVER SERPL-CCNC: 78 U/L
ALT SERPL-CCNC: 29 U/L
ANION GAP SERPL CALC-SCNC: 4 MMOL/L (ref 0–18)
AST SERPL-CCNC: 12 U/L (ref 15–37)
BASOPHILS # BLD AUTO: 0.06 X10(3) UL (ref 0–0.2)
BASOPHILS NFR BLD AUTO: 0.7 %
BILIRUB SERPL-MCNC: 0.8 MG/DL (ref 0.1–2)
BUN BLD-MCNC: 16 MG/DL (ref 7–18)
CALCIUM BLD-MCNC: 9.3 MG/DL (ref 8.5–10.1)
CHLORIDE SERPL-SCNC: 106 MMOL/L (ref 98–112)
CHOLEST SERPL-MCNC: 132 MG/DL (ref ?–200)
CO2 SERPL-SCNC: 30 MMOL/L (ref 21–32)
CREAT BLD-MCNC: 0.86 MG/DL
CREAT UR-SCNC: 193 MG/DL
EGFRCR SERPLBLD CKD-EPI 2021: 77 ML/MIN/1.73M2 (ref 60–?)
EOSINOPHIL # BLD AUTO: 0.36 X10(3) UL (ref 0–0.7)
EOSINOPHIL NFR BLD AUTO: 4.4 %
ERYTHROCYTE [DISTWIDTH] IN BLOOD BY AUTOMATED COUNT: 13 %
EST. AVERAGE GLUCOSE BLD GHB EST-MCNC: 223 MG/DL (ref 68–126)
FASTING PATIENT LIPID ANSWER: YES
FASTING STATUS PATIENT QL REPORTED: YES
GLOBULIN PLAS-MCNC: 3.6 G/DL (ref 2.8–4.4)
GLUCOSE BLD-MCNC: 207 MG/DL (ref 70–99)
HBA1C MFR BLD: 9.4 % (ref ?–5.7)
HCT VFR BLD AUTO: 42.4 %
HDLC SERPL-MCNC: 41 MG/DL (ref 40–59)
HGB BLD-MCNC: 13.8 G/DL
IMM GRANULOCYTES # BLD AUTO: 0.02 X10(3) UL (ref 0–1)
IMM GRANULOCYTES NFR BLD: 0.2 %
LDLC SERPL CALC-MCNC: 72 MG/DL (ref ?–100)
LYMPHOCYTES # BLD AUTO: 2.3 X10(3) UL (ref 1–4)
LYMPHOCYTES NFR BLD AUTO: 28.3 %
MCH RBC QN AUTO: 29.4 PG (ref 26–34)
MCHC RBC AUTO-ENTMCNC: 32.5 G/DL (ref 31–37)
MCV RBC AUTO: 90.4 FL
MICROALBUMIN UR-MCNC: 1.34 MG/DL
MICROALBUMIN/CREAT 24H UR-RTO: 6.9 UG/MG (ref ?–30)
MONOCYTES # BLD AUTO: 0.62 X10(3) UL (ref 0.1–1)
MONOCYTES NFR BLD AUTO: 7.6 %
NEUTROPHILS # BLD AUTO: 4.76 X10 (3) UL (ref 1.5–7.7)
NEUTROPHILS # BLD AUTO: 4.76 X10(3) UL (ref 1.5–7.7)
NEUTROPHILS NFR BLD AUTO: 58.8 %
NONHDLC SERPL-MCNC: 91 MG/DL (ref ?–130)
OSMOLALITY SERPL CALC.SUM OF ELEC: 297 MOSM/KG (ref 275–295)
PLATELET # BLD AUTO: 240 10(3)UL (ref 150–450)
POTASSIUM SERPL-SCNC: 4.3 MMOL/L (ref 3.5–5.1)
PROT SERPL-MCNC: 7.1 G/DL (ref 6.4–8.2)
RBC # BLD AUTO: 4.69 X10(6)UL
SODIUM SERPL-SCNC: 140 MMOL/L (ref 136–145)
TRIGL SERPL-MCNC: 104 MG/DL (ref 30–149)
TSI SER-ACNC: 2.34 MIU/ML (ref 0.36–3.74)
VLDLC SERPL CALC-MCNC: 16 MG/DL (ref 0–30)
WBC # BLD AUTO: 8.1 X10(3) UL (ref 4–11)

## 2023-08-26 PROCEDURE — 83036 HEMOGLOBIN GLYCOSYLATED A1C: CPT

## 2023-08-26 PROCEDURE — 84443 ASSAY THYROID STIM HORMONE: CPT

## 2023-08-26 PROCEDURE — 82043 UR ALBUMIN QUANTITATIVE: CPT

## 2023-08-26 PROCEDURE — 85025 COMPLETE CBC W/AUTO DIFF WBC: CPT

## 2023-08-26 PROCEDURE — 82570 ASSAY OF URINE CREATININE: CPT

## 2023-08-26 PROCEDURE — 80061 LIPID PANEL: CPT

## 2023-08-26 PROCEDURE — 36415 COLL VENOUS BLD VENIPUNCTURE: CPT

## 2023-08-26 PROCEDURE — 80053 COMPREHEN METABOLIC PANEL: CPT

## 2023-08-26 PROCEDURE — 3061F NEG MICROALBUMINURIA REV: CPT | Performed by: PHYSICIAN ASSISTANT

## 2023-08-26 PROCEDURE — 3046F HEMOGLOBIN A1C LEVEL >9.0%: CPT | Performed by: PHYSICIAN ASSISTANT

## 2023-09-17 ENCOUNTER — OFFICE VISIT (OUTPATIENT)
Dept: FAMILY MEDICINE CLINIC | Facility: CLINIC | Age: 63
End: 2023-09-17
Payer: COMMERCIAL

## 2023-09-17 VITALS
RESPIRATION RATE: 18 BRPM | WEIGHT: 181 LBS | HEART RATE: 82 BPM | BODY MASS INDEX: 36.49 KG/M2 | SYSTOLIC BLOOD PRESSURE: 114 MMHG | DIASTOLIC BLOOD PRESSURE: 74 MMHG | HEIGHT: 59 IN | TEMPERATURE: 98 F | OXYGEN SATURATION: 96 %

## 2023-09-17 DIAGNOSIS — U07.1 COVID: Primary | ICD-10-CM

## 2023-09-17 LAB
OPERATOR ID: ABNORMAL
POCT LOT NUMBER: ABNORMAL
RAPID SARS-COV-2 BY PCR: DETECTED

## 2023-09-17 PROCEDURE — U0002 COVID-19 LAB TEST NON-CDC: HCPCS | Performed by: PHYSICIAN ASSISTANT

## 2023-09-17 PROCEDURE — 99214 OFFICE O/P EST MOD 30 MIN: CPT | Performed by: PHYSICIAN ASSISTANT

## 2023-09-17 PROCEDURE — 3008F BODY MASS INDEX DOCD: CPT | Performed by: PHYSICIAN ASSISTANT

## 2023-09-17 PROCEDURE — 3074F SYST BP LT 130 MM HG: CPT | Performed by: PHYSICIAN ASSISTANT

## 2023-09-17 PROCEDURE — 3078F DIAST BP <80 MM HG: CPT | Performed by: PHYSICIAN ASSISTANT

## 2023-10-06 ENCOUNTER — NURSE TRIAGE (OUTPATIENT)
Dept: FAMILY MEDICINE CLINIC | Facility: CLINIC | Age: 63
End: 2023-10-06

## 2023-10-06 NOTE — TELEPHONE ENCOUNTER
Action Requested: Summary for Provider     []  Critical Lab, Recommendations Needed  [] Need Additional Advice  []   FYI    []   Need Orders  [] Need Medications Sent to Pharmacy  []  Other     SUMMARY: Per protocol: OV. Patient is requesting a video visit to discuss anxiety/panic attack. Offered other providers but patient only wants to talk to Dr. Eartha Shone. Future Appointments   Date Time Provider Dimitris Scales   10/9/2023 10:00 AM Shiva Daniels, DO [facility]   11/13/2023  9:45 AM Ralph Monterroso, DO [facility]     Reason for call: Anxiety  Onset: Data Unavailable    Patient states she is having issues with her grandson. Patient was last seen on 8/7/23 with similar issues. Did not want to go into detail about the situation. She states she's having anxiety/panic. Denies suicidal thoughts, chest pain or shortness of breath. Reason for Disposition   Patient wants to be seen    Protocols used:  Anxiety and Panic Attack-A-OH

## 2023-10-07 ENCOUNTER — EKG ENCOUNTER (OUTPATIENT)
Dept: LAB | Age: 63
End: 2023-10-07
Attending: FAMILY MEDICINE
Payer: COMMERCIAL

## 2023-10-07 DIAGNOSIS — I10 ESSENTIAL HYPERTENSION: ICD-10-CM

## 2023-10-07 DIAGNOSIS — R00.2 HEART PALPITATIONS: ICD-10-CM

## 2023-10-07 PROCEDURE — 93010 ELECTROCARDIOGRAM REPORT: CPT | Performed by: FAMILY MEDICINE

## 2023-10-07 PROCEDURE — 93005 ELECTROCARDIOGRAM TRACING: CPT

## 2023-10-08 LAB
ATRIAL RATE: 91 BPM
P AXIS: 20 DEGREES
P-R INTERVAL: 162 MS
Q-T INTERVAL: 364 MS
QRS DURATION: 74 MS
QTC CALCULATION (BEZET): 447 MS
R AXIS: 63 DEGREES
T AXIS: 32 DEGREES
VENTRICULAR RATE: 91 BPM

## 2023-10-09 ENCOUNTER — TELEPHONE (OUTPATIENT)
Dept: FAMILY MEDICINE CLINIC | Facility: CLINIC | Age: 63
End: 2023-10-09

## 2023-10-10 ENCOUNTER — TELEPHONE (OUTPATIENT)
Dept: FAMILY MEDICINE CLINIC | Facility: CLINIC | Age: 63
End: 2023-10-10

## 2023-10-18 ENCOUNTER — TELEPHONE (OUTPATIENT)
Dept: FAMILY MEDICINE CLINIC | Facility: CLINIC | Age: 63
End: 2023-10-18

## 2023-10-18 NOTE — TELEPHONE ENCOUNTER
Returned pt call, pt calling on status on forms. Pt informed forms received 10/10/23 and current turn around time is 10-15 business days. Pt verbalized understanding.

## 2023-10-24 NOTE — TELEPHONE ENCOUNTER
Type of Leave:  continuous   Reason for Leave: depression, anxiety   Start date of leave: 10/10/23  How much time needed?: 11/6/23  Forms Due Date: asap   Was Fee and Turnaround info Given?: yes         Details are also in a letter that Dr. Logan De Paz wrote for pt.

## 2023-10-30 NOTE — TELEPHONE ENCOUNTER
Dr. Logan De Paz      *The ACKNOWLEDGE button has been moved to the top right ribbon*    Please sign off on form if you agree to: Disab due to pt's anxiety and depression. Start: 10/10/23--End: 11/6/23  (place your signature on the first page only)    -From your Inbasket, Highlight the patient and click Chart   -Double click the 92/87/04 Forms Completion telephone encounter  -Tristin Torres down to the Media section   -Click the blue Hyperlink: Disab Dr. Logan De Paz 54/51/44   -Click Acknowledge located in the top right ribbon/menu   -Drag the mouse into the blank space of the document and a + sign will appear. Left click to   electronically sign the document.      Thank you,  [facility]

## 2023-10-31 NOTE — TELEPHONE ENCOUNTER
Forms completed and faxed to [facility] at [phone], confirmation rcv'd. Ascade message sent to pt.

## 2023-11-12 DIAGNOSIS — E11.65 UNCONTROLLED TYPE 2 DIABETES MELLITUS WITH HYPERGLYCEMIA, WITHOUT LONG-TERM CURRENT USE OF INSULIN (HCC): ICD-10-CM

## 2023-11-13 ENCOUNTER — LAB ENCOUNTER (OUTPATIENT)
Dept: LAB | Age: 63
End: 2023-11-13
Attending: FAMILY MEDICINE
Payer: COMMERCIAL

## 2023-11-13 ENCOUNTER — OFFICE VISIT (OUTPATIENT)
Dept: FAMILY MEDICINE CLINIC | Facility: CLINIC | Age: 63
End: 2023-11-13

## 2023-11-13 VITALS
WEIGHT: 172.38 LBS | BODY MASS INDEX: 34.75 KG/M2 | DIASTOLIC BLOOD PRESSURE: 74 MMHG | SYSTOLIC BLOOD PRESSURE: 111 MMHG | HEART RATE: 84 BPM | TEMPERATURE: 97 F | HEIGHT: 59 IN

## 2023-11-13 DIAGNOSIS — E11.65 UNCONTROLLED TYPE 2 DIABETES MELLITUS WITH HYPERGLYCEMIA, WITHOUT LONG-TERM CURRENT USE OF INSULIN (HCC): ICD-10-CM

## 2023-11-13 DIAGNOSIS — E11.65 UNCONTROLLED TYPE 2 DIABETES MELLITUS WITH HYPERGLYCEMIA, WITHOUT LONG-TERM CURRENT USE OF INSULIN (HCC): Primary | ICD-10-CM

## 2023-11-13 DIAGNOSIS — Z23 NEED FOR VACCINATION: ICD-10-CM

## 2023-11-13 LAB
ANION GAP SERPL CALC-SCNC: 10 MMOL/L (ref 0–18)
BUN BLD-MCNC: 11 MG/DL (ref 9–23)
BUN/CREAT SERPL: 12.6 (ref 10–20)
CALCIUM BLD-MCNC: 10 MG/DL (ref 8.7–10.4)
CHLORIDE SERPL-SCNC: 100 MMOL/L (ref 98–112)
CO2 SERPL-SCNC: 29 MMOL/L (ref 21–32)
CREAT BLD-MCNC: 0.87 MG/DL
EGFRCR SERPLBLD CKD-EPI 2021: 75 ML/MIN/1.73M2 (ref 60–?)
EST. AVERAGE GLUCOSE BLD GHB EST-MCNC: 157 MG/DL (ref 68–126)
FASTING STATUS PATIENT QL REPORTED: NO
GLUCOSE BLD-MCNC: 117 MG/DL (ref 70–99)
HBA1C MFR BLD: 7.1 % (ref ?–5.7)
OSMOLALITY SERPL CALC.SUM OF ELEC: 288 MOSM/KG (ref 275–295)
POTASSIUM SERPL-SCNC: 3.8 MMOL/L (ref 3.5–5.1)
SODIUM SERPL-SCNC: 139 MMOL/L (ref 136–145)

## 2023-11-13 PROCEDURE — 99214 OFFICE O/P EST MOD 30 MIN: CPT | Performed by: FAMILY MEDICINE

## 2023-11-13 PROCEDURE — 80048 BASIC METABOLIC PNL TOTAL CA: CPT

## 2023-11-13 PROCEDURE — 3008F BODY MASS INDEX DOCD: CPT | Performed by: FAMILY MEDICINE

## 2023-11-13 PROCEDURE — 90471 IMMUNIZATION ADMIN: CPT | Performed by: FAMILY MEDICINE

## 2023-11-13 PROCEDURE — 3078F DIAST BP <80 MM HG: CPT | Performed by: FAMILY MEDICINE

## 2023-11-13 PROCEDURE — 83036 HEMOGLOBIN GLYCOSYLATED A1C: CPT

## 2023-11-13 PROCEDURE — 90686 IIV4 VACC NO PRSV 0.5 ML IM: CPT | Performed by: FAMILY MEDICINE

## 2023-11-13 PROCEDURE — 3074F SYST BP LT 130 MM HG: CPT | Performed by: FAMILY MEDICINE

## 2023-11-13 PROCEDURE — 36415 COLL VENOUS BLD VENIPUNCTURE: CPT

## 2023-11-13 RX ORDER — METFORMIN HYDROCHLORIDE 500 MG/1
TABLET, EXTENDED RELEASE ORAL
Qty: 270 TABLET | Refills: 1 | Status: SHIPPED | OUTPATIENT
Start: 2023-11-13 | End: 2024-05-11

## 2023-11-14 NOTE — TELEPHONE ENCOUNTER
Please review. Protocol failed / No protocol. Requested Prescriptions   Pending Prescriptions Disp Refills    OZEMPIC, 0.25 OR 0.5 MG/DOSE, 2 MG/3ML Subcutaneous Solution Pen-injector [Pharmacy Med Name: OZEMPIC 0.25-0.5 MG/DOSE(2 MG/3 ML)] 3 mL 0     Sig: INJECT 0.25 MG INTO THE SKIN ONCE A WEEK. CONTINUE THE 0.25 MG WEEKLY FOR 4 WEEKS AND THEN START THE 0.5 WEEKLY FROM THEN ON FOR DIABETES.        Diabetes Medication Protocol Failed - 11/12/2023  5:35 AM        Failed - Last A1C < 7.5 and within past 6 months     Lab Results   Component Value Date    A1C 7.1 (H) 11/13/2023             Passed - In person appointment or virtual visit in the past 6 mos or appointment in next 3 mos     Recent Outpatient Visits              Today Uncontrolled type 2 diabetes mellitus with hyperglycemia, without long-term current use of insulin ([address]    [address], [address], [place], [place]    Office Visit    1 week ago [address] [place] [address], [address], [place], [place]    Telemedicine    1 month ago [address] [place], [place] [address]Noel,     Telemedicine    1 month ago [facility], [address], [facility]    Office Visit    3 months ago Adult general medical exam    Kourtney Croft, [address], [place], [place]    Office Visit                      Passed - EGFRCR or GFRNAA > 50     GFR Evaluation  EGFRCR: 75 , resulted on 11/13/2023          Passed - GFR in the past 12 months             Recent Outpatient Visits              Today Uncontrolled type 2 diabetes mellitus with hyperglycemia, without long-term current use of insulin ([facility]    [address], [address], [address], [place], [place]    Office Visit    1 week ago [address] [place], [place]    Telemedicine    1 month ago [address] [facility], [address], [facility]    Telemedicine    1 month ago [facility], [address] [place] [address],[address], [facility]    Office Visit    3 months ago Adult general medical exam    Rupal Echevarria [address], [place], [place]    Office Visit

## 2023-11-16 RX ORDER — SEMAGLUTIDE 0.68 MG/ML
0.5 INJECTION, SOLUTION SUBCUTANEOUS WEEKLY
Qty: 3 ML | Refills: 0 | Status: SHIPPED | OUTPATIENT
Start: 2023-11-16

## 2023-11-16 NOTE — TELEPHONE ENCOUNTER
Patient was seen in office on 11/13/23 with PCP and A1c was 7.1. Please advise on additional refills.

## 2023-11-30 NOTE — TELEPHONE ENCOUNTER
Dr. Stas Agarwal     *The ACKNOWLEDGE button has been moved to the top right ribbon*    Please sign off on form if you agree to: Disab questionnaire due to anxiety and depression Start date 10/10/23-RTW 11/06/23.  (place your signature on the first page only)    -From your Inbasket, Highlight the patient and click Chart   -Double click the 33/86/7718 Forms Completion telephone encounter  -Scroll down to the Media section   -Click the blue Hyperlink: Disab quest Dr Stas Agarwal 11/56/05  -Click Acknowledge located in the top right ribbon/menu   -Drag the mouse into the blank space of the document and a + sign will appear. Left click to   electronically sign the document.      Thank you,      Dominic Connolly

## 2023-12-04 NOTE — TELEPHONE ENCOUNTER
Disab form faxed to [facility] [phone], right fax confirmation received. Personalingt message sent.

## 2023-12-26 DIAGNOSIS — E11.65 UNCONTROLLED TYPE 2 DIABETES MELLITUS WITH HYPERGLYCEMIA, WITHOUT LONG-TERM CURRENT USE OF INSULIN (HCC): ICD-10-CM

## 2023-12-26 NOTE — TELEPHONE ENCOUNTER
Patient has no refills left for rx Ozempic and will be out of medication. Please update at [phone],[email or web address].   Suzette/pharm-[place]

## 2023-12-27 RX ORDER — SEMAGLUTIDE 0.68 MG/ML
0.5 INJECTION, SOLUTION SUBCUTANEOUS WEEKLY
Qty: 3 ML | Refills: 3 | Status: SHIPPED | OUTPATIENT
Start: 2023-12-27

## 2023-12-27 NOTE — TELEPHONE ENCOUNTER
Refill passed per [place] [address] [place] protocol. Requested Prescriptions   Pending Prescriptions Disp Refills    semaglutide (OZEMPIC, 0.25 OR 0.5 MG/DOSE,) 2 MG/3ML Subcutaneous Solution Pen-injector 3 mL 0     Sig: Inject 0.5 mg into the skin once a week.        Diabetes Medication Protocol Passed - 12/26/2023  3:45 PM        Passed - Last A1C < 7.5 and within past 6 months     Lab Results   Component Value Date    A1C 7.1 (H) 11/13/2023             Passed - In person appointment or virtual visit in the past 6 mos or appointment in next 3 mos     Recent Outpatient Visits              1 month ago Uncontrolled type 2 diabetes mellitus with hyperglycemia, without long-term current use of insulin ([facility]    [address], [address], [address], [place], [place]    Office Visit    1 month ago [address] [place] [address], [address], [place], [place]    Telemedicine    2 months ago [address] [place] [address] [place] Beverly Saunders,     Telemedicine    3 months ago [facility], [address], [address], [facility]    Office Visit    4 months ago Adult general medical exam    [address], [address], [address], [place], [place]    Office Visit                      Passed - EGFRCR or GFRNAA > 50     GFR Evaluation  EGFRCR: 75 , resulted on 11/13/2023          Passed - GFR in the past 12 months           Recent Outpatient Visits              1 month ago Uncontrolled type 2 diabetes mellitus with hyperglycemia, without long-term current use of insulin ([facility]    [address], [address], [address], [place], [place]    Office Visit    1 month ago [facility] [place] [place]    Telemedicine    2 months ago [facility] [address] [place], [place]    Telemedicine    3 months ago [facility], [facility] Clinic, [address], [place] Duenas [facility]    Office Visit    4 months ago Adult general medical exam    [address] [facility], [address], [place], [place]    Office Visit

## 2024-01-18 ENCOUNTER — NURSE TRIAGE (OUTPATIENT)
Dept: FAMILY MEDICINE CLINIC | Facility: CLINIC | Age: 64
End: 2024-01-18

## 2024-01-18 ENCOUNTER — HOSPITAL ENCOUNTER (OUTPATIENT)
Age: 64
Discharge: HOME | End: 2024-01-18
Payer: COMMERCIAL

## 2024-01-18 VITALS
HEART RATE: 88 BPM | SYSTOLIC BLOOD PRESSURE: 112 MMHG | DIASTOLIC BLOOD PRESSURE: 72 MMHG | OXYGEN SATURATION: 99 % | TEMPERATURE: 98 F | RESPIRATION RATE: 18 BRPM

## 2024-01-18 DIAGNOSIS — R00.2 HEART PALPITATIONS: ICD-10-CM

## 2024-01-18 DIAGNOSIS — R20.2 NUMBNESS AND TINGLING IN LEFT HAND: Primary | ICD-10-CM

## 2024-01-18 DIAGNOSIS — R20.0 NUMBNESS AND TINGLING IN LEFT HAND: Primary | ICD-10-CM

## 2024-01-18 DIAGNOSIS — R42 LIGHTHEADEDNESS: ICD-10-CM

## 2024-01-18 LAB
#MXD IC: 1 X10ˆ3/UL (ref 0.1–1)
ATRIAL RATE: 81 BPM
BUN BLD-MCNC: 15 MG/DL (ref 7–18)
CHLORIDE BLD-SCNC: 101 MMOL/L (ref 98–112)
CO2 BLD-SCNC: 27 MMOL/L (ref 21–32)
CREAT BLD-MCNC: 0.8 MG/DL
EGFRCR SERPLBLD CKD-EPI 2021: 83 ML/MIN/1.73M2 (ref 60–?)
GLUCOSE BLD-MCNC: 114 MG/DL (ref 70–99)
HCT VFR BLD AUTO: 42.2 %
HCT VFR BLD CALC: 45 %
HGB BLD-MCNC: 13.7 G/DL
ISTAT IONIZED CALCIUM FOR CHEM 8: 1.24 MMOL/L (ref 1.12–1.32)
LYMPHOCYTES # BLD AUTO: 2.5 X10ˆ3/UL (ref 1–4)
LYMPHOCYTES NFR BLD AUTO: 24.4 %
MCH RBC QN AUTO: 28.8 PG (ref 26–34)
MCHC RBC AUTO-ENTMCNC: 32.5 G/DL (ref 31–37)
MCV RBC AUTO: 88.8 FL (ref 80–100)
MIXED CELL %: 9.3 %
NEUTROPHILS # BLD AUTO: 6.9 X10ˆ3/UL (ref 1.5–7.7)
NEUTROPHILS NFR BLD AUTO: 66.3 %
P AXIS: 23 DEGREES
P-R INTERVAL: 146 MS
PLATELET # BLD AUTO: 264 X10ˆ3/UL (ref 150–450)
POTASSIUM BLD-SCNC: 3.9 MMOL/L (ref 3.6–5.1)
Q-T INTERVAL: 364 MS
QRS DURATION: 72 MS
QTC CALCULATION (BEZET): 422 MS
R AXIS: 1 DEGREES
RBC # BLD AUTO: 4.75 X10ˆ6/UL
SODIUM BLD-SCNC: 143 MMOL/L (ref 136–145)
T AXIS: 39 DEGREES
TROPONIN I BLD-MCNC: <0.02 NG/ML
VENTRICULAR RATE: 81 BPM
WBC # BLD AUTO: 10.4 X10ˆ3/UL (ref 4–11)

## 2024-01-18 PROCEDURE — 93000 ELECTROCARDIOGRAM COMPLETE: CPT | Performed by: NURSE PRACTITIONER

## 2024-01-18 PROCEDURE — 80047 BASIC METABLC PNL IONIZED CA: CPT | Performed by: NURSE PRACTITIONER

## 2024-01-18 PROCEDURE — 84484 ASSAY OF TROPONIN QUANT: CPT | Performed by: NURSE PRACTITIONER

## 2024-01-18 PROCEDURE — 99214 OFFICE O/P EST MOD 30 MIN: CPT | Performed by: NURSE PRACTITIONER

## 2024-01-18 PROCEDURE — 85025 COMPLETE CBC W/AUTO DIFF WBC: CPT | Performed by: NURSE PRACTITIONER

## 2024-01-18 NOTE — ED PROVIDER NOTES
Chief Complaint   Patient presents with    Tingling       HPI:     Valeria Telles is a 63 year old female presents with a chief complaint of intermittent episodes of heart palpitations, lightheadedness, along with left arm tingling ongoing for the last 4 to 5 days.  Episodes have lasted a few seconds at a time and resolve spontaneously.  She has had no chest pain or shortness of breath.  No headache. No nausea, vomiting, diarrhea, or abdominal pain. Eating and drinking normally.     PFSH  PFSH asessment screens reviewed and agree.  Nursing note reviewed and I agree with documentation.    Social History     Socioeconomic History    Marital status:      Spouse name: Not on file    Number of children: Not on file    Years of education: Not on file    Highest education level: Not on file   Occupational History    Not on file   Tobacco Use    Smoking status: Never    Smokeless tobacco: Never   Vaping Use    Vaping Use: Never used   Substance and Sexual Activity    Alcohol use: Yes     Comment: Socially    Drug use: No    Sexual activity: Not on file   Other Topics Concern     Service Not Asked    Blood Transfusions Not Asked    Caffeine Concern Yes     Comment: Coffee, 1 cup daily    Occupational Exposure Not Asked    Hobby Hazards Not Asked    Sleep Concern Not Asked    Stress Concern Not Asked    Weight Concern Not Asked    Special Diet Not Asked    Back Care Not Asked    Exercise No    Bike Helmet Not Asked    Seat Belt Not Asked    Self-Exams Not Asked   Social History Narrative    The patient does not use an assistive device..      The patient does live in a home with stairs.     Social Determinants of Health     Financial Resource Strain: Not on file   Food Insecurity: Not on file   Transportation Needs: Not on file   Physical Activity: Not on file   Stress: Not on file   Social Connections: Not on file   Housing Stability: Not on file     Family History   Problem Relation Age of Onset    Cancer Mother          Cancer - liver    Other (Other) Father         alzheimers    Diabetes Neg     Glaucoma Neg      Family history reviewed with patient/caregiver and is not pertinent to presenting problem.      ROS:     Positive for stated complaint:   All other systems reviewed and negative except as noted above.  Constitutional and Vital Signs Reviewed.    Physical Exam:     /72   Pulse 88   Temp 97.8 °F (36.6 °C) (Temporal)   Resp 18   LMP 09/01/2014 (Approximate)   SpO2 99%   GENERAL: well developed, well nourished, well hydrated, no distress  EYES: sclera non icteric bilateral, ALINE, EOMI  HEENT: atraumatic, normocephalic, ears, nose and throat are clear  NECK: supple, no adenopathy  CARDIO: RRR without murmur  LUNGS: clear to auscultation, no RRW  GI: soft, non-tender, normal bowel sounds  NEURO: no focal deficits. Normal finger to nose. Normal gait.   EXTREMITIES: no cyanosis or edema, normal ROM  SKIN: good skin turgor, no obvious rashes    MDM/Assessment/Plan:   Orders for this encounter:    Orders Placed This Encounter    POCT CBC     Order Specific Question:   Release to patient     Answer:   Immediate    EKG 12 Lead     Order Specific Question:   Release to patient     Answer:   Immediate    POCT ISTAT chem8 cartridge    ISTAT Troponin    iStat (Chem 8)    iStat (Troponin)       Labs performed this visit:  Recent Results (from the past 10 hour(s))   EKG 12 Lead    Collection Time: 01/18/24  1:11 PM   Result Value Ref Range    Ventricular rate 81 BPM    Atrial rate 81 BPM    P-R Interval 146 ms    QRS Duration 72 ms    Q-T Interval 364 ms    QTC Calculation (Bezet) 422 ms    P Axis 23 degrees    R Axis 1 degrees    T Axis 39 degrees   POCT CBC    Collection Time: 01/18/24  1:44 PM   Result Value Ref Range    WBC IC 10.4 4.0 - 11.0 x10ˆ3/uL    RBC IC 4.75 3.80 - 5.30 X10ˆ6/uL    HGB IC 13.7 12.0 - 16.0 g/dL    HCT IC 42.2 35.0 - 48.0 %    MCV IC 88.8 80.0 - 100.0 fL    MCH IC 28.8 26.0 - 34.0 pg    MCHC IC  32.5 31.0 - 37.0 g/dL    PLT .0 150.0 - 450.0 X10ˆ3/uL    # Neutrophil 6.9 1.5 - 7.7 X10ˆ3/uL    # Lymphocyte 2.5 1.0 - 4.0 X10ˆ3/uL    # Mixed Cells 1.0 0.1 - 1.0 X10ˆ3/uL    Neutrophil % 66.3 %    Lymphocyte % 24.4 %    Mixed Cell % 9.3 %   POCT ISTAT chem8 cartridge    Collection Time: 01/18/24  1:48 PM   Result Value Ref Range    ISTAT Sodium 143 136 - 145 mmol/L    ISTAT BUN 15 7 - 18 mg/dL    ISTAT Potassium 3.9 3.6 - 5.1 mmol/L    ISTAT Chloride 101 98 - 112 mmol/L    ISTAT Ionized Calcium 1.24 1.12 - 1.32 mmol/L    ISTAT Hematocrit 45 34 - 50 %    ISTAT Glucose 114 (H) 70 - 99 mg/dL    ISTAT TCO2 27 21 - 32 mmol/L    ISTAT Creatinine 0.80 0.55 - 1.02 mg/dL    eGFR-Cr 83 >=60 mL/min/1.73m2   ISTAT Troponin    Collection Time: 01/18/24  1:54 PM   Result Value Ref Range    ISTAT Troponin <0.02 <0.045 ng/mL ng/mL       MDM:  Medical Decision Making  Patient presents today with intermittent episodes of heart palpitations and left arm tingling. She is healthy appearing in clinic. She is currently asymptomatic.  She has no chest pain, shortness of breath, headache, or dizziness.  She is neurologically intact.  Physical exam is essentially normal.  EKG is within normal limits, grossly unchanged from last.  CBC and Chem-8 within normal limits.  Troponin is negative.  Suspect this is likely stress related.  Advised close follow-up with primary care provider.  ER precautions were discussed.  Patient verbalized understanding and agreeable to plan of care.    Case discussed with Dr. Guerrero Kennedy    Amount and/or Complexity of Data Reviewed  Labs: ordered. Decision-making details documented in ED Course.     Details: CBC wnl  Chem 8 wnl  Troponin normal  ECG/medicine tests: ordered and independent interpretation performed. Decision-making details documented in ED Course.     Details: EKG: SR, rate 81, no ST segment changes, no significant changes from last EKG.       Diagnosis:    ICD-10-CM    1. Numbness and  tingling in left hand  R20.0     R20.2       2. Heart palpitations  R00.2       3. Lightheadedness  R42           All results reviewed and discussed with patient.  See AVS for detailed discharge instructions for your condition today.    Please follow up with:  No follow-up provider specified.

## 2024-01-18 NOTE — TELEPHONE ENCOUNTER
CEDRIC Monterroso  Action Requested: Summary for Provider     []  Critical Lab, Recommendations Needed  [] Need Additional Advice  [x]   FYI    []   Need Orders  [] Need Medications Sent to Pharmacy  []  Other     SUMMARY: Intermittent lightheadedness accompanied with L arm tingling and palpitations/heart racing; episodes occur about 3-4x/day, daily since onset. Advised ED/IC__> agreeable to [facility]. Refer to system/assessment protocol for yes/no answers. [See below for more details]     Reason for call: Dizziness, Palpitations, and Tingling  Onset: Friday, 1/12/24    Reason for Disposition   Neurologic deficit that was brief (now gone), ANY of the following:* Weakness of the face, arm, or leg on one side of the body* Numbness of the face, arm, or leg on one side of the body* Loss of speech or garbled speech    Protocols used: Dizziness-A-OH      Patient called states she has some intermittent lightheadedness. She states she also has some tingling of her L arm that radiates to her L hand. She also reports some palpitations/heart racing  during episodes. She has had these episodes about 3-4x/day daily since onset. Symptoms are not present now. Denies any shortness of breath, chest pain, and/or chest tightness or numbness or one-sided weakness at this time. She reports her last BM was on Monday 1/15/24; she mentions she is on Ozempic. Currently her HR is normal. Denies any nausea or vomiting. Denies any blood in the stool. Patient was advised IC/ED now --> agreeable she will go to [facility] as soon as possible, she will be at [facility] by 10 am per patient. Patient instructed any new or worsening symptoms [reviewed] seek immediate medical attention--> ED/911. Patient verbalized understanding. No further questions or concerns at this time.

## 2024-01-18 NOTE — TELEPHONE ENCOUNTER
No IC visit seen; I called patient --> I made her aware of Dr. Monterroso agreeing with IC visit, she is heading to IC now. Patient verbalized understanding. No further questions or concerns at this time.

## 2024-01-18 NOTE — DISCHARGE INSTRUCTIONS
For worsening symptoms, please go to the ER  Please follow up close with primary care provider    [Initial Evaluation] : an initial evaluation of [Abnormal ECG] : an abnormal ECG [Chest Pain] : chest pain [Hypertension] : hypertension

## 2024-03-02 DIAGNOSIS — E03.9 ACQUIRED HYPOTHYROIDISM: ICD-10-CM

## 2024-03-04 RX ORDER — LEVOTHYROXINE SODIUM 0.03 MG/1
25 TABLET ORAL
Qty: 90 TABLET | Refills: 1 | Status: SHIPPED | OUTPATIENT
Start: 2024-03-04

## 2024-03-04 NOTE — TELEPHONE ENCOUNTER
Refill passed per [facility] protocol.  Requested Prescriptions   Pending Prescriptions Disp Refills    LEVOTHYROXINE 25 MCG Oral Tab [Pharmacy Med Name: LEVOTHYROXINE 25 MCG TABLET] 30 tablet 0     Sig: TAKE 1 TABLET BY MOUTH DAILY BEFORE BREAKFAST       Thyroid Medication Protocol Passed - 3/2/2024  5:34 AM        Passed - TSH in past 12 months        Passed - Last TSH value is normal     Lab Results   Component Value Date    TSH 2.340 08/26/2023    TSHT4 3.89 12/14/2017                 Passed - In person appointment or virtual visit in the past 12 mos or appointment in next 3 mos     Recent Outpatient Visits              3 months ago Uncontrolled type 2 diabetes mellitus with hyperglycemia, without long-term current use of insulin ([facility])    [facility], [facility] Ralph Peralta,     Office Visit    4 months ago [facility],     [facility]    4 months ago An[facility],     [facility]    5 months ago COVID    [facility], [facility] Clinic, [facility] Juliann Rivera PA-C    Office Visit    7 months ago Adult general medical exam    [facility] Ralph Peralta,     Office Visit          Future Appointments         Provider Department Appt Notes    In 3 weeks ADO DEXA RM1; ADO [facility] RM1 [facility] Mamm[facility] - Fred                   Future Appointments         Provider Department Appt Notes    In 3 weeks ADO DEXA RM1; ADO [facility] RM1 [facility]           Recent Outpatient Visits              3 months ago Uncontrolled type 2 diabetes mellitus with hyperglycemia, without long-term current use of insulin (HCC)    [facility] Ralph Peralta,     Office Visit    4 months ago Anxiety    [facility], [facility], Ralph Peralta,     Telemedicine    4 months ago Anxiety    [facility], Ralph Peralta,     Telemedicine    5 months ago COVID    [facility] Juliann Rivera [facility]-C    Office Visit    7 months ago Adult general medical exam    [facility], [address], Ralph Peralta,     Office Visit

## 2024-03-13 ENCOUNTER — TELEPHONE (OUTPATIENT)
Dept: FAMILY MEDICINE CLINIC | Facility: CLINIC | Age: 64
End: 2024-03-13

## 2024-03-13 NOTE — TELEPHONE ENCOUNTER
Attempted to contact patient to schedule diabetes follow up as requested on last result note. Left message to call back. MC message also sent.

## 2024-03-27 ENCOUNTER — HOSPITAL ENCOUNTER (OUTPATIENT)
Dept: MAMMOGRAPHY | Age: 64
Discharge: HOME | End: 2024-03-27
Attending: FAMILY MEDICINE
Payer: COMMERCIAL

## 2024-03-27 DIAGNOSIS — Z12.31 VISIT FOR SCREENING MAMMOGRAM: ICD-10-CM

## 2024-03-27 PROCEDURE — 77063 BREAST TOMOSYNTHESIS BI: CPT | Performed by: FAMILY MEDICINE

## 2024-03-27 PROCEDURE — 77067 SCR MAMMO BI INCL CAD: CPT | Performed by: FAMILY MEDICINE

## 2024-04-13 DIAGNOSIS — E11.65 UNCONTROLLED TYPE 2 DIABETES MELLITUS WITH HYPERGLYCEMIA, WITHOUT LONG-TERM CURRENT USE OF INSULIN (HCC): ICD-10-CM

## 2024-04-15 ENCOUNTER — TELEPHONE (OUTPATIENT)
Dept: GENETICS | Facility: HOSPITAL | Age: 64
End: 2024-04-15

## 2024-04-15 ENCOUNTER — OFFICE VISIT (OUTPATIENT)
Dept: FAMILY MEDICINE CLINIC | Facility: CLINIC | Age: 64
End: 2024-04-15
Payer: COMMERCIAL

## 2024-04-15 ENCOUNTER — LAB ENCOUNTER (OUTPATIENT)
Dept: LAB | Age: 64
End: 2024-04-15
Attending: FAMILY MEDICINE
Payer: COMMERCIAL

## 2024-04-15 VITALS
BODY MASS INDEX: 32.46 KG/M2 | DIASTOLIC BLOOD PRESSURE: 70 MMHG | SYSTOLIC BLOOD PRESSURE: 108 MMHG | HEIGHT: 59 IN | HEART RATE: 84 BPM | WEIGHT: 161 LBS | TEMPERATURE: 97 F

## 2024-04-15 DIAGNOSIS — K59.09 OTHER CONSTIPATION: ICD-10-CM

## 2024-04-15 DIAGNOSIS — L65.9 THINNING HAIR: ICD-10-CM

## 2024-04-15 DIAGNOSIS — E11.65 UNCONTROLLED TYPE 2 DIABETES MELLITUS WITH HYPERGLYCEMIA, WITHOUT LONG-TERM CURRENT USE OF INSULIN (HCC): ICD-10-CM

## 2024-04-15 DIAGNOSIS — E66.9 OBESITY (BMI 30-39.9): ICD-10-CM

## 2024-04-15 DIAGNOSIS — E03.9 ACQUIRED HYPOTHYROIDISM: ICD-10-CM

## 2024-04-15 DIAGNOSIS — Z84.81 FAMILY HISTORY OF GENE MUTATION: ICD-10-CM

## 2024-04-15 DIAGNOSIS — I10 ESSENTIAL HYPERTENSION: ICD-10-CM

## 2024-04-15 DIAGNOSIS — E11.65 UNCONTROLLED TYPE 2 DIABETES MELLITUS WITH HYPERGLYCEMIA, WITHOUT LONG-TERM CURRENT USE OF INSULIN (HCC): Primary | ICD-10-CM

## 2024-04-15 DIAGNOSIS — R20.2 PARESTHESIA OF BOTH HANDS: ICD-10-CM

## 2024-04-15 DIAGNOSIS — E78.2 HYPERCHOLESTEROLEMIA WITH HYPERTRIGLYCERIDEMIA: ICD-10-CM

## 2024-04-15 DIAGNOSIS — H25.13 AGE-RELATED NUCLEAR CATARACT OF BOTH EYES: ICD-10-CM

## 2024-04-15 LAB
ANION GAP SERPL CALC-SCNC: 4 MMOL/L (ref 0–18)
BUN BLD-MCNC: 14 MG/DL (ref 9–23)
BUN/CREAT SERPL: 17.1 (ref 10–20)
CALCIUM BLD-MCNC: 10.3 MG/DL (ref 8.7–10.4)
CHLORIDE SERPL-SCNC: 106 MMOL/L (ref 98–112)
CO2 SERPL-SCNC: 29 MMOL/L (ref 21–32)
CREAT BLD-MCNC: 0.82 MG/DL
EGFRCR SERPLBLD CKD-EPI 2021: 81 ML/MIN/1.73M2 (ref 60–?)
EST. AVERAGE GLUCOSE BLD GHB EST-MCNC: 120 MG/DL (ref 68–126)
FASTING STATUS PATIENT QL REPORTED: NO
GLUCOSE BLD-MCNC: 150 MG/DL (ref 70–99)
HBA1C MFR BLD: 5.8 % (ref ?–5.7)
OSMOLALITY SERPL CALC.SUM OF ELEC: 291 MOSM/KG (ref 275–295)
POTASSIUM SERPL-SCNC: 3.9 MMOL/L (ref 3.5–5.1)
SODIUM SERPL-SCNC: 139 MMOL/L (ref 136–145)
TSI SER-ACNC: 2 MIU/ML (ref 0.55–4.78)
VIT B12 SERPL-MCNC: 393 PG/ML (ref 211–911)

## 2024-04-15 PROCEDURE — 99215 OFFICE O/P EST HI 40 MIN: CPT | Performed by: FAMILY MEDICINE

## 2024-04-15 PROCEDURE — 96127 BRIEF EMOTIONAL/BEHAV ASSMT: CPT | Performed by: FAMILY MEDICINE

## 2024-04-15 PROCEDURE — 3078F DIAST BP <80 MM HG: CPT | Performed by: FAMILY MEDICINE

## 2024-04-15 PROCEDURE — 84443 ASSAY THYROID STIM HORMONE: CPT

## 2024-04-15 PROCEDURE — 3008F BODY MASS INDEX DOCD: CPT | Performed by: FAMILY MEDICINE

## 2024-04-15 PROCEDURE — 82607 VITAMIN B-12: CPT

## 2024-04-15 PROCEDURE — 36415 COLL VENOUS BLD VENIPUNCTURE: CPT

## 2024-04-15 PROCEDURE — 3074F SYST BP LT 130 MM HG: CPT | Performed by: FAMILY MEDICINE

## 2024-04-15 PROCEDURE — 83036 HEMOGLOBIN GLYCOSYLATED A1C: CPT

## 2024-04-15 PROCEDURE — 80048 BASIC METABOLIC PNL TOTAL CA: CPT

## 2024-04-15 RX ORDER — LISINOPRIL 5 MG/1
5 TABLET ORAL DAILY
Qty: 90 TABLET | Refills: 3 | Status: SHIPPED | OUTPATIENT
Start: 2024-04-15

## 2024-04-15 RX ORDER — SEMAGLUTIDE 0.68 MG/ML
0.5 INJECTION, SOLUTION SUBCUTANEOUS WEEKLY
Qty: 3 ML | Refills: 3 | Status: SHIPPED | OUTPATIENT
Start: 2024-04-15

## 2024-04-15 RX ORDER — HYDROCHLOROTHIAZIDE 12.5 MG/1
12.5 TABLET ORAL DAILY
Qty: 90 TABLET | Refills: 3 | Status: SHIPPED | OUTPATIENT
Start: 2024-04-15

## 2024-04-15 NOTE — PROGRESS NOTES
Patient ID: Valeria Telles is a 63 year old female.    Diabetes  Pertinent negatives for diabetes include no chest pain.     Chief Complaint   Patient presents with    Diabetes     F/u    Weight Check       Last seen by me on 11/13/2023.    Last A1c value was 7.1% done 11/13/2023. Patient denies any chest pain, shortness breath, diaphoresis, dizziness, hypoglycemia, numbness or tingling in the legs. She takes metformin  mg and ozempic 0.5 mg for DM. States that the ozempic is making her constipated; she takes Miralax once a week with a little relief.  I discussed that she should take Miralax every other day. Pt sees Dr. Lorenz, ophthalmology. H/o cataract in both eyes but very mild and no surgery needed.     I reviewed her labs from January 2024 which were normal. Patient is taking levothyroxine 25 mcg for her thyroid. She has not been taking her Lexapro 20 mg for mood because she feels like she does not need it anymore. Pt takes atorvastatin 20 mg for cholesterol. She also takes lisinopril 5 mg and hydrochlorothiazide 12.5 for BP.     She reports thinning hair and notices extra strands of hair fall out in the shower. Pt also states that she has had tingling in her hands for at least one year; she has tried wearing a wrist brace but does not tolerate it and takes it off at night. States that the pain wakes her up a night. I provided a referral for an EMG.  She has no desire to have carpal tunnel surgery.    Patient states that her sister recently had a heart attack and her brother has stents in his heart; FHx of CAD. I discussed getting coronary calcium score test and provided a referral.  She states they are in their 60s.    She would like to consult about her grandson's rare genetic mutation called GRIN2B. States that her daughter is a carrier for this genetic mutation and she would like to get tested. I provided a referral to a genetic counselor.         Wt Readings from Last 6 Encounters:   04/15/24 161 lb    23 172 lb 6.4 oz   23 181 lb   23 182 lb   23 183 lb   22 192 lb       BMI Readings from Last 6 Encounters:   04/15/24 32.52 kg/m²   23 34.82 kg/m²   23 36.56 kg/m²   23 36.76 kg/m²   23 36.96 kg/m²   22 38.78 kg/m²       BP Readings from Last 6 Encounters:   04/15/24 108/70   24 112/72   23 111/74   23 114/74   23 109/76   23 130/80         Review of Systems   Respiratory:  Negative for shortness of breath.    Cardiovascular:  Negative for chest pain.   Gastrointestinal:  Positive for constipation.           Medical History:      Past Medical History:    Anesthesia complication    Diabetes (HCC)    DM2 (diabetes mellitus, type 2) (HCC)    Essential hypertension    High blood pressure    High cholesterol    Hyperlipidemia    Migraines    Obesity (BMI 30-39.9)    PONV (postoperative nausea and vomiting)       Past Surgical History:   Procedure Laterality Date          Colonoscopy N/A 2021    Procedure: COLONOSCOPY;  Surgeon: Juan Johnson MD;  Location: [facility] ENDOSCOPY    Dilation/curettage,diagnostic      Reduction left          Reduction right                Current Outpatient Medications   Medication Sig Dispense Refill    levothyroxine 25 MCG Oral Tab Take 1 tablet (25 mcg total) by mouth before breakfast. 90 tablet 1    semaglutide (OZEMPIC, 0.25 OR 0.5 MG/DOSE,) 2 MG/3ML Subcutaneous Solution Pen-injector Inject 0.5 mg into the skin once a week. 3 mL 3    metFORMIN  MG Oral Tablet 24 Hr Take 2 tablets (1,000 mg total) by mouth daily with breakfast AND 1 tablet (500 mg total) daily with dinner. 270 tablet 1    escitalopram 20 MG Oral Tab Take 1 tablet (20 mg total) by mouth daily. For mood 90 tablet 1    atorvastatin 20 MG Oral Tab Take 1 tablet (20 mg total) by mouth nightly. 90 tablet 3    hydroCHLOROthiazide 12.5 MG Oral Tab Take 1 tablet (12.5 mg total) by mouth daily. 90 tablet 3     lisinopril 5 MG Oral Tab Take 1 tablet (5 mg total) by mouth daily. 90 tablet 3    Insulin Pen Needle (BD PEN NEEDLE MAG U/F) 32G X 4 MM Does not apply Misc USE A NEW NEEDLE WITH EACH USE 1 Box 1    Blood Gluc Meter Disp-Strips Does not apply Device Needs 1 glucometer along with 100 test strips and 100 lancets with 11 refills each. Check sugars BID and prn. 1 Device 0     Allergies:  Allergies   Allergen Reactions    Iodine (Topical) RASH    Radiology Contrast Iodinated Dyes RASH        Physical Exam:       Physical Exam  Blood pressure 108/70, pulse 84, temperature 96.9 °F (36.1 °C), height 4' 11\" (1.499 m), weight 161 lb, last menstrual period 09/01/2014, not currently breastfeeding.      Physical Exam   Constitutional: Patient is oriented to person, place, and time. Patient appears well-developed and well-nourished. No distress.   Head: Normocephalic.   Eyes: Conjunctivae and EOM are normal.   Neck: Normal range of motion. No thyromegaly present.   Cardiovascular: Normal rate, regular rhythm and normal heart sounds.    Pulmonary/Chest: Effort normal and breath sounds normal. No respiratory distress.   Lymphadenopathy: Patient has no cervical adenopathy.  Neurological: Patient is alert and oriented to person, place, and time.   Skin: Skin is warm.   Psychiatry: Normal mood and affect.  Lower legs: No edema of the legs bilaterally. Pedal pulses are normal.   Wrists: Positive tinel's ENZO. Opposition strength is normal.  No hand atrophy.  Full range of motion of the fingers and wrist.  Bilateral barefoot skin diabetic exam is normal, visualized feet and the appearance is normal.  Bilateral monofilament/sensation of both feet is normal.  Pulsation pedal pulse exam of both lower legs/feet is normal as well.        Vitals reviewed.           Assessment/Plan:      Diagnoses and all orders for this visit:    Uncontrolled type 2 diabetes mellitus with hyperglycemia, without long-term current use of insulin (HCC)  -      Basic Metabolic Panel (8); Future  -     Hemoglobin A1C; Future  -     OPHTHALMOLOGY - INTERNAL  -     EMG  -     lisinopril 5 MG Oral Tab; Take 1 tablet (5 mg total) by mouth daily.    Obesity (BMI 30-39.9)  She is doing quite well with regard to her weight.  She is on Ozempic 0.5 mg weekly and tolerates it although it does give her constipation.  See patient instructions  Essential hypertension  -     lisinopril 5 MG Oral Tab; Take 1 tablet (5 mg total) by mouth daily.  -     hydroCHLOROthiazide 12.5 MG Oral Tab; Take 1 tablet (12.5 mg total) by mouth daily.    Age-related nuclear cataract of both eyes  -     OPHTHALMOLOGY - INTERNAL    Other constipation  See patient instructions  Thinning hair  -     Vitamin B12; Future  Will check TSH again  Acquired hypothyroidism  -     Assay, Thyroid Stim Hormone; Future    Paresthesia of both hands  -     EMG  I explained that we do need to do an EMG to make sure this is not severely damaging her median nerve.  She agrees  Family history of gene mutation  -     OP REFERRAL TO GENETIC COUNSELOR  Looked at this particular gene mutation.  Hypercholesterolemia with hypertriglyceridemia  Compliant with atorvastatin  See patient instructions as we also discussed the family issues with stent and heart disease.  Lets do a coronary calcium score as she has no angina but it would be worthwhile making sure that the arteries are clear.  She agrees to that plan.    Referrals (if applicable)  Orders Placed This Encounter   Procedures    OP REFERRAL TO GENETIC COUNSELOR     Patient to be seen for genetic counseling by Lianne Damon.    Her daughter is a carrier for GRIN2B genetic mutation.  Her son was born with this.     Referral Priority:   Routine     Referral Type:   E/M Services     Referred to Provider:   Lianne Damon     Requested Specialty:   Genetics     Number of Visits Requested:   3    OPHTHALMOLOGY - INTERNAL     Phone number [phone].     Referral Priority:    Routine     Referral Type:   OFFICE VISIT     Referred to Provider:   Gerardo Lorenz MD     Requested Specialty:   OPHTHALMOLOGY     Number of Visits Requested:   2       Follow up if symptoms persist.  Take medicine (if given) as prescribed.  Approach to treatment discussed and patient/family member understands and agrees to plan.     No follow-ups on file.    Patient Instructions                                    CORONARY CALCIUM HEART SCORE SCAN    You may be interested in a coronary calcium heart scan.  This test used to be $1000 years ago but now is $100 at the hospital with payment at the time of the test.  Insurance does not cover this test but you get the results immediately and it will tell you how much cholesterol you have in your arteries of your heart.  I think it is a very worthwhile test and I did it myself.      They will send me the results as well so we could discuss further.  Depending on how much cholesterol is in the heart, this could dictate if we put you on cholesterol medication or not.  If you do want to do this test you do not need my permission.  Just call [phone] and tell them you want a coronary calcium heart scan.      ========================================================================    Try MiraLAX 1 scoop, Monday, Wednesday, Saturday and see if that helps with constipation.      Sekou Albert    4/15/2024    By signing my name below, I, Sekou Albert,  attest that this documentation has been prepared under the direction and in the presence of Ralph Monterroso DO.   Electronically Signed: Sekou Albert, 4/15/2024, 8:59 AM.      I, Ralph Monterroso DO,  personally performed the services described in this documentation. All medical record entries made by the scribe were at my direction and in my presence.  I have reviewed the chart and discharge instructions (if applicable) and agree that the record reflects my personal performance and is accurate and complete.  Ralph  DO Loc, 4/15/2024, 9:37 AM

## 2024-04-15 NOTE — PATIENT INSTRUCTIONS
CORONARY CALCIUM HEART SCORE SCAN    You may be interested in a coronary calcium heart scan.  This test used to be $1000 years ago but now is $100 at the hospital with payment at the time of the test.  Insurance does not cover this test but you get the results immediately and it will tell you how much cholesterol you have in your arteries of your heart.  I think it is a very worthwhile test and I did it myself.      They will send me the results as well so we could discuss further.  Depending on how much cholesterol is in the heart, this could dictate if we put you on cholesterol medication or not.  If you do want to do this test you do not need my permission.  Just call [phone] and tell them you want a coronary calcium heart scan.      ========================================================================    Try MiraLAX 1 scoop, Monday, Wednesday, Saturday and see if that helps with constipation.

## 2024-04-15 NOTE — TELEPHONE ENCOUNTER
Refill passed per [facility] protocol.  Requested Prescriptions   Pending Prescriptions Disp Refills    OZEMPIC, 0.25 OR 0.5 MG/DOSE, 2 MG/3ML Subcutaneous Solution Pen-injector [Pharmacy Med Name: OZEMPIC 0.25-0.5 MG/DOSE(2 MG/3 ML)] 3 mL 3     Sig: DIAL AND INJECT UNDER THE SKIN 0.5 MG WEEKLY       Diabetes Medication Protocol Passed - 4/13/2024  5:34 AM        Passed - Last A1C < 7.5 and within past 6 months     Lab Results   Component Value Date    A1C 7.1 (H) 11/13/2023             Passed - In person appointment or virtual visit in the past 6 mos or appointment in next 3 mos     Recent Outpatient Visits              Today Uncontrolled type 2 diabetes mellitus with hyperglycemia, without long-term current use of insulin ([facility])    [facility] [place] [address],     Office Visit    5 months ago Uncontrolled type 2 diabetes mellitus with hyperglycemia, without long-term current use of insulin ([facility])    [facility] [address],     Office Visit    5 months ago Anxiety    [facility], [address],     Telemedicine    6 months ago Anxiety    [facility] [place]     [facility]med[facility]    7 months ago COVID    [facility], [facility], [facility] Juliann Rivera PA-C    Office Visit                      Passed - Microalbumin procedure in past 12 months or taking ACE/ARB        Passed - EGFRCR or GFRNAA > 50     GFR Evaluation  EGFRCR: 83 , resulted on 1/18/2024          Passed - GFR in the past 12 months           Recent Outpatient Visits              Today Uncontrolled type 2 diabetes mellitus with hyperglycemia, without long-term current use of insulin ([facility])    [facility] [address],     Office Visit    5 months ago Uncontrolled type 2 diabetes mellitus with  hyperglycemia, without long-term current use of insulin (HCC)    [facility], [address], Ralph Peralta,     Office Visit    5 months ago Anxiety    [facility], [address], Ralph Peralta,     Telemedicine    6 months ago Anxiety    [facility], [address], Ralph Peralta,     Telemedicine    7 months ago COVID    [facility], Walk-In Clinic, [facility] Juliann Rivera PA-C    Office Visit

## 2024-04-17 DIAGNOSIS — L65.9 THINNING HAIR: Primary | ICD-10-CM

## 2024-05-03 ENCOUNTER — APPOINTMENT (OUTPATIENT)
Dept: HEMATOLOGY/ONCOLOGY | Facility: HOSPITAL | Age: 64
End: 2024-05-03
Attending: GENETIC COUNSELOR, MS
Payer: COMMERCIAL

## 2024-05-03 ENCOUNTER — APPOINTMENT (OUTPATIENT)
Dept: GENETICS | Facility: HOSPITAL | Age: 64
End: 2024-05-03
Attending: GENETIC COUNSELOR, MS
Payer: COMMERCIAL

## 2024-05-08 ENCOUNTER — TELEPHONE (OUTPATIENT)
Dept: GASTROENTEROLOGY | Facility: CLINIC | Age: 64
End: 2024-05-08

## 2024-05-08 NOTE — TELEPHONE ENCOUNTER
Patient outreach message received:    Recall colon in 3 years per Dr. Johnson.  Last done:8/24/21  Next due: 8/24/24    Recall reminder letter mailed out to patient.

## 2024-08-20 DIAGNOSIS — E11.65 UNCONTROLLED TYPE 2 DIABETES MELLITUS WITH HYPERGLYCEMIA, WITHOUT LONG-TERM CURRENT USE OF INSULIN (HCC): ICD-10-CM

## 2024-08-21 RX ORDER — SEMAGLUTIDE 0.68 MG/ML
0.5 INJECTION, SOLUTION SUBCUTANEOUS WEEKLY
Qty: 3 ML | Refills: 3 | Status: SHIPPED | OUTPATIENT
Start: 2024-08-21 | End: 2024-10-11

## 2024-08-21 NOTE — TELEPHONE ENCOUNTER
Refill passed per [facility] protocol.     Requested Prescriptions   Pending Prescriptions Disp Refills    OZEMPIC, 0.25 OR 0.5 MG/DOSE, 2 MG/3ML Subcutaneous Solution Pen-injector [Pharmacy Med Name: OZEMPIC 0.25-0.5 MG/DOSE PEN] 3 mL 3     Sig: DIAL AND INJECT UNDER THE SKIN 0.5 MG WEEKLY       Diabetes Medication Protocol Passed - 8/20/2024  4:53 PM        Passed - Last A1C < 7.5 and within past 6 months     Lab Results   Component Value Date    A1C 5.8 (H) 04/15/2024             Passed - In person appointment or virtual visit in the past 6 mos or appointment in next 3 mos     Recent Outpatient Visits              4 months ago Uncontrolled type 2 diabetes mellitus with hyperglycemia, without long-term current use of insulin ([facility])    [facility] [place] [place]     Office Visit    9 months ago Uncontrolled type 2 diabetes mellitus with hyperglycemia, without long-term current use of insulin ([facility])    [facility]    [facility]    10 months ago [facility] Ralph Monterroso,     Telemedicine    11 months ago [facility], Walk[facility]In Clinic, [facility] Juliann Rivera PA-C    Office Visit                      Passed - Microalbumin procedure in past 12 months or taking ACE/ARB        Passed - EGFRCR or GFRNAA > 50     GFR Evaluation  EGFRCR: 81 , resulted on 4/15/2024          Passed - GFR in the past 12 months             @[facility] IslandFVPRINTGRP@      @[facility] HospitalVPRNTGRP@

## 2024-08-29 DIAGNOSIS — I10 ESSENTIAL HYPERTENSION: ICD-10-CM

## 2024-08-30 RX ORDER — HYDROCHLOROTHIAZIDE 12.5 MG/1
12.5 TABLET ORAL DAILY
Qty: 90 TABLET | Refills: 3 | OUTPATIENT
Start: 2024-08-30

## 2024-09-07 DIAGNOSIS — E03.9 ACQUIRED HYPOTHYROIDISM: ICD-10-CM

## 2024-09-10 RX ORDER — LEVOTHYROXINE SODIUM 25 UG/1
25 TABLET ORAL
Qty: 90 TABLET | Refills: 3 | Status: SHIPPED | OUTPATIENT
Start: 2024-09-10

## 2024-09-10 NOTE — TELEPHONE ENCOUNTER
Refill Per Protocol     Requested Prescriptions   Pending Prescriptions Disp Refills    LEVOTHYROXINE 25 MCG Oral Tab [Pharmacy Med Name: LEVOTHYROXINE 25 MCG TABLET] 30 tablet 0     Sig: TAKE 1 TABLET BY MOUTH DAILY BEFORE BREAKFAST       Thyroid Medication Protocol Passed - 9/7/2024  5:35 AM        Passed - TSH in past 12 months        Passed - Last TSH value is normal     Lab Results   Component Value Date    TSH 2.001 04/15/2024    TSHT4 3.89 12/14/2017                 Passed - In person appointment or virtual visit in the past 12 mos or appointment in next 3 mos     Recent Outpatient Visits              4 months ago Uncontrolled type 2 diabetes mellitus with hyperglycemia, without long-term current use of insulin [facility]    10 months ago Uncontrolled type 2 diabetes mellitus with hyperglycemia, without long-term current use of insulin ([facility])    [facility]    [facility]     [facility]    11 months ago Anxiety    [facility], Ralph Peralta,     Telemedicine    11 months ago [facility], [place] Walk[facility]In Clinic, [facility] [place] Juliann Rivera PA-C    Office Visit                               Recent Outpatient Visits              4 months ago Uncontrolled type 2 diabetes mellitus with hyperglycemia, without long-term current use of insulin ([facility])    [facility]     [facility]    10 months ago Uncontrolled type 2 diabetes mellitus with hyperglycemia, without long-term current use of insulin ([facility])    [facility]     [facility]    10 months ago [facility]  [place], Ralph Peralta,     Telemedicine    11 months ago Anxiety    [facility], [address], Ralph Peralta DO    Telemedicine    11 months ago COVID    [facility] [place] [facility]    Office Visit

## 2024-09-19 DIAGNOSIS — E11.65 UNCONTROLLED TYPE 2 DIABETES MELLITUS WITH HYPERGLYCEMIA, WITHOUT LONG-TERM CURRENT USE OF INSULIN (HCC): ICD-10-CM

## 2024-09-19 DIAGNOSIS — I10 ESSENTIAL HYPERTENSION: ICD-10-CM

## 2024-09-22 RX ORDER — LISINOPRIL 5 MG/1
5 TABLET ORAL DAILY
Qty: 90 TABLET | Refills: 3 | Status: SHIPPED | OUTPATIENT
Start: 2024-09-22

## 2024-09-22 NOTE — TELEPHONE ENCOUNTER
Refill passed per [facility] protocol.    Requested Prescriptions   Pending Prescriptions Disp Refills    LISINOPRIL 5 MG Oral Tab [Pharmacy Med Name: LISINOPRIL 5 MG TABLET] 90 tablet 3     Sig: TAKE 1 TABLET BY MOUTH DAILY       Hypertension Medications Protocol Passed - 9/19/2024  5:04 AM        Passed - CMP or BMP in past 12 months        Passed - Last BP reading less than 140/90     BP Readings from Last 1 Encounters:   04/15/24 108/70               Passed - In person appointment or virtual visit in the past 12 mos or appointment in next 3 mos     Recent Outpatient Visits              5 months ago Uncontrolled type 2 diabetes mellitus with hyperglycemia, without long-term current use of insulin ([facility])    [facility] [address],     Office Visit    10 months ago Uncontrolled type 2 diabetes mellitus with hyperglycemia, without long-term current use of insulin ([facility])    [facility]    Office Visit    10 months ago Anxiety    [facility] [place],     Telemedicine    11 months ago Anxiety    [facility], Ralph Peralta,     Telemedicine    1 year ago [facility], [facility]In Clinic, [facility] Juliann Rivera PA-C    Office Visit                      Passed - EGFRCR or GFRNAA > 50     GFR Evaluation  EGFRCR: 81 , resulted on 4/15/2024                   Recent Outpatient Visits              5 months ago Uncontrolled type 2 diabetes mellitus with hyperglycemia, without long-term current use of insulin ([facility])    [facility]     [facility]    10 months ago Uncontrolled type 2 diabetes mellitus with hyperglycemia, without long-term current use of insulin ([facility])    [facility] [address],      Office Visit    10 months ago Anxiety    [facility], [address], Ralph Peralta,     Telemedicine    11 months ago Anxiety    [facility], [address], Ralph Peralta,     Telemedicine    1 year ago COVID    [facility], [facility]In Clinic, [facility] Juliann Rivera PA-C    Office Visit

## 2024-10-10 ENCOUNTER — OFFICE VISIT (OUTPATIENT)
Dept: FAMILY MEDICINE CLINIC | Facility: CLINIC | Age: 64
End: 2024-10-10
Payer: COMMERCIAL

## 2024-10-10 ENCOUNTER — LAB ENCOUNTER (OUTPATIENT)
Dept: LAB | Age: 64
End: 2024-10-10
Attending: FAMILY MEDICINE
Payer: COMMERCIAL

## 2024-10-10 VITALS
DIASTOLIC BLOOD PRESSURE: 72 MMHG | HEIGHT: 59 IN | SYSTOLIC BLOOD PRESSURE: 107 MMHG | HEART RATE: 74 BPM | TEMPERATURE: 97 F | BODY MASS INDEX: 31.21 KG/M2 | WEIGHT: 154.81 LBS

## 2024-10-10 DIAGNOSIS — R05.1 ACUTE COUGH: Primary | ICD-10-CM

## 2024-10-10 DIAGNOSIS — R42 DIZZINESS: ICD-10-CM

## 2024-10-10 DIAGNOSIS — H65.91 MEE (MIDDLE EAR EFFUSION), RIGHT: ICD-10-CM

## 2024-10-10 DIAGNOSIS — E11.9 CONTROLLED TYPE 2 DIABETES MELLITUS WITHOUT COMPLICATION, WITHOUT LONG-TERM CURRENT USE OF INSULIN (HCC): ICD-10-CM

## 2024-10-10 DIAGNOSIS — R29.898 WEAKNESS OF LEFT HAND: ICD-10-CM

## 2024-10-10 DIAGNOSIS — R20.2 PARESTHESIA OF BOTH HANDS: ICD-10-CM

## 2024-10-10 DIAGNOSIS — Z23 NEED FOR VACCINATION: ICD-10-CM

## 2024-10-10 LAB
ANION GAP SERPL CALC-SCNC: 8 MMOL/L (ref 0–18)
BASOPHILS # BLD AUTO: 0.07 X10(3) UL (ref 0–0.2)
BASOPHILS NFR BLD AUTO: 0.9 %
BUN BLD-MCNC: 10 MG/DL (ref 9–23)
BUN/CREAT SERPL: 12.8 (ref 10–20)
CALCIUM BLD-MCNC: 9.9 MG/DL (ref 8.7–10.4)
CHLORIDE SERPL-SCNC: 104 MMOL/L (ref 98–112)
CO2 SERPL-SCNC: 31 MMOL/L (ref 21–32)
CREAT BLD-MCNC: 0.78 MG/DL
DEPRECATED RDW RBC AUTO: 43.3 FL (ref 35.1–46.3)
EGFRCR SERPLBLD CKD-EPI 2021: 85 ML/MIN/1.73M2 (ref 60–?)
EOSINOPHIL # BLD AUTO: 0.35 X10(3) UL (ref 0–0.7)
EOSINOPHIL NFR BLD AUTO: 4.3 %
ERYTHROCYTE [DISTWIDTH] IN BLOOD BY AUTOMATED COUNT: 13.2 % (ref 11–15)
EST. AVERAGE GLUCOSE BLD GHB EST-MCNC: 111 MG/DL (ref 68–126)
FASTING STATUS PATIENT QL REPORTED: NO
GLUCOSE BLD-MCNC: 79 MG/DL (ref 70–99)
HBA1C MFR BLD: 5.5 % (ref ?–5.7)
HCT VFR BLD AUTO: 41.9 %
HGB BLD-MCNC: 14 G/DL
IMM GRANULOCYTES # BLD AUTO: 0.02 X10(3) UL (ref 0–1)
IMM GRANULOCYTES NFR BLD: 0.2 %
LYMPHOCYTES # BLD AUTO: 2.5 X10(3) UL (ref 1–4)
LYMPHOCYTES NFR BLD AUTO: 31.1 %
MCH RBC QN AUTO: 29.7 PG (ref 26–34)
MCHC RBC AUTO-ENTMCNC: 33.4 G/DL (ref 31–37)
MCV RBC AUTO: 88.8 FL
MONOCYTES # BLD AUTO: 0.76 X10(3) UL (ref 0.1–1)
MONOCYTES NFR BLD AUTO: 9.4 %
NEUTROPHILS # BLD AUTO: 4.35 X10 (3) UL (ref 1.5–7.7)
NEUTROPHILS # BLD AUTO: 4.35 X10(3) UL (ref 1.5–7.7)
NEUTROPHILS NFR BLD AUTO: 54.1 %
OSMOLALITY SERPL CALC.SUM OF ELEC: 294 MOSM/KG (ref 275–295)
PLATELET # BLD AUTO: 295 10(3)UL (ref 150–450)
POTASSIUM SERPL-SCNC: 4.1 MMOL/L (ref 3.5–5.1)
RBC # BLD AUTO: 4.72 X10(6)UL
SODIUM SERPL-SCNC: 143 MMOL/L (ref 136–145)
WBC # BLD AUTO: 8.1 X10(3) UL (ref 4–11)

## 2024-10-10 PROCEDURE — 83036 HEMOGLOBIN GLYCOSYLATED A1C: CPT

## 2024-10-10 PROCEDURE — 80048 BASIC METABOLIC PNL TOTAL CA: CPT

## 2024-10-10 PROCEDURE — 85025 COMPLETE CBC W/AUTO DIFF WBC: CPT

## 2024-10-10 PROCEDURE — 36415 COLL VENOUS BLD VENIPUNCTURE: CPT

## 2024-10-10 RX ORDER — DEXAMETHASONE 4 MG/1
TABLET ORAL
Qty: 5 TABLET | Refills: 0 | Status: SHIPPED | OUTPATIENT
Start: 2024-10-10

## 2024-10-10 NOTE — PROGRESS NOTES
Patient ID: Valeria Telles is a 64 year old female.    Cough  Associated symptoms include postnasal drip. Pertinent negatives include no chest pain, fever, rhinorrhea or shortness of breath.   Hand Pain   Pertinent negatives include no fever or numbness.   Dizziness  Associated symptoms include congestion, coughing and weakness. Pertinent negatives include no chest pain, diaphoresis, fever or numbness.     Chief Complaint   Patient presents with    Cough     X 6 days    Hand Pain     Left hand pain / numbness     Dizziness     X 6 days     Last seen on 04/14/2024.      Pt will receive a flu shot today.     She c/o a cough starting six days ago. Pt states her daughter was sick first and then she herself got sick. On Saturday she started to feel imbalance while getting out of bed. It was getting better but today she felt dizzy while staring at her computer; episodes last a few seconds. Denies SOB and chest pain. Denies tinnitus. She denies coughing up phlegm or a fever, She is experiencing rhinorrhea but has been for many years. Pt is using Mucinex with some relief.  She is experiencing postnasal drip but no nasal congestion.    Pt c/o numbness in hands bilaterally but worse in the left where she experiences weakness; can't make a fist with her left hand. She didn't complete an EMG test as recommended last visit; so I reprinted the order for her.      She also wishes to consult if she needs to remain on Ozempic. Last A1c value was 5.8% done 4/15/2024. Patient denies any chest pain, shortness breath, diaphoresis, dizziness, hypoglycemia, numbness or tingling in the legs. I discussed with her.  She states she is lost quite a bit of weight that she is very happy with and wonders if she can keep it off without the medication due to her lifestyle changes.      Wt Readings from Last 6 Encounters:   10/10/24 154 lb 12.8 oz   04/15/24 161 lb   11/13/23 172 lb 6.4 oz   09/17/23 181 lb   08/07/23 182 lb   04/17/23 183 lb        BMI Readings from Last 6 Encounters:   10/10/24 31.27 kg/m²   04/15/24 32.52 kg/m²   23 34.82 kg/m²   23 36.56 kg/m²   23 36.76 kg/m²   23 36.96 kg/m²       BP Readings from Last 6 Encounters:   10/10/24 107/72   04/15/24 108/70   24 112/72   23 111/74   23 114/74   23 109/76         Review of Systems   Constitutional:  Negative for diaphoresis and fever.   HENT:  Positive for congestion and postnasal drip. Negative for rhinorrhea and tinnitus.    Respiratory:  Positive for cough. Negative for shortness of breath.    Cardiovascular:  Negative for chest pain.   Neurological:  Positive for dizziness and weakness. Negative for numbness.           Medical History:      Past Medical History:    Anesthesia complication    Diabetes (HCC)    DM2 (diabetes mellitus, type 2) (HCC)    Essential hypertension    High blood pressure    High cholesterol    Hyperlipidemia    Migraines    Obesity (BMI 30-39.9)    PONV (postoperative nausea and vomiting)       Past Surgical History:   Procedure Laterality Date          Colonoscopy N/A 2021    Procedure: COLONOSCOPY;  Surgeon: Juan Johnson MD;  Location: [facility] ENDOSCOPY    Dilation/curettage,diagnostic      Reduction left          Reduction right                Current Outpatient Medications   Medication Sig Dispense Refill    lisinopril 5 MG Oral Tab Take 1 tablet (5 mg total) by mouth daily. 90 tablet 3    levothyroxine 25 MCG Oral Tab Take 1 tablet (25 mcg total) by mouth before breakfast. 90 tablet 3    semaglutide (OZEMPIC, 0.25 OR 0.5 MG/DOSE,) 2 MG/3ML Subcutaneous Solution Pen-injector Inject 0.5 mg into the skin once a week. 3 mL 3    hydroCHLOROthiazide 12.5 MG Oral Tab Take 1 tablet (12.5 mg total) by mouth daily. 90 tablet 3    atorvastatin 20 MG Oral Tab Take 1 tablet (20 mg total) by mouth nightly. 90 tablet 3    Insulin Pen Needle (BD PEN NEEDLE MAG U/F) 32G X 4 MM Does not apply  Misc USE A NEW NEEDLE WITH EACH USE 1 Box 1    Blood Gluc Meter Disp-Strips Does not apply Device Needs 1 glucometer along with 100 test strips and 100 lancets with 11 refills each. Check sugars BID and prn. 1 Device 0     Allergies:Allergies[1]     Physical Exam:       Physical Exam  Blood pressure 107/72, pulse 74, temperature 97.4 °F (36.3 °C), temperature source Temporal, height 4' 11\" (1.499 m), weight 154 lb 12.8 oz, last menstrual period 09/01/2014, not currently breastfeeding.      Physical Exam   Constitutional: Patient is oriented to person, place, and time. Patient appears well-developed and well-nourished. No distress.   Head: Normocephalic.   Right Ear: External ear and ear canal normal. Retracted tympanic membrane bilaterally with fluid behind the right ear.   Left Ear: External ear and ear canal normal.   Nose: No rhinorrhea. Pale boggy nasal mucosa.  Throat: Oropharynx is clear without exudate Tonsils removed surgically.   Eyes: Conjunctivae and EOM are normal.   Neck: Normal range of motion. No thyromegaly present.   Cardiovascular: Normal rate, regular rhythm and normal heart sounds.    Pulmonary/Chest: Effort normal and breath sounds normal. No respiratory distress.   Lymphadenopathy: Patient has no cervical adenopathy.  Neurological: Patient is alert and oriented to person, place, and time.   Skin: Skin is warm.   Psychiatry: Normal mood and affect.    Vitals reviewed.           Assessment/Plan:      Diagnoses and all orders for this visit:    Acute cough  -     dexamethasone (DECADRON) 4 MG tablet; 1 by mouth each day for 5 days.  Best taken at breakfast or lunch.  -     amoxicillin clavulanate 875-125 MG Oral Tab; Take 1 tablet by mouth 2 (two) times daily for 10 days.  Start Augmentin and Decadron.  She understands the plan.  Dizziness  -     dexamethasone (DECADRON) 4 MG tablet; 1 by mouth each day for 5 days.  Best taken at breakfast or lunch.  -     amoxicillin clavulanate 875-125 MG Oral  Tab; Take 1 tablet by mouth 2 (two) times daily for 10 days.  -     CBC With Differential With Platelet; Future  Possibly due to the fluid behind the right TM.  Need for vaccination  -     INFLUENZA VACCINE, TRI, PRESERV FREE, 0.5 ML    Paresthesia of both hands  Reprinted out the EMG order.  Weakness of left hand  As above  ISAAC (middle ear effusion), right  -     dexamethasone (DECADRON) 4 MG tablet; 1 by mouth each day for 5 days.  Best taken at breakfast or lunch.  -     amoxicillin clavulanate 875-125 MG Oral Tab; Take 1 tablet by mouth 2 (two) times daily for 10 days.    Controlled type 2 diabetes mellitus without complication, without long-term current use of insulin (HCC)  -     Hemoglobin A1C; Future  -     Basic Metabolic Panel (8); Future  While here we will do basic labs and decide on keeping Ozempic or not.      Referrals (if applicable)  No orders of the defined types were placed in this encounter.      Follow up if symptoms persist.  Take medicine (if given) as prescribed.  Approach to treatment discussed and patient/family member understands and agrees to plan.     No follow-ups on file.    There are no Patient Instructions on file for this visit.    Teresa Arellano    10/10/2024    By signing my name below, Teresa HENRIQUEZ,  attest that this documentation has been prepared under the direction and in the presence of Ralph Monterroso DO.   Electronically Signed: Teresa Arellano, 10/10/2024, 2:18 PM.    IRalph DO,  personally performed the services described in this documentation. All medical record entries made by the scribe were at my direction and in my presence.  I have reviewed the chart and discharge instructions (if applicable) and agree that the record reflects my personal performance and is accurate and complete.  Ralph Monterroso DO, 10/10/2024, 3:17 PM                  [1]   Allergies  Allergen Reactions    Iodine (Topical) RASH    Radiology Contrast Iodinated Dyes RASH

## 2024-10-14 ENCOUNTER — PROCEDURE VISIT (OUTPATIENT)
Dept: PHYSICAL MEDICINE AND REHAB | Facility: CLINIC | Age: 64
End: 2024-10-14
Payer: COMMERCIAL

## 2024-10-14 DIAGNOSIS — G56.03 BILATERAL CARPAL TUNNEL SYNDROME: Primary | ICD-10-CM

## 2024-10-14 NOTE — PROGRESS NOTES
[facility]  Electromyography Consultation      History of Present Illness:    Dear Dr. Monterroso,  Thank you for the opportunity to see Valeria Elfego for electrodiagnostic consultation today. As you know the patient is a 64 year old female with a chief complaint of bilateral hand numbness and tingling.  She is diabetic.  Symptoms have been present for nearly 2 years.  She cannot tolerate wrist braces.  The pain wakes her up at night.  There is suspicion for carpal tunnel syndrome.    PAST MEDICAL HISTORY:  Past Medical History:    Anesthesia complication    Diabetes (HCC)    DM2 (diabetes mellitus, type 2) (HCC)    Essential hypertension    High blood pressure    High cholesterol    Hyperlipidemia    Migraines    Obesity (BMI 30-39.9)    PONV (postoperative nausea and vomiting)       SURGICAL HISTORY:  Past Surgical History:   Procedure Laterality Date          Colonoscopy N/A 2021    Procedure: COLONOSCOPY;  Surgeon: Juan Johnson MD;  Location: [facility] ENDOSCOPY    Dilation/curettage,diagnostic      Reduction left      2000    Reduction right             SOCIAL HISTORY:   Social History     Occupational History    Not on file   Tobacco Use    Smoking status: Never    Smokeless tobacco: Never   Vaping Use    Vaping status: Never Used   Substance and Sexual Activity    Alcohol use: Yes     Comment: Socially    Drug use: No    Sexual activity: Not on file       FAMILY HISTORY:   Family History   Problem Relation Age of Onset    Cancer Mother         Cancer - liver    Other (Other) Father         alzheimers    Diabetes Neg     Glaucoma Neg        CURRENT MEDICATIONS:   Current Outpatient Medications   Medication Sig Dispense Refill    dexamethasone (DECADRON) 4 MG tablet 1 by mouth each day for 5 days.  Best taken at breakfast or lunch. 5 tablet 0    amoxicillin clavulanate 875-125 MG Oral Tab Take 1 tablet by mouth 2 (two) times daily for 10 days.  20 tablet 0    lisinopril 5 MG Oral Tab Take 1 tablet (5 mg total) by mouth daily. 90 tablet 3    levothyroxine 25 MCG Oral Tab Take 1 tablet (25 mcg total) by mouth before breakfast. 90 tablet 3    hydroCHLOROthiazide 12.5 MG Oral Tab Take 1 tablet (12.5 mg total) by mouth daily. 90 tablet 3    atorvastatin 20 MG Oral Tab Take 1 tablet (20 mg total) by mouth nightly. 90 tablet 3    Insulin Pen Needle (BD PEN NEEDLE MAG U/F) 32G X 4 MM Does not apply Misc USE A NEW NEEDLE WITH EACH USE 1 Box 1    Blood Gluc Meter Disp-Strips Does not apply Device Needs 1 glucometer along with 100 test strips and 100 lancets with 11 refills each. Check sugars BID and prn. 1 Device 0       PHYSICAL EXAM:   LMP 09/01/2014 (Approximate)     There is no height or weight on file to calculate BMI.      General: No immediate distress   Extremities: peripheral pulses intact, no lower extremity edema bilaterally   Skin: No lesions noted.   Neuro:   Strength: Upper extremities have 5/5 strength  Muscle bulk: No atrophy  Sensation: Normal upper extremities  Reflexes: Diminished upper extremities  Tinel's sign: Positive bilaterally    EMG/NCV  Sensory Nerve Conduction Study       Nerve / Sites Peak Lat Amp Segments Distance    ms µV  cm   R Median - Dig III (Antidromic)      Wrist 5.3 7.2 Wrist - Dig III 14      Palm 2.2 12.2 Palm - Dig III 7   L Median - Dig III (Antidromic)      Wrist NR NR Wrist - Dig III 14      Palm NR NR Palm - Dig III 7   R Ulnar - Dig V (Antidromic)      Wrist 3.2 27.2 Wrist - Dig V 14   L Ulnar - Dig V (Antidromic)      Wrist 3.2 34.4 Wrist - Dig V 14       Motor Nerve Conduction Study       Nerve / Sites Latency Amplitude Rel Amp Dur. Dur. Segments Distance Velocity Area Area    ms mV % ms %  cm m/s mVms %   R Median - APB      Wrist 7.6 4.2  6.8 100 Wrist - APB 8  16.4 100      Elbow 11.5 4.4 104 7.1 104 Elbow - Wrist 19 49 16.7 102   L Median - APB      Wrist 7.0 6.2  6.9 100 Wrist - APB 8  22.5 100      Elbow 10.6  5.9 95.7 7.0 102 Elbow - Wrist 18 49 21.4 95   R Ulnar - ADM      Wrist 2.7 13.0 100 6.5 100 Wrist - ADM 8  34.4 100      B.Elbow 6.1 12.3 94.8 6.7 102 B.Elbow - Wrist 18 54 34.0 98.6   L Ulnar - ADM      Wrist 2.6 12.4 100 5.4 100 Wrist - ADM 8  35.7 100       EMG Summary Table     Spontaneous MUAP Recruitment   Muscle IA Fib PSW Fasc Comments Amp Dur. PPP Pattern   R. Deltoid N None None None None N N N N   R. Triceps brachii N None None None None N N N N   R. Biceps brachii N None None None None N N N N   R. Flexor carpi radialis N None None None None N N N N   R. First dorsal interosseous N None None None None N N N N   R. Abductor pollicis brevis N None None None None N 1+ N Reduced   L. Deltoid N None None None None N N N N   L. Triceps brachii N None None None None N N N N   L. Biceps brachii N None None None None N N N N   L. Flexor carpi radialis N None None None None N N N N   L. First dorsal interosseous N None None None None N N N N   L. Abductor pollicis brevis N None None None None N 1+ N Reduced                       Findings: Extremities were warmed with hot packs for 15 minutes prior to testing and skin temperature maintained above 32 C.  Sensory nerve conduction studies revealed an absent left median response.  The right median response had a prolonged latency and small amplitudes with conduction delay across the transverse carpal ligament.  Ulnar responses were normal and symmetric bilaterally.  Motor nerve conduction studies revealed bilaterally prolonged median distal latencies and slow conduction velocities.  Amplitudes are relatively low compared to the ipsilateral ulnar amplitudes.  The ulnar motor studies were normal and symmetric bilaterally.  Needle EMG revealed abnormal motor units in the abductor pollicis brevis bilaterally.  All other muscles tested were normal.  Please see the EMG summary table for list of muscles tested.  Impression:  1.  Abnormal study.  2.  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  On the right there is sensorimotor demyelination and conduction block across the transverse carpal ligament.  There is also sensory axon loss.  On the left there is severe sensory axon loss along with motor fiber demyelination.  There is chronic axon loss bilaterally.  3.  No electrodiagnostic evidence of cervical radiculopathy bilaterally.    ASSESSMENT AND PLAN:  1. Bilateral carpal tunnel syndrome  The patient has clear clinical and electrodiagnostic evidence consistent with carpal tunnel syndrome.  Given the fact that the symptoms are chronic and the patient has failed nonoperative treatment, consideration of operative intervention is appropriate.        Thank you for the opportunity to participate in the care of this patient.  Sincerely,    Kenny Griffith M.D.  Diplomate American Board of Physical Medicine and Rehabilitation

## 2024-10-15 ENCOUNTER — TELEPHONE (OUTPATIENT)
Dept: FAMILY MEDICINE CLINIC | Facility: CLINIC | Age: 64
End: 2024-10-15

## 2024-10-15 DIAGNOSIS — R20.2 PARESTHESIA OF BOTH HANDS: Primary | ICD-10-CM

## 2024-10-15 DIAGNOSIS — G56.03 BILATERAL CARPAL TUNNEL SYNDROME: ICD-10-CM

## 2024-10-15 NOTE — TELEPHONE ENCOUNTER
Message  Received: Today  Ralph Monterroso DO  P Em Rn Triage         Previous Messages    Routed Note    Author: Ralph Monterroso DO Service: -- Author Type: Physician   Filed: 10/15/2024  7:59 AM Encounter Date: 10/14/2024 Status: Signed   : Ralph Monterroso DO (Physician)   Please inform her that her EMG/NCV by Dr. Griffith shows severe carpal tunnel syndrome on the left and moderate on the right.  I am going to do a referral to the hand specialist as she will most likely need surgery done. Call [phone] for Dr. Harrison.           Procedure Visit for Emg/ncv  10/14/2024  Kenny Griffith MD - [facility]  Diagnosis    Bilateral Carpal Tunnel Syndrome (Primary)  Orders Signed This Visit    None  Orders Pended This Visit    None  Progress Notes    Kenny Griffith MD at 10/14/2024  9:13 PM    Status: Signed   Expand All Collapse All  [facility]  Electromyography Consultation        History of Present Illness:     Dear Dr. Monterroso,  Thank you for the opportunity to see Valeria Telles for electrodiagnostic consultation today. As you know the patient is a 64 year old female with a chief complaint of bilateral hand numbness and tingling.  She is diabetic.  Symptoms have been present for nearly 2 years.  She cannot tolerate wrist braces.  The pain wakes her up at night.  There is suspicion for carpal tunnel syndrome.     PAST MEDICAL HISTORY:  Past Medical History       Past Medical History:    Anesthesia complication    Diabetes (HCC)    DM2 (diabetes mellitus, type 2) (HCC)    Essential hypertension    High blood pressure    High cholesterol    Hyperlipidemia    Migraines    Obesity (BMI 30-39.9)    PONV (postoperative nausea and vomiting)            SURGICAL HISTORY:  Past Surgical History         Past Surgical History:   Procedure Laterality Date           Colonoscopy N/A 2021     Procedure: COLONOSCOPY;   Surgeon: Juan Johnson MD;  Location: [facility] ENDOSCOPY    Dilation/curettage,diagnostic        Reduction left         2000    Reduction right         2000            SOCIAL HISTORY:   Social History            Occupational History    Not on file   Tobacco Use    Smoking status: Never    Smokeless tobacco: Never   Vaping Use    Vaping status: Never Used   Substance and Sexual Activity    Alcohol use: Yes       Comment: Socially    Drug use: No    Sexual activity: Not on file         FAMILY HISTORY:   Family History         Family History   Problem Relation Age of Onset    Cancer Mother           Cancer - liver    Other (Other) Father           alzheimers    Diabetes Neg      Glaucoma Neg              CURRENT MEDICATIONS:   Current Medications          Current Outpatient Medications   Medication Sig Dispense Refill    dexamethasone (DECADRON) 4 MG tablet 1 by mouth each day for 5 days.  Best taken at breakfast or lunch. 5 tablet 0    amoxicillin clavulanate 875-125 MG Oral Tab Take 1 tablet by mouth 2 (two) times daily for 10 days. 20 tablet 0    lisinopril 5 MG Oral Tab Take 1 tablet (5 mg total) by mouth daily. 90 tablet 3    levothyroxine 25 MCG Oral Tab Take 1 tablet (25 mcg total) by mouth before breakfast. 90 tablet 3    hydroCHLOROthiazide 12.5 MG Oral Tab Take 1 tablet (12.5 mg total) by mouth daily. 90 tablet 3    atorvastatin 20 MG Oral Tab Take 1 tablet (20 mg total) by mouth nightly. 90 tablet 3    Insulin Pen Needle (BD PEN NEEDLE MAG U/F) 32G X 4 MM Does not apply Misc USE A NEW NEEDLE WITH EACH USE 1 Box 1    Blood Gluc Meter Disp-Strips Does not apply Device Needs 1 glucometer along with 100 test strips and 100 lancets with 11 refills each. Check sugars BID and prn. 1 Device 0            PHYSICAL EXAM:   [facility] 09/01/2014 (Approximate)      There is no height or weight on file to calculate BMI.        General: No immediate distress   Extremities: peripheral pulses intact, no lower extremity edema  bilaterally   Skin: No lesions noted.   Neuro:   Strength: Upper extremities have 5/5 strength  Muscle bulk: No atrophy  Sensation: Normal upper extremities  Reflexes: Diminished upper extremities  Tinel's sign: Positive bilaterally     EMG/NCV  Sensory Nerve Conduction Study       Nerve / Sites Peak Lat Amp Segments Distance     ms µV   cm   R Median - Dig III (Antidromic)      Wrist 5.3 7.2 Wrist - Dig III 14      Palm 2.2 12.2 Palm - Dig III 7   L Median - Dig III (Antidromic)      Wrist NR NR Wrist - Dig III 14      Palm NR NR Palm - Dig III 7   R Ulnar - Dig V (Antidromic)      Wrist 3.2 27.2 Wrist - Dig V 14   L Ulnar - Dig V (Antidromic)      Wrist 3.2 34.4 Wrist - Dig V 14       Motor Nerve Conduction Study       Nerve / Sites Latency Amplitude Rel Amp Dur. Dur. Segments Distance Velocity Area Area     ms mV % ms %   cm m/s mVms %   R Median - APB      Wrist 7.6 4.2   6.8 100 Wrist - APB 8   16.4 100      Elbow 11.5 4.4 104 7.1 104 Elbow - Wrist 19 49 16.7 102   L Median - APB      Wrist 7.0 6.2   6.9 100 Wrist - APB 8   22.5 100      Elbow 10.6 5.9 95.7 7.0 102 Elbow - Wrist 18 49 21.4 95   R Ulnar - ADM      Wrist 2.7 13.0 100 6.5 100 Wrist - ADM 8   34.4 100      B.Elbow 6.1 12.3 94.8 6.7 102 B.Elbow - Wrist 18 54 34.0 98.6   L Ulnar - ADM      Wrist 2.6 12.4 100 5.4 100 Wrist - ADM 8   35.7 100                   EMG Summary Table       Spontaneous MUAP Recruitment   Muscle IA Fib PSW Fasc Comments Amp Dur. PPP Pattern   R. Deltoid N None None None None N N N N   R. Triceps brachii N None None None None N N N N   R. Biceps brachii N None None None None N N N N   R. Flexor carpi radialis N None None None None N N N N   R. First dorsal interosseous N None None None None N N N N   R. Abductor pollicis brevis N None None None None N 1+ N Reduced   L. Deltoid N None None None None N N N N   L. Triceps brachii N None None None None N N N N   L. Biceps brachii N None None None None N N N N   L. Flexor carpi  radialis N None None None None N N N N   L. First dorsal interosseous N None None None None N N N N   L. Abductor pollicis brevis N None None None None N 1+ N Reduced                       Findings: Extremities were warmed with hot packs for 15 minutes prior to testing and skin temperature maintained above 32 C.  Sensory nerve conduction studies revealed an absent left median response.  The right median response had a prolonged latency and small amplitudes with conduction delay across the transverse carpal ligament.  Ulnar responses were normal and symmetric bilaterally.  Motor nerve conduction studies revealed bilaterally prolonged median distal latencies and slow conduction velocities.  Amplitudes are relatively low compared to the ipsilateral ulnar amplitudes.  The ulnar motor studies were normal and symmetric bilaterally.  Needle EMG revealed abnormal motor units in the abductor pollicis brevis bilaterally.  All other muscles tested were normal.  Please see the EMG summary table for list of muscles tested.  Impression:  1.  Abnormal study.  2.  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  On the right there is sensorimotor demyelination and conduction block across the transverse carpal ligament.  There is also sensory axon loss.  On the left there is severe sensory axon loss along with motor fiber demyelination.  There is chronic axon loss bilaterally.  3.  No electrodiagnostic evidence of cervical radiculopathy bilaterally.     ASSESSMENT AND PLAN:  1. Bilateral carpal tunnel syndrome  The patient has clear clinical and electrodiagnostic evidence consistent with carpal tunnel syndrome.  Given the fact that the symptoms are chronic and the patient has failed nonoperative treatment, consideration of operative intervention is appropriate.           Thank you for the opportunity to participate in the care of this patient.  Sincerely,    Kenny Griffith M.D.  Diplomate  American Board of Physical Medicine and Rehabilitation        Ralph Monterroso, DO at 10/15/2024  7:59 AM    Status: Signed   Please inform her that her EMG/NCV by Dr. Griffith shows severe carpal tunnel syndrome on the left and moderate on the right.  I am going to do a referral to the hand specialist as she will most likely need surgery done. Call [phone] for Dr. Harrison.

## 2024-10-18 DIAGNOSIS — E11.65 UNCONTROLLED TYPE 2 DIABETES MELLITUS WITH HYPERGLYCEMIA, WITHOUT LONG-TERM CURRENT USE OF INSULIN (HCC): ICD-10-CM

## 2024-10-21 RX ORDER — METFORMIN HYDROCHLORIDE 500 MG/1
TABLET, EXTENDED RELEASE ORAL
Qty: 270 TABLET | Refills: 3 | Status: SHIPPED | OUTPATIENT
Start: 2024-10-21

## 2024-11-11 ENCOUNTER — OFFICE VISIT (OUTPATIENT)
Dept: SURGERY | Facility: CLINIC | Age: 64
End: 2024-11-11
Payer: COMMERCIAL

## 2024-11-11 DIAGNOSIS — M65.332 TRIGGER MIDDLE FINGER OF LEFT HAND: ICD-10-CM

## 2024-11-11 DIAGNOSIS — G56.03 BILATERAL CARPAL TUNNEL SYNDROME: Primary | ICD-10-CM

## 2024-11-11 DIAGNOSIS — G56.02 CARPAL TUNNEL SYNDROME ON LEFT: ICD-10-CM

## 2024-11-11 DIAGNOSIS — M65.352 TRIGGER LITTLE FINGER OF LEFT HAND: ICD-10-CM

## 2024-11-11 PROCEDURE — 99244 OFF/OP CNSLTJ NEW/EST MOD 40: CPT | Performed by: PLASTIC SURGERY

## 2024-11-11 RX ORDER — HYDROCODONE BITARTRATE AND ACETAMINOPHEN 7.5; 325 MG/1; MG/1
1 TABLET ORAL
Qty: 10 TABLET | Refills: 0 | Status: SHIPPED | OUTPATIENT
Start: 2024-11-11

## 2024-11-11 NOTE — H&P
Valeria Telles is a 64 year old female that presents with   Chief Complaint   Patient presents with    Carpal Tunnel Syndrome     Bilateral   .    REFERRED BY:  Ralph Monterroso     Pacemaker: No  Latex Allergy: no  Coumadin: No  Plavix: No  Other anticoagulants: No  Diet medication: No  Cardiac stents: No    HAND DOMINANCE:  Right    Profession: / computer work    RECONSTRUCTIVE HISTORY    SUN EXPOSURE   Current no   Past no   Sunburns yes   Tanning salons current no   Tanning salons past no     SKIN CANCER    Personal history of skin cancer: none                 ++++++++++++++++++++++++++++++++++++++++++++++++++    ELECTRODIAGNOSTIC STUDIES  -   JESSICA    DATE: 10/14/2024    RIGHT  Carpal Tunnel Syndrome: Moderate  Motor Latency: 7.6  Sensory Antidromic: 7.2  APB Normal: no  Neurogenic Changes: yes  Denervation Potentials: no      LEFT  Carpal Tunnel Syndrome: severe  Motor Latency: 7.0  Sensory Antidromic: NR  APB Normal: no  Neurogenic Changes: yes  Denervation Potentials: no      HPI:       64-year-old female right-hand-dominant with bilateral hand numbness    Left worse than right    2 years duration    Intermittent middle ring and small    Night symptoms    The left hand is weak and she drops things, she has difficulty opening bottles    LSF trigger for 2 years but no pain    LMF trigger for 2 years, no pain      Review of Systems:   Constitutional: No change in appetite, chill/rigors, or fatigue  GI: No jaundice  Endocrine: No generalized weakness  Neurological: No aphasia, loss of consciousness, or seizures    Musculoskeletal:     Duration    2 years       NUMBNESS / TINGLING      RIGHT  Intermittent  middle finger  ring finger  small finger      LEFT  Intermittent  middle finger  ring finger  small finger    Which hand is worse?  left     Pain:    Intermittent when she makes a fist with LH of all the fingers. Rates pain 7/10.  Not taking analgesics.    Night symptoms:  Yes    Functional problems:  Yes with LH weakness,drops things,difficulty opening bottles and making a tight L fist.    Previous therapy:  Yes   splint LH,ineffective.    EMG done 10/14/24    LSF triggers past 2 years also,no pain,no previous therapies.      PMH:     MEDICAL  Past Medical History:    Anesthesia complication    Diabetes (HCC)    DM2 (diabetes mellitus, type 2) (HCC)    Essential hypertension    High blood pressure    High cholesterol    Hyperlipidemia    Migraines    Obesity (BMI 30-39.9)    PONV (postoperative nausea and vomiting)        SURGICAL  Past Surgical History:   Procedure Laterality Date          Colonoscopy N/A 2021    Procedure: COLONOSCOPY;  Surgeon: Juan Johnson MD;  Location: [facility] ENDOSCOPY    Dilation/curettage,diagnostic      Reduction left          Reduction right              ALLERIGIES  Allergies[1]     MEDICATIONS  Current Outpatient Medications   Medication Sig Dispense Refill    metFORMIN  MG Oral Tablet 24 Hr Take 2 tablets (1,000 mg total) by mouth daily with breakfast AND 1 tablet (500 mg total) daily with dinner. 270 tablet 3    dexamethasone (DECADRON) 4 MG tablet 1 by mouth each day for 5 days.  Best taken at breakfast or lunch. 5 tablet 0    lisinopril 5 MG Oral Tab Take 1 tablet (5 mg total) by mouth daily. 90 tablet 3    levothyroxine 25 MCG Oral Tab Take 1 tablet (25 mcg total) by mouth before breakfast. 90 tablet 3    hydroCHLOROthiazide 12.5 MG Oral Tab Take 1 tablet (12.5 mg total) by mouth daily. 90 tablet 3    atorvastatin 20 MG Oral Tab Take 1 tablet (20 mg total) by mouth nightly. 90 tablet 3    Insulin Pen Needle (BD PEN NEEDLE MAG U/F) 32G X 4 MM Does not apply Misc USE A NEW NEEDLE WITH EACH USE 1 Box 1    Blood Gluc Meter Disp-Strips Does not apply Device Needs 1 glucometer along with 100 test strips and 100 lancets with 11 refills each. Check sugars BID and prn. 1 Device 0        SOCIAL HISTORY  Social History     Socioeconomic History     Marital status:    Tobacco Use    Smoking status: Never    Smokeless tobacco: Never   Vaping Use    Vaping status: Never Used   Substance and Sexual Activity    Alcohol use: Yes     Comment: Socially    Drug use: No   Other Topics Concern    Caffeine Concern Yes     Comment: Coffee, 1 cup daily    Exercise No   Social History Narrative    The patient does not use an assistive device..      The patient does live in a home with stairs.        FAMILY HISTORY  Family History   Problem Relation Age of Onset    Cancer Mother         Cancer - liver    Other (Other) Father         alzheimers    Diabetes Neg     Glaucoma Neg           PHYSICAL EXAM:     CONSTITUTIONAL: Overall appearance - Normal  HEENT: Normocephalic  EYES: Conjunctiva - Right: Normal, Left: Normal; EOMI  EARS: Inspection - Right: Normal, Left: Normal  NECK/THYROID: Inspection - Normal, Palpation - Normal, Thyroid gland - Normal, No adenopathy  RESPIRATORY: Inspection - Normal, Effort - Normal  CARDIOVASCULAR: Regular rhythm, No murmurs  ABDOMEN: Inspection - Normal, No abdominal tenderness  NEURO: Memory intact  PSYCH: Oriented to person, place, time, and situation, Appropriate mood and affect        Hand Physical Exam:    ROM: Right full painless  Left some limitation of flexion, triggering of the middle and small fingers  Wrist Hyperextension: bilateral excellent  Thenar Intrinsics: bilateral intact/symmetric  Ulnar Intrinsics: bilateral intact/symmetric  Wrist Tenderness: bilateral none  Sensation: bilateral grossly intact    Median Nerve Compression: bilateral negative    Tinel Median at Wrist: Right palm, LMF/LRF          ASSESSMENT/PLAN:       CARPAL TUNNEL SYNDROME  bilateral        SEVERITY INDEX    NR sensory latencies, Severe electrodiagnostic changes      DISPOSITION    We had a long discussion.  I explained to the patient what carpal tunnel syndrome is including treatment options.  The patient  may not have relief of symptoms with  treatment.      This is a severe carpal tunnel syndrome.    Because of the severity (see Severity Index) of this carpal tunnel, non-operative treatment is not indicated. This requires surgery.  We discussed the procedure at length, including risks as indicated on the Surgical Request Form.  Questions were answered and the patient wishes to proceed with treatment.    Some symptoms may persist   Some symptoms may not improve   Some symptoms may worsen   Further surgery may be necessary    Even with surgery that decompresses the nerve, nerve function may not return.  After relief of pressure on the nerve, the nerve must recover.  This could take up to a year, but the nerve may not recover even after pressure is relieved, so symptoms may persist.    POST-OPERATIVE  PROTOCOL:  We discussed post-operative restrictions at length.  It is critical to maintain the dressing post-operatively and to comply with post-operative instructions.  The dressing must be carefully cared for, must remain dry, and cannot be removed under any circumstance.  Consistent elevation of the hand above heart level is critical.  Therapy will be necessary post-operatively.  Failure to comply with post-operative instructions, including therapy, will have significant impact on the final result, and could lead to permanent pain, stiffness, weakness, and loss of function.    Even with satisfactory healing, the hand/digit may not regain normal ROM or normal function.    TRIGGER DIGIT LMF/LSF      We discussed what a TRIGGER DIGIT is, including treatment options.  Questions were answered and the patient wishes to proceed with treatment.     This needs surgery.   I explained the procedure at length, including  post-operative course and risks as indicated on the Surgical Request Form.  Therapy may be necessary post-operatively.             PLAN    LECTR, LSF trigger, LMF trigger    RECTR 6 weeks later      RISKS:      Bleeding  Infection  Scar  Pain  Stiffness  Weakness  Loss of function  Anesthesia risks      [place]  Data Reviewed.         '    11/11/2024  Barber Harrison MD      +++++++++++++++++++++++++++++++++++++++++++++++++    MEDICAL DECISION MAKING    PROBLEMS      MODERATE    (number / complexity)          Undiagnosed new problem with uncertain prognosis    DATA         STRAIGHTFORWARD    (amount / complexity)           MANAGEMENT RISK  HIGH    (complications/ morbidity)       Major surgery with risk factors                  MDM LEVEL    MODERATE              [1]   Allergies  Allergen Reactions    Iodine (Topical) RASH    Radiology Contrast Iodinated Dyes RASH

## 2024-11-11 NOTE — H&P (VIEW-ONLY)
Valeria Telles is a 64 year old female that presents with   Chief Complaint   Patient presents with    Carpal Tunnel Syndrome     Bilateral   .    REFERRED BY:  Ralph Monterroso     Pacemaker: No  Latex Allergy: no  Coumadin: No  Plavix: No  Other anticoagulants: No  Diet medication: No  Cardiac stents: No    HAND DOMINANCE:  Right    Profession: / computer work    RECONSTRUCTIVE HISTORY    SUN EXPOSURE   Current no   Past no   Sunburns yes   Tanning salons current no   Tanning salons past no     SKIN CANCER    Personal history of skin cancer: none                 ++++++++++++++++++++++++++++++++++++++++++++++++++    ELECTRODIAGNOSTIC STUDIES  -   JESSICA    DATE: 10/14/2024    RIGHT  Carpal Tunnel Syndrome: Moderate  Motor Latency: 7.6  Sensory Antidromic: 7.2  APB Normal: no  Neurogenic Changes: yes  Denervation Potentials: no      LEFT  Carpal Tunnel Syndrome: severe  Motor Latency: 7.0  Sensory Antidromic: NR  APB Normal: no  Neurogenic Changes: yes  Denervation Potentials: no      HPI:       64-year-old female right-hand-dominant with bilateral hand numbness    Left worse than right    2 years duration    Intermittent middle ring and small    Night symptoms    The left hand is weak and she drops things, she has difficulty opening bottles    LSF trigger for 2 years but no pain    LMF trigger for 2 years, no pain      Review of Systems:   Constitutional: No change in appetite, chill/rigors, or fatigue  GI: No jaundice  Endocrine: No generalized weakness  Neurological: No aphasia, loss of consciousness, or seizures    Musculoskeletal:     Duration    2 years       NUMBNESS / TINGLING      RIGHT  Intermittent  middle finger  ring finger  small finger      LEFT  Intermittent  middle finger  ring finger  small finger    Which hand is worse?  left     Pain:    Intermittent when she makes a fist with LH of all the fingers. Rates pain 7/10.  Not taking analgesics.    Night symptoms:  Yes    Functional problems:  Yes with LH weakness,drops things,difficulty opening bottles and making a tight L fist.    Previous therapy:  Yes   splint LH,ineffective.    EMG done 10/14/24    LSF triggers past 2 years also,no pain,no previous therapies.      PMH:     MEDICAL  Past Medical History:    Anesthesia complication    Diabetes (HCC)    DM2 (diabetes mellitus, type 2) (HCC)    Essential hypertension    High blood pressure    High cholesterol    Hyperlipidemia    Migraines    Obesity (BMI 30-39.9)    PONV (postoperative nausea and vomiting)        SURGICAL  Past Surgical History:   Procedure Laterality Date          Colonoscopy N/A 2021    Procedure: COLONOSCOPY;  Surgeon: Juan Johnson MD;  Location: [facility] ENDOSCOPY    Dilation/curettage,diagnostic      Reduction left          Reduction right              ALLERIGIES  Allergies[1]     MEDICATIONS  Current Outpatient Medications   Medication Sig Dispense Refill    metFORMIN  MG Oral Tablet 24 Hr Take 2 tablets (1,000 mg total) by mouth daily with breakfast AND 1 tablet (500 mg total) daily with dinner. 270 tablet 3    dexamethasone (DECADRON) 4 MG tablet 1 by mouth each day for 5 days.  Best taken at breakfast or lunch. 5 tablet 0    lisinopril 5 MG Oral Tab Take 1 tablet (5 mg total) by mouth daily. 90 tablet 3    levothyroxine 25 MCG Oral Tab Take 1 tablet (25 mcg total) by mouth before breakfast. 90 tablet 3    hydroCHLOROthiazide 12.5 MG Oral Tab Take 1 tablet (12.5 mg total) by mouth daily. 90 tablet 3    atorvastatin 20 MG Oral Tab Take 1 tablet (20 mg total) by mouth nightly. 90 tablet 3    Insulin Pen Needle (BD PEN NEEDLE MAG U/F) 32G X 4 MM Does not apply Misc USE A NEW NEEDLE WITH EACH USE 1 Box 1    Blood Gluc Meter Disp-Strips Does not apply Device Needs 1 glucometer along with 100 test strips and 100 lancets with 11 refills each. Check sugars BID and prn. 1 Device 0        SOCIAL HISTORY  Social History     Socioeconomic History     Marital status:    Tobacco Use    Smoking status: Never    Smokeless tobacco: Never   Vaping Use    Vaping status: Never Used   Substance and Sexual Activity    Alcohol use: Yes     Comment: Socially    Drug use: No   Other Topics Concern    Caffeine Concern Yes     Comment: Coffee, 1 cup daily    Exercise No   Social History Narrative    The patient does not use an assistive device..      The patient does live in a home with stairs.        FAMILY HISTORY  Family History   Problem Relation Age of Onset    Cancer Mother         Cancer - liver    Other (Other) Father         alzheimers    Diabetes Neg     Glaucoma Neg           PHYSICAL EXAM:     CONSTITUTIONAL: Overall appearance - Normal  HEENT: Normocephalic  EYES: Conjunctiva - Right: Normal, Left: Normal; EOMI  EARS: Inspection - Right: Normal, Left: Normal  NECK/THYROID: Inspection - Normal, Palpation - Normal, Thyroid gland - Normal, No adenopathy  RESPIRATORY: Inspection - Normal, Effort - Normal  CARDIOVASCULAR: Regular rhythm, No murmurs  ABDOMEN: Inspection - Normal, No abdominal tenderness  NEURO: Memory intact  PSYCH: Oriented to person, place, time, and situation, Appropriate mood and affect        Hand Physical Exam:    ROM: Right full painless  Left some limitation of flexion, triggering of the middle and small fingers  Wrist Hyperextension: bilateral excellent  Thenar Intrinsics: bilateral intact/symmetric  Ulnar Intrinsics: bilateral intact/symmetric  Wrist Tenderness: bilateral none  Sensation: bilateral grossly intact    Median Nerve Compression: bilateral negative    Tinel Median at Wrist: Right palm, LMF/LRF          ASSESSMENT/PLAN:       CARPAL TUNNEL SYNDROME  bilateral        SEVERITY INDEX    NR sensory latencies, Severe electrodiagnostic changes      DISPOSITION    We had a long discussion.  I explained to the patient what carpal tunnel syndrome is including treatment options.  The patient  may not have relief of symptoms with  treatment.      This is a severe carpal tunnel syndrome.    Because of the severity (see Severity Index) of this carpal tunnel, non-operative treatment is not indicated. This requires surgery.  We discussed the procedure at length, including risks as indicated on the Surgical Request Form.  Questions were answered and the patient wishes to proceed with treatment.    Some symptoms may persist   Some symptoms may not improve   Some symptoms may worsen   Further surgery may be necessary    Even with surgery that decompresses the nerve, nerve function may not return.  After relief of pressure on the nerve, the nerve must recover.  This could take up to a year, but the nerve may not recover even after pressure is relieved, so symptoms may persist.    POST-OPERATIVE  PROTOCOL:  We discussed post-operative restrictions at length.  It is critical to maintain the dressing post-operatively and to comply with post-operative instructions.  The dressing must be carefully cared for, must remain dry, and cannot be removed under any circumstance.  Consistent elevation of the hand above heart level is critical.  Therapy will be necessary post-operatively.  Failure to comply with post-operative instructions, including therapy, will have significant impact on the final result, and could lead to permanent pain, stiffness, weakness, and loss of function.    Even with satisfactory healing, the hand/digit may not regain normal ROM or normal function.    TRIGGER DIGIT LMF/LSF      We discussed what a TRIGGER DIGIT is, including treatment options.  Questions were answered and the patient wishes to proceed with treatment.     This needs surgery.   I explained the procedure at length, including  post-operative course and risks as indicated on the Surgical Request Form.  Therapy may be necessary post-operatively.             PLAN    LECTR, LSF trigger, LMF trigger    RECTR 6 weeks later      RISKS:      Bleeding  Infection  Scar  Pain  Stiffness  Weakness  Loss of function  Anesthesia risks      [place]  Data Reviewed.         '    11/11/2024  Barber Harrison MD      +++++++++++++++++++++++++++++++++++++++++++++++++    MEDICAL DECISION MAKING    PROBLEMS      MODERATE    (number / complexity)          Undiagnosed new problem with uncertain prognosis    DATA         STRAIGHTFORWARD    (amount / complexity)           MANAGEMENT RISK  HIGH    (complications/ morbidity)       Major surgery with risk factors                  MDM LEVEL    MODERATE              [1]   Allergies  Allergen Reactions    Iodine (Topical) RASH    Radiology Contrast Iodinated Dyes RASH

## 2024-11-11 NOTE — PROGRESS NOTES
Patient request for surgery signed by patient and witnessed and signed by RN.  Prescription for Norco 7.5 mg electronically sent to pharmacy per Dr. Harrison's order and patient instructed to  prescription before surgery.   Pre-Surgical Instruction Handout, Hand Elevation Handout and Post-Operative Instruction Handout given to and reviewed w/patient.  All questions and concerns answered; pt verbalized an understanding of all pre-operative teaching.  Patient instructed to call the office with any further questions and/or concerns.  Patient escorted to surgery scheduling to schedule surgery and post-operative appointments.

## 2024-11-12 ENCOUNTER — PATIENT MESSAGE (OUTPATIENT)
Dept: SURGERY | Facility: CLINIC | Age: 64
End: 2024-11-12

## 2024-11-18 ENCOUNTER — TELEPHONE (OUTPATIENT)
Dept: SURGERY | Facility: CLINIC | Age: 64
End: 2024-11-18

## 2024-11-18 NOTE — TELEPHONE ENCOUNTER
Received [facility] paperwork via fax scanned and email to forms dept. No RUTH ANN or Payment collected.

## 2024-11-19 ENCOUNTER — TELEPHONE (OUTPATIENT)
Dept: SURGERY | Facility: CLINIC | Age: 64
End: 2024-11-19

## 2024-11-19 NOTE — TELEPHONE ENCOUNTER
Spoke w/ pt.  Pt requesting our office to fax her Work Status note after surgery scheduled for 11/22.  Fax to [employer] [phone].  Pt informed we can fax when we are in clinic next day Mon 11/25.  Please notify pt via [employer] when completed.  Pt verbalized understanding.

## 2024-11-19 NOTE — TELEPHONE ENCOUNTER
Received disability forms in the forms department via e-mail. No Authorization for Release of Information given, Sent [employer] message for Release of Information authorization. Logged for processing.

## 2024-11-20 NOTE — DISCHARGE INSTRUCTIONS
HOME INSTRUCTIONS  Dr Harrison Discharge Instructions      Barber Harrison M.D.   [phone]  Plastic and Reconstructive Surgery, Hand Surgery  [address], [address]  [place] [address]     GENERAL INSTRUCTIONS:  Do not remove dressing for any reason.  Keep dressing clean and dry.  Some drainage (blood and fluid) through the dressing is expected.  Some swelling is normal.  Take medications as directed.      HANDS:  Keep elevated (above heart-level) at all times.  Check fingertips for circulation.  Start hand exercises today, as instructed in Occupational Therapy  Keep in a sling at all times.         YOU HAVE AN APPOINTMENT AT THE OFFICE ON __11/25/24__            [facility] HOME CARE INSTRUCTIONS: POST-OP ANESTHESIA  The medication that you received for sedation or general anesthesia can last up to 24 hours. Your judgment and reflexes may be altered, even if you feel like your normal self.      We Recommend:   Do not drive any motor vehicle or bicycle   Avoid mowing the lawn, playing sports, or working with power tools/applicances (power saws, electric knives or mixers)   That you have someone stay with you on your first night home   Do not drink alcohol or take sleeping pills or tranquilizers   Do not sign legal documents within 24 hours of your procedure   If you had a nerve block for your surgery, take extra care not to put any pressure on your arm or hand for 24 hours    It is normal:  For you to have a sore throat if you had a breathing tube during surgery (while you were asleep!). The sore throat should get better within 48 hours. You can gargle with warm salt water (1/2 tsp in 4 oz warm water) or use a throat lozenge for comfort  To feel muscle aches or soreness especially in the abdomen, chest or neck. The achy feeling should go away in the next 24 hours  To feel weak, sleepy or \"wiped out\". Your should start feeling better in the next 24 hours.   To experience mild  discomforts such as sore lip or tongue, headache, cramps, gas pains or a bloated feeling in your abdomen.   To experience mild back pain or soreness for a day or two if you had spinal or epidural anesthesia.   If you had laparoscopic surgery, to feel shoulder pain or discomfort on the day of surgery.   For some patients to have nausea after surgery/anesthesia    If you feel nausea or experience vomiting:   Try to move around less.   Eat less than usual or drink only liquids until the next morning   Nausea should resolve in about 24 hours    If you have a problem when you are at home:    Call your surgeons office   Discharge Instructions: After Your Surgery  You’ve just had surgery. During surgery, you were given medicine called anesthesia to keep you relaxed and free of pain. After surgery, you may have some pain or nausea. This is common. Here are some tips for feeling better and getting well after surgery.   Going home  Your healthcare provider will show you how to take care of yourself when you go home. They'll also answer your questions. Have an adult family member or friend drive you home. For the first 24 hours after your surgery:   Don't drive or use heavy equipment.  Don't make important decisions or sign legal papers.  Take medicines as directed.  Don't drink alcohol.  Have someone stay with you, if needed. They can watch for problems and help keep you safe.  Be sure to go to all follow-up visits with your healthcare provider. And rest after your surgery for as long as your provider tells you to.   Coping with pain  If you have pain after surgery, pain medicine will help you feel better. Take it as directed, before pain becomes severe. Also, ask your healthcare provider or pharmacist about other ways to control pain. This might be with heat, ice, or relaxation. And follow any other instructions your surgeon or nurse gives you.      Stay on schedule with your medicine.     Tips for taking pain medicine  To  get the best relief possible, remember these points:   Pain medicines can upset your stomach. Taking them with a little food may help.  Most pain relievers taken by mouth need at least 20 to 30 minutes to start to work.  Don't wait till your pain becomes severe before you take your medicine. Try to time your medicine so that you can take it before starting an activity. This might be before you get dressed, go for a walk, or sit down for dinner.  Constipation is a common side effect of some pain medicines. Call your healthcare provider before taking any medicines such as laxatives or stool softeners to help ease constipation. Also ask if you should skip any foods. Drinking lots of fluids and eating foods such as fruits and vegetables that are high in fiber can also help. Remember, don't take laxatives unless your surgeon has prescribed them.  Drinking alcohol and taking pain medicine can cause dizziness and slow your breathing. It can even be deadly. Don't drink alcohol while taking pain medicine.  Pain medicine can make you react more slowly to things. Don't drive or run machinery while taking pain medicine.  Your healthcare provider may tell you to take acetaminophen to help ease your pain. Ask them how much you're supposed to take each day. Acetaminophen or other pain relievers may interact with your prescription medicines or other over-the-counter (OTC) medicines. Some prescription medicines have acetaminophen and other ingredients in them. Using both prescription and OTC acetaminophen for pain can cause you to accidentally overdose. Read the labels on your OTC medicines with care. This will help you to clearly know the list of ingredients, how much to take, and any warnings. It may also help you not take too much acetaminophen. If you have questions or don't understand the information, ask your pharmacist or healthcare provider to explain it to you before you take the OTC medicine.   Managing nausea  Some people  have an upset stomach (nausea) after surgery. This is often because of anesthesia, pain, or pain medicine, less movement of food in the stomach, or the stress of surgery. These tips will help you handle nausea and eat healthy foods as you get better. If you were on a special food plan before surgery, ask your healthcare provider if you should follow it while you get better. Check with your provider on how your eating should progress. It may depend on the surgery you had. These general tips may help:   Don't push yourself to eat. Your body will tell you when to eat and how much.  Start off with clear liquids and soup. They're easier to digest.  Next try semi-solid foods as you feel ready. These include mashed potatoes, applesauce, and gelatin.  Slowly move to solid foods. Don’t eat fatty, rich, or spicy foods at first.  Don't force yourself to have 3 large meals a day. Instead eat smaller amounts more often.  Take pain medicines with a small amount of solid food, such as crackers or toast. This helps prevent nausea.  When to call your healthcare provider  Call your healthcare provider right away if you have any of these:   You still have too much pain, or the pain gets worse, after taking the medicine. The medicine may not be strong enough. Or there may be a complication from the surgery.  You feel too sleepy, dizzy, or groggy. The medicine may be too strong.  Side effects such as nausea or vomiting. Your healthcare provider may advise taking other medicines to .  Skin changes such as rash, itching, or hives. This may mean you have an allergic reaction. Your provider may advise taking other medicines.  The incision looks different (for instance, part of it opens up).  Bleeding or fluid leaking from the incision site, and weren't told to expect that.  Fever of 100.4°F (38°C) or higher, or as directed by your provider.  Call 911  Call 911 right away if you have:   Trouble breathing  Facial swelling    If you have  obstructive sleep apnea   You were given anesthesia medicine during surgery to keep you comfortable and free of pain. After surgery, you may have more apnea spells because of this medicine and other medicines you were given. The spells may last longer than normal.    At home:  Keep using the continuous positive airway pressure (CPAP) device when you sleep. Unless your healthcare provider tells you not to, use it when you sleep, day or night. CPAP is a common device used to treat obstructive sleep apnea.  Talk with your provider before taking any pain medicine, muscle relaxants, or sedatives. Your provider will tell you about the possible dangers of taking these medicines.  Contact your provider if your sleeping changes a lot even when taking medicines as directed.  Jodi last reviewed this educational content on 10/1/2021  © 7309-5314 [employer]. All rights reserved. This information is not intended as a substitute for professional medical care. Always follow your healthcare professional's instructions.

## 2024-11-21 ENCOUNTER — TELEPHONE (OUTPATIENT)
Dept: SURGERY | Facility: CLINIC | Age: 64
End: 2024-11-21

## 2024-11-21 NOTE — TELEPHONE ENCOUNTER
Received [facility] paperwork from [employer] via fax. No RUTH ANN or Payment collected. Scanned and email to forms dept.

## 2024-11-22 ENCOUNTER — HOSPITAL ENCOUNTER (OUTPATIENT)
Facility: HOSPITAL | Age: 64
Setting detail: HOSPITAL OUTPATIENT SURGERY
Discharge: HOME | End: 2024-11-22
Attending: PLASTIC SURGERY | Admitting: PLASTIC SURGERY
Payer: COMMERCIAL

## 2024-11-22 ENCOUNTER — ANESTHESIA (OUTPATIENT)
Dept: SURGERY | Facility: HOSPITAL | Age: 64
End: 2024-11-22
Payer: COMMERCIAL

## 2024-11-22 ENCOUNTER — HOSPITAL DOCUMENTATION (OUTPATIENT)
Dept: SURGERY | Facility: CLINIC | Age: 64
End: 2024-11-22

## 2024-11-22 ENCOUNTER — ANESTHESIA EVENT (OUTPATIENT)
Dept: SURGERY | Facility: HOSPITAL | Age: 64
End: 2024-11-22
Payer: COMMERCIAL

## 2024-11-22 VITALS
TEMPERATURE: 97 F | OXYGEN SATURATION: 94 % | WEIGHT: 154 LBS | BODY MASS INDEX: 31.04 KG/M2 | SYSTOLIC BLOOD PRESSURE: 140 MMHG | HEIGHT: 59 IN | DIASTOLIC BLOOD PRESSURE: 83 MMHG | HEART RATE: 58 BPM | RESPIRATION RATE: 16 BRPM

## 2024-11-22 DIAGNOSIS — M65.332 TRIGGER MIDDLE FINGER OF LEFT HAND: ICD-10-CM

## 2024-11-22 DIAGNOSIS — G56.02 CARPAL TUNNEL SYNDROME ON LEFT: Primary | ICD-10-CM

## 2024-11-22 DIAGNOSIS — M65.352 TRIGGER LITTLE FINGER OF LEFT HAND: ICD-10-CM

## 2024-11-22 PROBLEM — Z04.9 OBSERVATION FOR SUSPECTED CONDITION: Status: ACTIVE | Noted: 2024-11-22

## 2024-11-22 LAB
GLUCOSE BLDC GLUCOMTR-MCNC: 112 MG/DL (ref 70–99)
GLUCOSE BLDC GLUCOMTR-MCNC: 99 MG/DL (ref 70–99)

## 2024-11-22 PROCEDURE — 29848 WRIST ENDOSCOPY/SURGERY: CPT | Performed by: PLASTIC SURGERY

## 2024-11-22 PROCEDURE — 26055 INCISE FINGER TENDON SHEATH: CPT | Performed by: PLASTIC SURGERY

## 2024-11-22 PROCEDURE — 0LN80ZZ RELEASE LEFT HAND TENDON, OPEN APPROACH: ICD-10-PCS | Performed by: PLASTIC SURGERY

## 2024-11-22 PROCEDURE — 01N54ZZ RELEASE MEDIAN NERVE, PERCUTANEOUS ENDOSCOPIC APPROACH: ICD-10-PCS | Performed by: PLASTIC SURGERY

## 2024-11-22 RX ORDER — MORPHINE SULFATE 4 MG/ML
4 INJECTION, SOLUTION INTRAMUSCULAR; INTRAVENOUS EVERY 10 MIN PRN
Status: DISCONTINUED | OUTPATIENT
Start: 2024-11-22 | End: 2024-11-22

## 2024-11-22 RX ORDER — DEXTROSE MONOHYDRATE 25 G/50ML
50 INJECTION, SOLUTION INTRAVENOUS
Status: DISCONTINUED | OUTPATIENT
Start: 2024-11-22 | End: 2024-11-22

## 2024-11-22 RX ORDER — HYDROMORPHONE HYDROCHLORIDE 1 MG/ML
0.2 INJECTION, SOLUTION INTRAMUSCULAR; INTRAVENOUS; SUBCUTANEOUS EVERY 5 MIN PRN
Status: DISCONTINUED | OUTPATIENT
Start: 2024-11-22 | End: 2024-11-22

## 2024-11-22 RX ORDER — ONDANSETRON 2 MG/ML
INJECTION INTRAMUSCULAR; INTRAVENOUS AS NEEDED
Status: DISCONTINUED | OUTPATIENT
Start: 2024-11-22 | End: 2024-11-22 | Stop reason: SURG

## 2024-11-22 RX ORDER — FAMOTIDINE 20 MG/1
20 TABLET, FILM COATED ORAL ONCE
Status: COMPLETED | OUTPATIENT
Start: 2024-11-22 | End: 2024-11-22

## 2024-11-22 RX ORDER — METOCLOPRAMIDE 10 MG/1
10 TABLET ORAL ONCE
Status: COMPLETED | OUTPATIENT
Start: 2024-11-22 | End: 2024-11-22

## 2024-11-22 RX ORDER — DEXAMETHASONE SODIUM PHOSPHATE 4 MG/ML
VIAL (ML) INJECTION AS NEEDED
Status: DISCONTINUED | OUTPATIENT
Start: 2024-11-22 | End: 2024-11-22 | Stop reason: SURG

## 2024-11-22 RX ORDER — HYDROMORPHONE HYDROCHLORIDE 1 MG/ML
0.6 INJECTION, SOLUTION INTRAMUSCULAR; INTRAVENOUS; SUBCUTANEOUS EVERY 5 MIN PRN
Status: DISCONTINUED | OUTPATIENT
Start: 2024-11-22 | End: 2024-11-22

## 2024-11-22 RX ORDER — METOCLOPRAMIDE HYDROCHLORIDE 5 MG/ML
10 INJECTION INTRAMUSCULAR; INTRAVENOUS ONCE
Status: COMPLETED | OUTPATIENT
Start: 2024-11-22 | End: 2024-11-22

## 2024-11-22 RX ORDER — KETOROLAC TROMETHAMINE 30 MG/ML
INJECTION, SOLUTION INTRAMUSCULAR; INTRAVENOUS AS NEEDED
Status: DISCONTINUED | OUTPATIENT
Start: 2024-11-22 | End: 2024-11-22 | Stop reason: SURG

## 2024-11-22 RX ORDER — SODIUM CHLORIDE, SODIUM LACTATE, POTASSIUM CHLORIDE, CALCIUM CHLORIDE 600; 310; 30; 20 MG/100ML; MG/100ML; MG/100ML; MG/100ML
INJECTION, SOLUTION INTRAVENOUS CONTINUOUS
Status: DISCONTINUED | OUTPATIENT
Start: 2024-11-22 | End: 2024-11-22

## 2024-11-22 RX ORDER — HYDROMORPHONE HYDROCHLORIDE 1 MG/ML
0.4 INJECTION, SOLUTION INTRAMUSCULAR; INTRAVENOUS; SUBCUTANEOUS EVERY 5 MIN PRN
Status: DISCONTINUED | OUTPATIENT
Start: 2024-11-22 | End: 2024-11-22

## 2024-11-22 RX ORDER — ACETAMINOPHEN 500 MG
1000 TABLET ORAL ONCE
Status: COMPLETED | OUTPATIENT
Start: 2024-11-22 | End: 2024-11-22

## 2024-11-22 RX ORDER — NALOXONE HYDROCHLORIDE 0.4 MG/ML
0.08 INJECTION, SOLUTION INTRAMUSCULAR; INTRAVENOUS; SUBCUTANEOUS AS NEEDED
Status: DISCONTINUED | OUTPATIENT
Start: 2024-11-22 | End: 2024-11-22

## 2024-11-22 RX ORDER — HYDROCODONE BITARTRATE AND ACETAMINOPHEN 7.5; 325 MG/1; MG/1
1 TABLET ORAL EVERY 4 HOURS PRN
Status: DISCONTINUED | OUTPATIENT
Start: 2024-11-22 | End: 2024-11-22

## 2024-11-22 RX ORDER — FAMOTIDINE 10 MG/ML
20 INJECTION, SOLUTION INTRAVENOUS ONCE
Status: COMPLETED | OUTPATIENT
Start: 2024-11-22 | End: 2024-11-22

## 2024-11-22 RX ORDER — NICOTINE POLACRILEX 4 MG
15 LOZENGE BUCCAL
Status: DISCONTINUED | OUTPATIENT
Start: 2024-11-22 | End: 2024-11-22

## 2024-11-22 RX ORDER — ONDANSETRON 2 MG/ML
4 INJECTION INTRAMUSCULAR; INTRAVENOUS EVERY 6 HOURS PRN
Status: DISCONTINUED | OUTPATIENT
Start: 2024-11-22 | End: 2024-11-22

## 2024-11-22 RX ORDER — MORPHINE SULFATE 10 MG/ML
6 INJECTION, SOLUTION INTRAMUSCULAR; INTRAVENOUS EVERY 10 MIN PRN
Status: DISCONTINUED | OUTPATIENT
Start: 2024-11-22 | End: 2024-11-22

## 2024-11-22 RX ORDER — MORPHINE SULFATE 4 MG/ML
2 INJECTION, SOLUTION INTRAMUSCULAR; INTRAVENOUS EVERY 10 MIN PRN
Status: DISCONTINUED | OUTPATIENT
Start: 2024-11-22 | End: 2024-11-22

## 2024-11-22 RX ORDER — NICOTINE POLACRILEX 4 MG
30 LOZENGE BUCCAL
Status: DISCONTINUED | OUTPATIENT
Start: 2024-11-22 | End: 2024-11-22

## 2024-11-22 RX ORDER — LIDOCAINE HYDROCHLORIDE 10 MG/ML
INJECTION, SOLUTION EPIDURAL; INFILTRATION; INTRACAUDAL; PERINEURAL AS NEEDED
Status: DISCONTINUED | OUTPATIENT
Start: 2024-11-22 | End: 2024-11-22 | Stop reason: SURG

## 2024-11-22 RX ORDER — PROCHLORPERAZINE EDISYLATE 5 MG/ML
5 INJECTION INTRAMUSCULAR; INTRAVENOUS EVERY 8 HOURS PRN
Status: DISCONTINUED | OUTPATIENT
Start: 2024-11-22 | End: 2024-11-22

## 2024-11-22 RX ORDER — MIDAZOLAM HYDROCHLORIDE 1 MG/ML
INJECTION INTRAMUSCULAR; INTRAVENOUS AS NEEDED
Status: DISCONTINUED | OUTPATIENT
Start: 2024-11-22 | End: 2024-11-22 | Stop reason: SURG

## 2024-11-22 RX ADMIN — KETOROLAC TROMETHAMINE 30 MG: 30 INJECTION, SOLUTION INTRAMUSCULAR; INTRAVENOUS at 15:16:00

## 2024-11-22 RX ADMIN — MIDAZOLAM HYDROCHLORIDE 2 MG: 1 INJECTION INTRAMUSCULAR; INTRAVENOUS at 14:00:00

## 2024-11-22 RX ADMIN — LIDOCAINE HYDROCHLORIDE 50 MG: 10 INJECTION, SOLUTION EPIDURAL; INFILTRATION; INTRACAUDAL; PERINEURAL at 14:05:00

## 2024-11-22 RX ADMIN — DEXAMETHASONE SODIUM PHOSPHATE 4 MG: 4 MG/ML VIAL (ML) INJECTION at 14:13:00

## 2024-11-22 RX ADMIN — ONDANSETRON 4 MG: 2 INJECTION INTRAMUSCULAR; INTRAVENOUS at 14:13:00

## 2024-11-22 RX ADMIN — SODIUM CHLORIDE, SODIUM LACTATE, POTASSIUM CHLORIDE, CALCIUM CHLORIDE: 600; 310; 30; 20 INJECTION, SOLUTION INTRAVENOUS at 14:00:00

## 2024-11-22 NOTE — BRIEF OP NOTE
Pre-Operative Diagnosis: Trigger little finger of left hand [M65.352]  Trigger middle finger of left hand [M65.332]  Carpal tunnel syndrome on left [G56.02]     Post-Operative Diagnosis: Trigger little finger of left hand [M65.352]Trigger middle finger of left hand [M65.332]Carpal tunnel syndrome on left [G56.02]      Procedure Performed:   Left endoscopic carpal tunnel release, left small finger and left middle finger trigger release    Surgeons and Role:     * Barber Sebastian MD - Primary    Assistant(s):        Surgical Findings: CTS and triggers      Specimen: None     Estimated Blood Loss:0 ml    Dictation Number:      Barber Harrison MD  11/22/2024  3:23 PM

## 2024-11-22 NOTE — ANESTHESIA PREPROCEDURE EVALUATION
Anesthesia PreOp Note    HPI:     Valeria Telles is a 64 year old female who presents for preoperative consultation requested by: Barber Sebastian MD    Date of Surgery: 11/22/2024    Procedure(s):  Left endoscopic carpal tunnel release, left small finger and left middle finger trigger release  FINGER TRIGGER RELEASE  Indication: Trigger little finger of left hand [M65.352]  Trigger middle finger of left hand [M65.332]  Carpal tunnel syndrome on left [G56.02]    Relevant Problems   No relevant active problems       NPO:  Last Liquid Consumption Date: 11/21/24  Last Liquid Consumption Time: 1900  Last Solid Consumption Date: 11/21/24  Last Solid Consumption Time: 1700  Last Liquid Consumption Date: 11/21/24          History Review:  Patient Active Problem List    Diagnosis Date Noted    Depressive disorder 08/07/2023    Anxiety 08/07/2023    Osteoarthritis of fingers of both hands 09/11/2019    Trigger ring finger of left hand 09/11/2019    Bilateral carpal tunnel syndrome 09/11/2019    Encounter for therapeutic drug monitoring 11/19/2018    Hypercholesterolemia with hypertriglyceridemia 10/25/2018    Obesity (BMI 30-39.9) 10/25/2018    Controlled type 2 diabetes mellitus without complication, without long-term current use of insulin (HCC) 09/21/2018    Age-related nuclear cataract of both eyes 09/21/2018    Presbyopia 09/21/2018    Calcific tendinitis of right shoulder 01/22/2018    Uncontrolled type 2 diabetes mellitus with hyperglycemia, without long-term current use of insulin (HCC) 01/22/2018    Post-menopausal bleeding 01/22/2018    Urinary, incontinence, stress female 01/22/2018    Polyp of colon 11/28/2017    History of diverticulosis 11/28/2017    Essential hypertension 11/28/2017    Morbid obesity due to excess calories (HCC) 11/28/2017    Swelling, mass, or lump in head and neck 02/11/2014       Past Medical History:    Anesthesia complication    Anesthesia complication    patient states she wakes up  crying    Diabetes (HCC)    Disorder of thyroid    DM2 (diabetes mellitus, type 2) (HCC)    Essential hypertension    High blood pressure    High cholesterol    Hx of motion sickness    Hyperlipidemia    Migraines    Obesity (BMI 30-39.9)    PONV (postoperative nausea and vomiting)       Past Surgical History:   Procedure Laterality Date          Colonoscopy N/A 2021    Procedure: COLONOSCOPY;  Surgeon: Juan Johnson MD;  Location: [facility] ENDOSCOPY    Dilation/curettage,diagnostic      Reduction left          Reduction right             Prescriptions Prior to Admission[1]  Current Medications and Prescriptions Ordered in Epic[2]    Allergies[3]    Family History   Problem Relation Age of Onset    Cancer Mother         Cancer - liver    Other (Other) Father         alzheimers    Diabetes Neg     Glaucoma Neg      Social History     Socioeconomic History    Marital status:    Tobacco Use    Smoking status: Never    Smokeless tobacco: Never   Vaping Use    Vaping status: Never Used   Substance and Sexual Activity    Alcohol use: Yes     Comment: Socially    Drug use: No   Other Topics Concern    Caffeine Concern Yes     Comment: Coffee, 1 cup daily    Exercise No       Available pre-op labs reviewed.  Lab Results   Component Value Date    WBC 8.1 10/10/2024    RBC 4.72 10/10/2024    HGB 14.0 10/10/2024    HCT 41.9 10/10/2024    MCV 88.8 10/10/2024    MCH 29.7 10/10/2024    MCHC 33.4 10/10/2024    RDW 13.2 10/10/2024    .0 10/10/2024     Lab Results   Component Value Date     10/10/2024    K 4.1 10/10/2024     10/10/2024    CO2 31.0 10/10/2024    BUN 10 10/10/2024    CREATSERUM 0.78 10/10/2024    GLU 79 10/10/2024    PGLU 112 (H) 2024    CA 9.9 10/10/2024          Vital Signs:  Body mass index is 31.1 kg/m².   height is 1.499 m (4' 11\") and weight is 69.9 kg (154 lb). Her oral temperature is 97.7 °F (36.5 °C). Her blood pressure is 118/66 and her pulse is  67. Her respiration is 18 and oxygen saturation is 97%.   Vitals:    11/18/24 1607 11/22/24 1053   BP:  118/66   Pulse:  67   Resp:  18   Temp:  97.7 °F (36.5 °C)   TempSrc:  Oral   SpO2:  97%   Weight: 69.9 kg (154 lb) 69.9 kg (154 lb)   Height: 1.499 m (4' 11\") 1.499 m (4' 11\")        Anesthesia Evaluation      No history of anesthetic complications   Airway   Mallampati: III  TM distance: >3 FB  Neck ROM: full  Dental      Pulmonary - normal exam   Cardiovascular - normal exam  Exercise tolerance: good  (+) hypertension    Neuro/Psych      GI/Hepatic/Renal      Endo/Other    (+) diabetes mellitus, hypothyroidism  Abdominal   (+) obese                 Anesthesia Plan:   ASA:  2  Plan:   General  Airway:  LMA  Informed Consent Plan and Risks Discussed With:  Patient  Discussed plan with:  CRNA      I have informed Valeria Telles and/or legal guardian or family member of the nature of the anesthetic plan, benefits, risks including possible dental damage if relevant, major complications, and any alternative forms of anesthetic management.   All of the patient's questions were answered to the best of my ability. The patient desires the anesthetic management as planned.  Gil Higgins DO  11/22/2024 12:58 PM  Present on Admission:  **None**           [1]   Medications Prior to Admission   Medication Sig Dispense Refill Last Dose/Taking    HYDROcodone-acetaminophen 7.5-325 MG Oral Tab Take 1 tablet by mouth every 4 to 6 hours as needed for Pain. 10 tablet 0 Taking As Needed    metFORMIN  MG Oral Tablet 24 Hr Take 2 tablets (1,000 mg total) by mouth daily with breakfast AND 1 tablet (500 mg total) daily with dinner. (Patient taking differently: Take 2 tablets (1,000 mg total) by mouth daily with breakfast ) 270 tablet 3 11/21/2024 at  7:00 AM    lisinopril 5 MG Oral Tab Take 1 tablet (5 mg total) by mouth daily. 90 tablet 3 11/21/2024 at  7:00 AM    levothyroxine 25 MCG Oral Tab Take 1 tablet (25 mcg total) by mouth  before breakfast. 90 tablet 3 11/22/2024 at  7:00 AM    hydroCHLOROthiazide 12.5 MG Oral Tab Take 1 tablet (12.5 mg total) by mouth daily. 90 tablet 3 11/21/2024 at  7:00 AM    atorvastatin 20 MG Oral Tab Take 1 tablet (20 mg total) by mouth nightly. 90 tablet 3 11/21/2024 at  7:00 AM    Insulin Pen Needle (BD PEN NEEDLE MAG U/F) 32G X 4 MM Does not apply Misc USE A NEW NEEDLE WITH EACH USE 1 Box 1     Blood Gluc Meter Disp-Strips Does not apply Device Needs 1 glucometer along with 100 test strips and 100 lancets with 11 refills each. Check sugars BID and prn. 1 Device 0    [2]   Current Facility-Administered Medications Ordered in Epic   Medication Dose Route Frequency Provider Last Rate Last Admin    lactated ringers infusion   Intravenous Continuous Barber Sebastian MD 20 mL/hr at 11/22/24 1053 New Bag at 11/22/24 1053     No current [facility]-ordered outpatient medications on file.   [3]   Allergies  Allergen Reactions    Iodine (Topical) RASH    Radiology Contrast Iodinated Dyes RASH

## 2024-11-22 NOTE — ANESTHESIA PROCEDURE NOTES
Airway  Date/Time: 11/22/2024 2:07 PM  Urgency: Elective    Airway not difficult    General Information and Staff    Patient location during procedure: OR  Anesthesiologist: Gil Higgins DO  Resident/CRNA: Makenna Doll CRNA  Performed: CRNA   Performed by: Makenna Doll CRNA  Authorized by: Gil Higgins DO      Indications and Patient Condition  Indications for airway management: anesthesia  Spontaneous ventilation: present  Sedation level: deep  Preoxygenated: yes  Patient position: sniffing  Mask difficulty assessment: 1 - vent by mask    Final Airway Details  Final airway type: supraglottic airway      Successful airway: classic  Size 4       Number of attempts at approach: 1

## 2024-11-22 NOTE — ANESTHESIA POSTPROCEDURE EVALUATION
Patient: Valeria Telles    Procedure Summary       Date: 11/22/24 Room / Location: [facility] MAIN OR  / [facility] MAIN OR    Anesthesia Start: 1400 Anesthesia Stop: 1528    Procedures:       Left endoscopic carpal tunnel release, left small finger and left middle finger trigger release (Left)      FINGER TRIGGER RELEASE (Left) Diagnosis:       Trigger little finger of left hand      Trigger middle finger of left hand      Carpal tunnel syndrome on left      (Trigger little finger of left hand [M65.352]Trigger middle finger of left hand [M65.332]Carpal tunnel syndrome on left [G56.02])    Surgeons: Barber Sebastian MD Anesthesiologist: Nando Cheema MD    Anesthesia Type: general ASA Status: 2            Anesthesia Type: general    Vitals Value Taken Time   /101 11/22/24 1528   Temp 97 °F (36.1 °C) 11/22/24 1528   Pulse 76 11/22/24 1528   Resp 18 11/22/24 1528   SpO2 98 % 11/22/24 1528       [facility] AN Post Evaluation:   Patient Evaluated in PACU  Patient Participation: complete - patient participated  Level of Consciousness: awake and alert  Pain Score: 0  Pain Management: adequate  Airway Patency:patent  Yes    Nausea/Vomiting: none  Cardiovascular Status: acceptable  Respiratory Status: acceptable  Postoperative Hydration acceptable      Mackenzie Whitman CRNA  11/22/2024 3:28 PM

## 2024-11-22 NOTE — INTERVAL H&P NOTE
Pre-op Diagnosis: Trigger little finger of left hand [M65.352]  Trigger middle finger of left hand [M65.332]  Carpal tunnel syndrome on left [G56.02]    The above referenced H&P was reviewed by Barber Harrison MD on 11/22/2024, the patient was examined and no significant changes have occurred in the patient's condition since the H&P was performed.  I discussed with the patient and/or legal representative the potential benefits, risks and side effects of this procedure; the likelihood of the patient achieving goals; and potential problems that might occur during recuperation.  I discussed reasonable alternatives to the procedure, including risks, benefits and side effects related to the alternatives and risks related to not receiving this procedure.  We will proceed with procedure as planned.

## 2024-11-23 ENCOUNTER — TELEPHONE (OUTPATIENT)
Dept: SURGERY | Facility: CLINIC | Age: 64
End: 2024-11-23

## 2024-11-23 NOTE — TELEPHONE ENCOUNTER
Spoke w/the patient and she states she has intermittent \"pinching\" feeling, rates pain and is taking Norco, but she vomited last night after taking the Norco.  Patient instructed to stick to a bland diet, drink fluids and stop taking the norco.   Patient reminded to keep dressing clean and dry, to keep arm in sling at all times.  Patient reminded of RN appt on 12/4, OT appt on 11/25 , and MD appt on 12/12.  Patient verbalized an understanding.  Patient instructed to call the office w/any other questions and/or concerns.  Dr. Harrison notified.

## 2024-11-23 NOTE — OPERATIVE REPORT
[facility]    PATIENT'S NAME: VERNELL BAEZA   ATTENDING PHYSICIAN: Barber Harrison MD   OPERATING PHYSICIAN: Barber Harrison MD   PATIENT ACCOUNT#:   [id number]    LOCATION:  [facility]  MEDICAL RECORD #:   [id number]       YOB: 1960  ADMISSION DATE:       11/22/2024      OPERATION DATE:  11/22/2024    OPERATIVE REPORT    PREOPERATIVE DIAGNOSIS:  Left carpal tunnel syndrome, left middle finger trigger, left small finger trigger.  POSTOPERATIVE DIAGNOSIS:  Left carpal tunnel syndrome, left middle finger trigger, left small finger trigger.  PROCEDURE:  Left endoscopic carpal tunnel release, left middle finger trigger release, left small finger trigger release.    INDICATIONS:  A 64-year-old female, right-hand dominant, with worsening left carpal tunnel symptoms.  She has intermittent numbness of the middle, ring, and small.  She has night symptoms as well as weakness.  She developed triggering and locking with minimal pain of the left middle and small fingers.  She is admitted to the operating amphitheater for left endoscopic carpal tunnel release, left middle finger trigger release, and left small finger trigger release.    FINDINGS:  The left hand has full range of motion but with triggering and locking of the left middle and small fingers.  Thenar intrinsics are intact and symmetric.  Tinel's is positive for the left middle and left ring fingers.    OPERATIVE TECHNIQUE:  Patient was placed under general anesthesia.  Hand and forearm were prepped and draped in the usual sterile fashion.  A pneumatic tourniquet was inflated to 250 mmHg.    Proximal and distal incisions were made.  The proximal incision was extended to the antebrachial fascia which was incised longitudinally 3 cm proximal to the proximal incision.  A curved dissector was placed deep to the antebrachial fascia and in continuity with the transverse carpal ligament.  The slotted cannula and endoscope  were placed.  We had excellent visualization of the dorsal surface of the transverse carpal ligament.  Complete release of the transverse carpal ligament was accomplished with a push knife, a triangle knife, and a pull knife.  We had excellent herniation of palmar fat into the cannula.  The cannula and endoscope were withdrawn.  A curved dissector was used to document complete release of the fascia proximally and the transverse carpal ligament distally.  Skin edges were reapproximated with 4-0 nylon.    A zigzag incision was made over the first annular ligament of the left middle finger.  We dissected the first annular ligament from surrounding structures, then incised it longitudinally through its length.  We had full excursion of the flexor tendons without crepitation, triggering, or locking.  Skin edges were reapproximated with 4-0 nylon.    A zigzag incision was made over the left small finger first annular ligament.  We dissected out the first annular ligament, then divided it through its length.  We had full excursion of the flexor tendons without crepitation, triggering, or locking.  Skin edges were reapproximated with 4-0 nylon.    A soft dressing was placed.    The tourniquet was released.  Total tourniquet time 47 minutes.    The patient tolerated the procedure well and left the operating suite in satisfactory condition.    Dictated By Barber Harrison MD  d: 11/22/2024 15:26:12  t: 11/23/2024 01:54:40  [facility] [id number]  J/    cc: MD Ralph Chrery DO

## 2024-11-24 NOTE — TELEPHONE ENCOUNTER
November 24, 2024  Me   AM    11/24/24  2:19 PM  Note     [employer] forms received and placed in archive     Forms being processed in TE 11/21/24

## 2024-11-24 NOTE — TELEPHONE ENCOUNTER
November 19, 2024  Ambreen Ontiveros MA    11/19/24  8:37 AM  Note     Received disability forms in the forms department via e-mail. No Authorization for Release of Information given, Sent [id number] message for Release of Information authorization. Logged for processing.

## 2024-11-25 ENCOUNTER — OFFICE VISIT (OUTPATIENT)
Dept: SURGERY | Facility: CLINIC | Age: 64
End: 2024-11-25
Payer: COMMERCIAL

## 2024-11-25 DIAGNOSIS — M62.81 DISTAL MUSCLE WEAKNESS: Primary | ICD-10-CM

## 2024-11-25 DIAGNOSIS — M25.642 STIFFNESS OF JOINT, HAND, LEFT: ICD-10-CM

## 2024-11-25 NOTE — PROGRESS NOTES
Carpal Tunnel Post - Op Note:    Subjective: LECTR:  LMF, LSF: trigger releases      Objective:  Occupational Therapy completed the following educational areas status post elective carpal tunnel procedure:    1)  Dressing was removed and handwashing technique was reviewed using anti bacterial soap.    2)  Dressing was changed using x 2 cloth band aids and polysporin, gauze and spandage.    3)  Home exercise program reviewed and written handout provided for further reference:     A)   Tendon gliding exercises, x 10 reps per set, x 8 sets per day.     B)   AROM:   Full fisting exercises, x 20 reps per set, x 5 sets per day.     C)   Wrist flexion and extension exercises, x 20 reps per set, x 5 sets per day.    Note:  Instructed patient that exercises should not be painful.      Assessment: Patient verbalized and demonstrated independence with prescribed home exercise program and dressing change technique.    Plan: Patient will return to the clinic for suture removal as indicated by protocol.      Rogelio White  OTR/L

## 2024-11-25 NOTE — TELEPHONE ENCOUNTER
Faxed Work Status from 11/22 to [employer] [facility] per pt request, fax [phone].  Fax confirmation received.  Pt informed fax was sent during OT appt, pt verbalized understanding.

## 2024-11-26 NOTE — TELEPHONE ENCOUNTER
Dr. Harrison       Please sign off on form if you agree to: Disability  Start: 11/22/24  End: Pending re eval 12/12/24  (place your signature on the first page only)    -From your Inbasket, Highlight the patient and click Chart   -Double click the 11/21/24 Forms Completion telephone encounter  -Scroll down to the Media section   -Click the blue Hyperlink: Disab Dr Harrison 11/26/24  -Click Acknowledge located in the top right ribbon/menu   -Drag the mouse into the blank space of the document and a + sign will appear. Left click to   electronically sign the document.     Thank you,     Kiera

## 2024-12-04 ENCOUNTER — APPOINTMENT (OUTPATIENT)
Dept: SURGERY | Facility: CLINIC | Age: 64
End: 2024-12-04

## 2024-12-04 ENCOUNTER — NURSE ONLY (OUTPATIENT)
Dept: SURGERY | Facility: CLINIC | Age: 64
End: 2024-12-04

## 2024-12-04 DIAGNOSIS — G56.02 CARPAL TUNNEL SYNDROME ON LEFT: ICD-10-CM

## 2024-12-04 DIAGNOSIS — M25.642 STIFFNESS OF JOINT, HAND, LEFT: ICD-10-CM

## 2024-12-04 DIAGNOSIS — M62.81 DISTAL MUSCLE WEAKNESS: Primary | ICD-10-CM

## 2024-12-04 DIAGNOSIS — Z48.02 VISIT FOR SUTURE REMOVAL: Primary | ICD-10-CM

## 2024-12-04 DIAGNOSIS — M65.332 TRIGGER MIDDLE FINGER OF LEFT HAND: ICD-10-CM

## 2024-12-04 DIAGNOSIS — M65.352 TRIGGER LITTLE FINGER OF LEFT HAND: ICD-10-CM

## 2024-12-04 PROCEDURE — 97110 THERAPEUTIC EXERCISES: CPT | Performed by: OCCUPATIONAL THERAPIST

## 2024-12-04 PROCEDURE — 97165 OT EVAL LOW COMPLEX 30 MIN: CPT | Performed by: OCCUPATIONAL THERAPIST

## 2024-12-04 NOTE — PROGRESS NOTES
Surgery 1: RICHAR LOCKE/Moe  - Date: 24  - Days Since: 12   Pt is in the office today for a Nurse visit for suture removal LH then OT  Pt identified by name and .  Orders reviewed.  Denies pain, numbness,tingling and need for analgesics.  C/o pruritus volar distal LH  Washing incisions daily with soap and water then applying A&D ointment,gauze and spandage.  Incisions with sutures CDI,edges well approximated without s/s of infection.  Volar distal hand with edema and erythema around sutures.  No rash.  Sutures removed without difficulty.  Pt tolerated well.  Pt washed scars at the sink with soap and water then escorted to OT  Reminded next appointment 24 with OT/MD, do not put any ointments on incisions other then our instructed treatments and please call the office with any other questions and/or concerns.  Verbalized understanding.  Dr Harrison notified.    Pruritus, erythema and edema, distal volar hand surrounding incisions   No s/s of infection.  Pt had been using A&D ointment on incisions rather then polysporin, emphasized importance of only applying treatments we instruct her to, verbalized understanding.  Sutures removed.  To OT  24 JUNIOR/MD

## 2024-12-05 NOTE — PROGRESS NOTES
OCCUPATIONAL THERAPY EVALUATION:   Valeria Telles   [id number]       SUBJECTIVE:    HX of Injury: Left hand pain and numbness. LMF. LSF triggering.  Chief Complaint:  Left hand is itching..    Precautions:  2 # lifting restriction with the left hand.  Premorbid Functional Status: Independent w/ Occ. duties, Independent w/ driving / sitting, Independent w/ ADL's  Current Level of Function: 2 # lifting restriction with the left hand.  Employment: Temporary leave  Hand Dominance: right  Living Situation: Family  Barriers to Learning: None  Patient Goals: Full use of the left hand.    Imaging/Tests: EMG        OBJECTIVE DATA:   PAIN:   Rating (1/10): 1/10 at rest, 2/10 with activity  Location:     OBSERVATION:   Guarding movements    SCAR/INCISION: Flat, Non-adhered, and Flexible    SENSORY:  Improved left hand digit sensation distally, status post LECTR      AROM/PROM:  (Degrees)  LEFT HAND:    Thumb IF MF RF SF   MP Near full left hand composite fist.       PIP        DIP        CHISHOLM                  STRENGTH: (lbs) Right Average Left Average   : NT NT   2 pt Pinch:     3 pt Pinch:     Lateral Pinch:         ASSESSMENT & PLAN OF CARE:    Treatment Provided: Patient was seen for an initial evaluation, hand washing,:Following suture removal by nursing, OT reviewed cold cream scar massage technique.  HEP:  AROM, Tendon glides, x 20 reps per set, x 5 sets daily. Reviewed hand elevation importance. Written handout was provided to reinforce today's treatment and educational session.       Rehabilitation Potential: Good    CLINICAL ASSESSMENT:    Patient/Caregiver Education Provided: Yes    Treatment Plan:  Therapeutic Exercise  Therapeutic Activities  Modalities  Manual Therapy  Scar Management  Patient/Family Education    GOALS:  Short term goals to be reached in x 1 week:    1) Independent with HEP..    Long term goals to be reached in x 3 weeks:    1) Full functional use of the involved extremity for self-care, leisure  and work related tasks:.        Patient will be seen 1 x /week for 3 weeks or a total of 3 visits.   Pt. was advised regarding the findings of this evaluation and agrees to the plan of care.     BLANE MeierL

## 2024-12-09 DIAGNOSIS — E11.9 DIABETES MELLITUS WITHOUT COMPLICATION (HCC): Primary | ICD-10-CM

## 2024-12-12 ENCOUNTER — OFFICE VISIT (OUTPATIENT)
Dept: SURGERY | Facility: CLINIC | Age: 64
End: 2024-12-12

## 2024-12-12 ENCOUNTER — TELEPHONE (OUTPATIENT)
Dept: SURGERY | Facility: CLINIC | Age: 64
End: 2024-12-12

## 2024-12-12 DIAGNOSIS — M65.352 TRIGGER LITTLE FINGER OF LEFT HAND: ICD-10-CM

## 2024-12-12 DIAGNOSIS — G56.02 CARPAL TUNNEL SYNDROME ON LEFT: Primary | ICD-10-CM

## 2024-12-12 DIAGNOSIS — M65.332 TRIGGER MIDDLE FINGER OF LEFT HAND: ICD-10-CM

## 2024-12-12 DIAGNOSIS — M62.81 DISTAL MUSCLE WEAKNESS: Primary | ICD-10-CM

## 2024-12-12 DIAGNOSIS — M25.642 STIFFNESS OF JOINT, HAND, LEFT: ICD-10-CM

## 2024-12-12 PROCEDURE — 99024 POSTOP FOLLOW-UP VISIT: CPT | Performed by: PLASTIC SURGERY

## 2024-12-12 PROCEDURE — 97110 THERAPEUTIC EXERCISES: CPT | Performed by: OCCUPATIONAL THERAPIST

## 2024-12-12 NOTE — PROGRESS NOTES
Surgery 1: RICHAR LOCKE/Moe  - Date: 11/22/24  - Days Since: 20    20 days postop  No complaints.  No pain.    She has stiffness    She has been using the hand to wash dishes and ring washcloths out    Numbness is gone    Some limitation of flexion  Scar is healing well  No triggering  Strength 20 versus 40  Moderate edema    She has done too much too soon and we discussed limiting her activities until instructed to    Continue OT, ultrasound, strengthening, endurance    She needs the right hand done but she wants to wait next year    She will call for preoperative appoint.

## 2024-12-12 NOTE — PROGRESS NOTES
Subjective: My left hand is weak.      Objective:     Current level of performance:  ADL: Independent  Work: 2 # lifting restriction with the left hand  Leisure: Family    Measurements/Tests:  ROM:  Testing By: jc   Strength Right: 40 #      Strength Left: 20 #      Treatment Provided this day: Up dated bilateral hand strength measurements: Physician follow-up. Reviewed therapeutic exercise regime:  AROM:   X 20 reps per set, x 5 sets daily:    Tendon glides:  X 10 reps per set, x 8 sets daily:    PROM:  To all digits:  X 15 reps each digit per set, x 5 sets daily\"    Treatment Time: 20 minutes      Summary/Analysis of Treatment session: Progressing well with OT goals and objectives.      Plan: To return to full work duty 12/30/2024.      Follow up in:  x 1 week.          Rogelio Macias  OTR/L

## 2024-12-12 NOTE — TELEPHONE ENCOUNTER
Patient contacted [employer]  requesting extension of disability, states it is due today.  [employer] sent to Release of Information, called patient to inform them of need for Release of Information in order to fax revision.  Patient acknowledged.  This rep will revise form.

## 2024-12-13 DIAGNOSIS — E78.2 MIXED HYPERLIPIDEMIA: ICD-10-CM

## 2024-12-17 NOTE — TELEPHONE ENCOUNTER
Duplicate Disability form received.  Patient had revision done to be off until 12/30/24.  Archived.

## 2024-12-18 NOTE — TELEPHONE ENCOUNTER
Please Review. Protocol Failed; No Protocol   Lab Results   Component Value Date     ALT 29 08/26/2023     Requested Prescriptions   Pending Prescriptions Disp Refills    ATORVASTATIN 20 MG Oral Tab [Pharmacy Med Name: ATORVASTATIN 20 MG TABLET] 90 tablet 3     Sig: TAKE ONE TABLET BY MOUTH ONCE NIGHTLY       Cholesterol Medication Protocol Failed - 12/18/2024 10:31 AM        Failed - ALT < 80     Lab Results   Component Value Date    ALT 29 08/26/2023             Failed - ALT resulted within past year        Failed - Lipid panel within past 12 months     Lab Results   Component Value Date    CHOLEST 132 08/26/2023    TRIG 104 08/26/2023    HDL 41 08/26/2023    LDL 72 08/26/2023    VLDL 16 08/26/2023    TCHDLRATIO 2.9 04/01/2020    NONHDLC 91 08/26/2023             Passed - In person appointment or virtual visit in the past 12 mos or appointment in next 3 mos     Recent Outpatient Visits              6 days ago Carpal tunnel syndrome on left    [facility], Barber Hammer MD    Office Visit    6 days ago Distal muscle weakness    [facility], Rogelio Becerra OTRL    Office Visit    2 weeks ago [facility]    [facility] Only    3 weeks ago Distal muscle weakness    [facility], [facility]    Office Visit    1 month ago Bilateral carpal tunnel syndrome    [facility]    Office Visit          Future Appointments         Provider Department Appt Notes    In 5 days [facility] White [facility]                           Future Appointments         Provider Department Appt Notes    In 5 days [facility] 2[facility]          Recent Outpatient Visits               6 days ago Carpal tunnel syndrome on left    [facility], Barber Hammer MD    Office Visit    6 days ago Distal muscle weakness    [facility], Rogelio Becerra OTRL    Office Visit    2 weeks ago Visit for suture removal    [facility], [place]    Nurse Only    3 weeks ago Distal muscle weakness    [facility], Rogelio Becerra OTRL    Office Visit    1 month ago Bilateral carpal tunnel syndrome    [facility] [place]    Office Visit

## 2024-12-19 RX ORDER — ATORVASTATIN CALCIUM 20 MG/1
20 TABLET, FILM COATED ORAL NIGHTLY
Qty: 90 TABLET | Refills: 3 | Status: SHIPPED | OUTPATIENT
Start: 2024-12-19

## 2024-12-23 ENCOUNTER — OFFICE VISIT (OUTPATIENT)
Dept: SURGERY | Facility: CLINIC | Age: 64
End: 2024-12-23

## 2024-12-23 DIAGNOSIS — M25.642 STIFFNESS OF JOINT, HAND, LEFT: ICD-10-CM

## 2024-12-23 DIAGNOSIS — M62.81 DISTAL MUSCLE WEAKNESS: Primary | ICD-10-CM

## 2024-12-23 PROCEDURE — 97035 APP MDLTY 1+ULTRASOUND EA 15: CPT | Performed by: OCCUPATIONAL THERAPIST

## 2024-12-23 PROCEDURE — 97022 WHIRLPOOL THERAPY: CPT | Performed by: OCCUPATIONAL THERAPIST

## 2024-12-23 NOTE — PROGRESS NOTES
Subjective: My left hand feels much better.      Objective:     Current level of performance:  ADL: Independent  Work: Returning to full work duty status 12/30/2024.  Leisure: Family.    Measurements/Tests:  ROM:         Full left hand composite fist.         Treatment Provided this day: Fluidotherapy to the left wrist and hand: To increase active range of motion, reduce joint stiffness, as well as decrease scar sensitivity, for increased functional use of the involved extremity, during self care, work and or leisure time tasks. Ultrasound the left hand palmar surface scar sites: 8 min. Continuous 3.3 mhz w/cm squared. To reduce edema, increase circulation, soften scar tissue, for increased range of motion and reduction of pain.  HEP:    Treatment Time: 30 minutes      Summary/Analysis of Treatment session: No further OT needs at this time.      Plan: Discontinue OT      Follow up in:  To call with questions and or concerns.          Rogelio Macias  OTR/L

## 2025-02-10 ENCOUNTER — OFFICE VISIT (OUTPATIENT)
Dept: FAMILY MEDICINE CLINIC | Facility: CLINIC | Age: 65
End: 2025-02-10
Payer: COMMERCIAL

## 2025-02-10 ENCOUNTER — HOSPITAL ENCOUNTER (OUTPATIENT)
Dept: GENERAL RADIOLOGY | Age: 65
Discharge: HOME | End: 2025-02-10
Attending: FAMILY MEDICINE
Payer: COMMERCIAL

## 2025-02-10 VITALS
BODY MASS INDEX: 32.7 KG/M2 | DIASTOLIC BLOOD PRESSURE: 78 MMHG | SYSTOLIC BLOOD PRESSURE: 112 MMHG | HEART RATE: 76 BPM | HEIGHT: 59 IN | WEIGHT: 162.19 LBS | TEMPERATURE: 98 F

## 2025-02-10 DIAGNOSIS — M16.11 PRIMARY OSTEOARTHRITIS OF RIGHT HIP: ICD-10-CM

## 2025-02-10 DIAGNOSIS — G89.29 CHRONIC RIGHT HIP PAIN: ICD-10-CM

## 2025-02-10 DIAGNOSIS — M70.61 GREATER TROCHANTERIC BURSITIS OF RIGHT HIP: ICD-10-CM

## 2025-02-10 DIAGNOSIS — Z98.890 S/P CARPAL TUNNEL RELEASE: Chronic | ICD-10-CM

## 2025-02-10 DIAGNOSIS — F41.9 ANXIETY: ICD-10-CM

## 2025-02-10 DIAGNOSIS — E11.9 CONTROLLED TYPE 2 DIABETES MELLITUS WITHOUT COMPLICATION, WITHOUT LONG-TERM CURRENT USE OF INSULIN (HCC): ICD-10-CM

## 2025-02-10 DIAGNOSIS — M25.551 CHRONIC RIGHT HIP PAIN: Primary | ICD-10-CM

## 2025-02-10 DIAGNOSIS — R00.2 HEART PALPITATIONS: ICD-10-CM

## 2025-02-10 DIAGNOSIS — M25.551 CHRONIC RIGHT HIP PAIN: ICD-10-CM

## 2025-02-10 DIAGNOSIS — R20.0 NUMBNESS OF LEFT HAND: ICD-10-CM

## 2025-02-10 DIAGNOSIS — R40.4 TRANSIENT ALTERATION OF AWARENESS: ICD-10-CM

## 2025-02-10 DIAGNOSIS — G89.29 CHRONIC RIGHT HIP PAIN: Primary | ICD-10-CM

## 2025-02-10 PROCEDURE — 73502 X-RAY EXAM HIP UNI 2-3 VIEWS: CPT | Performed by: FAMILY MEDICINE

## 2025-02-10 NOTE — PROGRESS NOTES
Patient ID: Valeria Telles is a 64 year old female.    HPI  Chief Complaint   Patient presents with    Hip Pain     Pain has worsen.     Last seen on 10/10/2024.    Pt c/o worsening right hip pain over the last two years. I reviewed XR from April, 2023 which showed moderate arthritis in her bilateral hips, worse on the right than the left. Pain is the worst around her outside right hip. She has consulted with Dr. Griffith, pain specialist, in the past. Pt will have pain with walking and when she tries to sidestep.  Come on and. Her daughter feels she isn't stable enough to  her grandson. When walking up stairs she has to step up with one foot and then the other. She has trouble stepping onto her right foot to exit the passenger side of the car. If she has an active day she has to take Ibuprofen the following day to help with the pain with some relief. I discussed with her.     Pt also states that she continues to have episodes of feeling like she is in \"twilight zone\", followed by sensations of a racing heart, and then tingling in her left hand. SHx recent carpal tunnel surgery on the left.This will last for about 3 seconds, and resolves when she takes deep breathes. Pt wasn't sure if it was related to anxiety but is having another grandchild soon and she helps take care of a grandson with special needs. I discussed with her and referred her to neurology as she did not believe this was anxiety related although it seems most consistent with that.     She has been off of Ozempic for the last two months. Last A1c value was 5.5% done 10/10/2024. Patient denies any chest pain, shortness breath, diaphoresis, dizziness, hypoglycemia, numbness or tingling in the legs. I discussed with her. Pt plans on having carpal tunnel surgery on her right hand as well.  She wanted to check her hemoglobin A1c just to make sure that her diabetes is not out of control and causing the fast heart rate and a feeling of being in the  \"twilight zone\".      Wt Readings from Last 6 Encounters:   02/10/25 162 lb 3.2 oz   24 154 lb   10/10/24 154 lb 12.8 oz   04/15/24 161 lb   23 172 lb 6.4 oz   23 181 lb       BMI Readings from Last 6 Encounters:   02/10/25 32.76 kg/m²   24 31.10 kg/m²   10/10/24 31.27 kg/m²   04/15/24 32.52 kg/m²   23 34.82 kg/m²   23 36.56 kg/m²       BP Readings from Last 6 Encounters:   02/10/25 112/78   24 140/83   10/10/24 107/72   04/15/24 108/70   24 112/72   23 111/74         Review of Systems   Respiratory:  Negative for shortness of breath.    Cardiovascular:  Negative for chest pain.           Medical History:      Past Medical History:    Anesthesia complication    Anesthesia complication    patient states she wakes up crying    Carpal tunnel syndrome, left    Diabetes (HCC)    Disorder of thyroid    DM2 (diabetes mellitus, type 2) (HCC)    Essential hypertension    High blood pressure    High cholesterol    Hx of motion sickness    Hyperlipidemia    Migraines    Obesity (BMI 30-39.9)    PONV (postoperative nausea and vomiting)    Trigger finger, left little finger    Trigger finger, left middle finger       Past Surgical History:   Procedure Laterality Date          Colonoscopy N/A 2021    Procedure: COLONOSCOPY;  Surgeon: Juan Johnson MD;  Location: [facility] [place] ENDOSCOPY    Dilation/curettage,diagnostic      Incise finger tendon sheath Left 2024    Left small finger and Left middle finger trigger releases    Reduction left          Reduction right          Wrist arthroscop,release xvers lig Left 2024    Left endoscopic carpal tunnel release          Current Outpatient Medications   Medication Sig Dispense Refill    atorvastatin 20 MG Oral Tab Take 1 tablet (20 mg total) by mouth nightly. 90 tablet 3    HYDROcodone-acetaminophen 7.5-325 MG Oral Tab Take 1 tablet by mouth every 4 to 6 hours as needed for Pain. 10 tablet 0     metFORMIN  MG Oral Tablet 24 Hr Take 2 tablets (1,000 mg total) by mouth daily with breakfast AND 1 tablet (500 mg total) daily with dinner. 270 tablet 3    lisinopril 5 MG Oral Tab Take 1 tablet (5 mg total) by mouth daily. 90 tablet 3    levothyroxine 25 MCG Oral Tab Take 1 tablet (25 mcg total) by mouth before breakfast. 90 tablet 3    hydroCHLOROthiazide 12.5 MG Oral Tab Take 1 tablet (12.5 mg total) by mouth daily. 90 tablet 3    Blood Gluc Meter Disp-Strips Does not apply Device Needs 1 glucometer along with 100 test strips and 100 lancets with 11 refills each. Check sugars BID and prn. 1 Device 0    Insulin Pen Needle (BD PEN NEEDLE MAG U/F) 32G X 4 MM Does not apply Misc USE A NEW NEEDLE WITH EACH USE (Patient not taking: Reported on 2/10/2025) 1 Box 1     Allergies:Allergies[1]     Physical Exam:       Physical Exam  Blood pressure 112/78, pulse 76, temperature 98.2 °F (36.8 °C), temperature source Tympanic, height 4' 11\" (1.499 m), weight 162 lb 3.2 oz, last menstrual period 09/01/2014, not currently breastfeeding.    Physical Exam   Constitutional: Patient is oriented to person, place, and time. Patient appears well-developed and well-nourished. No distress.   Neck: Normal range of motion. No thyromegaly present.   Cardiovascular: Normal rate, regular rhythm and normal heart sounds.    Pulmonary/Chest: Effort normal and breath sounds normal. No respiratory distress.   Lymphadenopathy: Patient has no cervical adenopathy.  Neurological: Patient is alert and oriented to person, place, and time.   Skin: Skin is warm.   Psychiatry: Normal mood and affect.    Hips: Tenderness over her greater trochanter on the right. Very decreased internal rotation of the right hip with some pain but normal range of motion with external rotation.  No crepitus palpated within the hip.. Good range of motion of the left hip. Normal range of motion with SLR on the right and the left.     Vitals reviewed.            Assessment/Plan:      Diagnoses and all orders for this visit:    Chronic right hip pain  -     XR HIP + PELVIS MIN 4 VIEWS RIGHT (CPT=73503); Future  -     diclofenac 1 % External Gel; Apply 2 g topically 2 (two) times daily as needed. To the right outer hip  Lets try diclofenac gel on the bursa but lets get an x-ray to see if the arthritis has progressed.  Primary osteoarthritis of right hip  -     XR HIP + PELVIS MIN 4 VIEWS RIGHT (CPT=73503); Future  As above  Greater trochanteric bursitis of right hip  -     XR HIP + PELVIS MIN 4 VIEWS RIGHT (CPT=73503); Future  -     diclofenac 1 % External Gel; Apply 2 g topically 2 (two) times daily as needed. To the right outer hip    Anxiety  I do believe the 3 seconds sensation of having altered awareness is anxiety related but I will have her see neurology.  Neurologic exam is completely normal today.  She speaking clearly.  Gait is not ataxic.  Numbness of left hand  -     NEURO - INTERNAL  -     Basic Metabolic Panel (8); Future  Not due to carpal tunnel as she just had the surgery.  She states this happens when she has it is transient alteration of awareness.  Transient alteration of awareness  -     NEURO - INTERNAL    Heart palpitations  -     NEURO - INTERNAL  -     EKG 12 Lead; Future  Last only for a few seconds.  There is no chest pain or trouble breathing or syncope with this  Controlled type 2 diabetes mellitus without complication, without long-term current use of insulin (HCC)  -     Hemoglobin A1C; Future    S/P carpal tunnel release  Per Dr. Harrison.  She will be getting the right wrist done soon as well.      Referrals (if applicable)  Orders Placed This Encounter   Procedures    NEURO - INTERNAL     If he is booked you can try to make an appointment with 1 of his partners and see them instead.     Referral Priority:   Routine     Referral Type:   OFFICE VISIT     Referred to Provider:   Denis Campa MD     Requested Specialty:   NEUROLOGY      Number of Visits Requested:   3       Follow up if symptoms persist.  Take medicine (if given) as prescribed.  Approach to treatment discussed and patient/family member understands and agrees to plan.     No follow-ups on file.    There are no Patient Instructions on file for this visit.    Teresa Arellano    2/10/2025    By signing my name below, ITeresa,  attest that this documentation has been prepared under the direction and in the presence of Ralph Monterroso DO.   Electronically Signed: Teresa Arellano, 2/10/2025, 1:42 PM.    I, Ralph Monterroso DO,  personally performed the services described in this documentation. All medical record entries made by the scribe were at my direction and in my presence.  I have reviewed the chart and discharge instructions (if applicable) and agree that the record reflects my personal performance and is accurate and complete.  Ralph Monterroso DO, 2/10/2025, 2:44 PM                  [1]   Allergies  Allergen Reactions    Iodine (Topical) RASH    Radiology Contrast Iodinated Dyes RASH

## 2025-02-17 DIAGNOSIS — G89.29 CHRONIC RIGHT HIP PAIN: Primary | ICD-10-CM

## 2025-02-17 DIAGNOSIS — M16.0 ARTHRITIS OF BOTH HIPS: Chronic | ICD-10-CM

## 2025-02-17 DIAGNOSIS — M25.551 CHRONIC RIGHT HIP PAIN: Primary | ICD-10-CM

## 2025-03-19 ENCOUNTER — OFFICE VISIT (OUTPATIENT)
Dept: PHYSICAL MEDICINE AND REHAB | Facility: CLINIC | Age: 65
End: 2025-03-19
Payer: COMMERCIAL

## 2025-03-19 ENCOUNTER — TELEPHONE (OUTPATIENT)
Dept: PHYSICAL MEDICINE AND REHAB | Facility: CLINIC | Age: 65
End: 2025-03-19

## 2025-03-19 VITALS — BODY MASS INDEX: 32.66 KG/M2 | WEIGHT: 162 LBS | HEIGHT: 59 IN

## 2025-03-19 DIAGNOSIS — F32.A DEPRESSIVE DISORDER: ICD-10-CM

## 2025-03-19 DIAGNOSIS — M16.11 PRIMARY OSTEOARTHRITIS OF RIGHT HIP: Primary | ICD-10-CM

## 2025-03-19 DIAGNOSIS — E11.9 CONTROLLED TYPE 2 DIABETES MELLITUS WITHOUT COMPLICATION, WITHOUT LONG-TERM CURRENT USE OF INSULIN (HCC): ICD-10-CM

## 2025-03-19 DIAGNOSIS — E66.9 OBESITY (BMI 30-39.9): ICD-10-CM

## 2025-03-19 DIAGNOSIS — F41.9 ANXIETY: ICD-10-CM

## 2025-03-19 PROCEDURE — 3008F BODY MASS INDEX DOCD: CPT | Performed by: PHYSICAL MEDICINE & REHABILITATION

## 2025-03-19 PROCEDURE — 99214 OFFICE O/P EST MOD 30 MIN: CPT | Performed by: PHYSICAL MEDICINE & REHABILITATION

## 2025-03-19 NOTE — PROGRESS NOTES
[facility]  Progress Note    CHIEF COMPLAINT:    Chief Complaint   Patient presents with    New Patient     New R handed pt is here presenting with R hip pain that began years ago and denies injury. Pt was referred by Dr. Monterroso. Pt reports aching and burning pain on anterior and posterior hip. Pain is 8/10. Denies N/T. Admits weakness. XR hip completed 25. No previous injection/surgery. Taking tylenol PRN, diclofenac external gel nightly.        History of Present Illness:  Valeria Telles is a 64 year old female who presents today for follow up for symptoms of bilateral hip and low back pain.  I saw her for an EMG in  showing bilateral carpal tunnel syndrome.  In  I saw her for carpal tunnel symptoms as well.  She recently saw Dr. Monterroso and complained of bilateral hip pain.  He obtained plain film x-rays showing moderate arthritis particularly on the right.  She limps, has an antalgic gait    PAST MEDICAL HISTORY:  Past Medical History:    Anesthesia complication    Anesthesia complication    patient states she wakes up crying    Carpal tunnel syndrome, left    Diabetes (HCC)    Disorder of thyroid    DM2 (diabetes mellitus, type 2) (HCC)    Essential hypertension    High blood pressure    High cholesterol    Hx of motion sickness    Hyperlipidemia    Migraines    Obesity (BMI 30-39.9)    PONV (postoperative nausea and vomiting)    Trigger finger, left little finger    Trigger finger, left middle finger       SURGICAL HISTORY:  Past Surgical History:   Procedure Laterality Date          Colonoscopy N/A 2021    Procedure: COLONOSCOPY;  Surgeon: Juan Johnson MD;  Location: [facility] ENDOSCOPY    Dilation/curettage,diagnostic      Incise finger tendon sheath Left 2024    Left small finger and Left middle finger trigger releases    Reduction left      2000    Reduction right      2000    Wrist arthroscop,release xvers lig Left  11/22/2024    Left endoscopic carpal tunnel release       SOCIAL HISTORY:   Social History     Occupational History    Not on file   Tobacco Use    Smoking status: Never    Smokeless tobacco: Never   Vaping Use    Vaping status: Never Used   Substance and Sexual Activity    Alcohol use: Yes     Comment: Socially    Drug use: No    Sexual activity: Not on file       CURRENT MEDICATIONS:   Current Outpatient Medications   Medication Sig Dispense Refill    diclofenac 1 % External Gel Apply 2 g topically 2 (two) times daily as needed. To the right outer hip 100 g 1    atorvastatin 20 MG Oral Tab Take 1 tablet (20 mg total) by mouth nightly. 90 tablet 3    HYDROcodone-acetaminophen 7.5-325 MG Oral Tab Take 1 tablet by mouth every 4 to 6 hours as needed for Pain. 10 tablet 0    metFORMIN  MG Oral Tablet 24 Hr Take 2 tablets (1,000 mg total) by mouth daily with breakfast AND 1 tablet (500 mg total) daily with dinner. 270 tablet 3    lisinopril 5 MG Oral Tab Take 1 tablet (5 mg total) by mouth daily. 90 tablet 3    levothyroxine 25 MCG Oral Tab Take 1 tablet (25 mcg total) by mouth before breakfast. 90 tablet 3    hydroCHLOROthiazide 12.5 MG Oral Tab Take 1 tablet (12.5 mg total) by mouth daily. 90 tablet 3    Insulin Pen Needle (BD PEN NEEDLE MAG U/F) 32G X 4 MM Does not apply Misc USE A NEW NEEDLE WITH EACH USE (Patient not taking: Reported on 2/10/2025) 1 Box 1    Blood Gluc Meter Disp-Strips Does not apply Device Needs 1 glucometer along with 100 test strips and 100 lancets with 11 refills each. Check sugars BID and prn. 1 Device 0       ALLERGIES:   Allergies[1]        PHYSICAL EXAM:   Ht 59\"   Wt 162 lb (73.5 kg)   LMP 09/01/2014 (Approximate)   BMI 32.72 kg/m²     Body mass index is 32.72 kg/m².      General: No immediate distress  Extremities: No lower extremity edema bilaterally   Spine: full and painfree lumbar ROM in all directions  Hips: Limited internal rotation bilaterally with pain on the  right  Knees: Full knee range of motion no effusion no pain.  Neuro:   Cognition: alert & oriented x 3, attentive, able to follow 2 step commands, comprehention intact, spontaneous speech intact  Strength: Lower extremities have 5/5 strength  Sensation: Normal lower extremities  Reflexes: Normal lower extremities        Data  Hemoglobin A1c in October 2024 was 5.5    Radiology Imaging:  I personally reviewed plain film x-rays of the hips showing moderate to severe left hip osteoarthritis with an inferior femoral bone spur, decreased joint space, subchondral cysts.    ASSESSMENT AND PLAN:  1. Primary osteoarthritis of right hip  Went over all the treatment options for hip arthritis.  She is clearly symptomatic.  Not interested in oral medication.  She is interested in injection, particularly a \"gel shot.\"  It was explained this was unavailable for hips unless it is a cash pay.  She was willing to try a steroid injection and aquatic physical therapy.  She will return 2 weeks postinjection for follow-up  - SPECIALTY (OTHER) - EXTERNAL  - PHYSICAL THERAPY EXTERNAL    2. Controlled type 2 diabetes mellitus without complication, without long-term current use of insulin (HCC)  Under good control.  It was discussed and she understands will need to monitor blood sugars after injection    3. Obesity (BMI 30-39.9)  Negative comorbidity for back and lower extremity symptoms    4. Depressive disorder  Negative comorbidity for painful conditions    5. Anxiety  Negative comorbidity for painful conditions        RTC post injection      The patient was in agreement with the assessment and plan.  All questions were answered.        Kenny Griffith MD  Physical Medicine and Rehabilitation/Sports Medicine  [facility]           [1]   Allergies  Allergen Reactions    Iodine (Topical) RASH    Radiology Contrast Iodinated Dyes RASH

## 2025-03-19 NOTE — TELEPHONE ENCOUNTER
Incoming call from patient who is requesting the gel injection for the hip. Patient made aware that insurance will not cover this medication/procedure for the hip, and would like to pay out of pocket.       Confirmed medication and dosage with Dr. Griffith. Estimate given to patient: $797.75. Patient informed this is an estimated cost only and prices are subject to change. Patient also informed that this does not cover the doctors fee/administrative fees and the price given was only for the medication and ultrasound ordered. Pt verbalized understanding.         Patient has been scheduled for: 04/07/2025 at 11:30AM.

## 2025-03-19 NOTE — PROGRESS NOTES
The following individual(s) verbally consented to be recorded using ambient AI listening technology and understand that they can each withdraw their consent to this listening technology at any point by asking the clinician to turn off or pause the recording:    Patient name: Valeria Elfego

## 2025-03-19 NOTE — TELEPHONE ENCOUNTER
Initiated authorization for Right hip injection under fluoroscopy. CPT/HCPCS 88542, 83295 dx:M16.11 to be done at [facility] with [facility] portal.    Status: No action required  Reference/Authorization # [id number]  Valid: 3/19/25-6/19/25  Authorization is not required based on medical necessity, however, is not a guarantee of payment and may be subject to review once claim is submitted.

## 2025-03-20 NOTE — TELEPHONE ENCOUNTER
Initiated authorization for Right Hip Orthovisc with 5cc Lidocaine under Ultrasound Guidance. CPT/HCPCS 70419, ,  dx:M16.11 with [employer] portal.  Patient has been scheduled for: 04/07/2025 at 11:30AM.     Status: No action required  Reference/Authorization # [id number]  Valid: 3/20/25-6/20/25  Authorization is not required based on medical necessity, however, is not a guarantee of payment and may be subject to review once claim is submitted.

## 2025-03-20 NOTE — TELEPHONE ENCOUNTER
Patient has been scheduled appropriately.     Right HIP Orthovisc Injection $797.75 due at time of appointment with 5cc Lidocaine under Ultrasound Guidance.

## 2025-03-24 ENCOUNTER — PATIENT MESSAGE (OUTPATIENT)
Dept: SURGERY | Facility: CLINIC | Age: 65
End: 2025-03-24

## 2025-03-24 NOTE — TELEPHONE ENCOUNTER
Spoke w/ pt.  Follow up/pre-op appt scheduled w/ pt for 4/28 at 2:30 PM for RECTR.  Pt verbalized understanding.

## 2025-03-31 ENCOUNTER — MED REC SCAN ONLY (OUTPATIENT)
Dept: PHYSICAL MEDICINE AND REHAB | Facility: CLINIC | Age: 65
End: 2025-03-31

## 2025-04-07 ENCOUNTER — OFFICE VISIT (OUTPATIENT)
Dept: PHYSICAL MEDICINE AND REHAB | Facility: CLINIC | Age: 65
End: 2025-04-07
Payer: COMMERCIAL

## 2025-04-07 DIAGNOSIS — M16.11 PRIMARY OSTEOARTHRITIS OF RIGHT HIP: Primary | ICD-10-CM

## 2025-04-07 PROCEDURE — 20611 DRAIN/INJ JOINT/BURSA W/US: CPT | Performed by: PHYSICAL MEDICINE & REHABILITATION

## 2025-04-07 NOTE — PROCEDURES
Valeria Telles is a 64 year old female who presents today for follow up for symptoms of right hip pain due to osteoarthritis.  We will be performing a Orthovisc injection of the hip under ultrasound guidance today.    Hip injection with ultrasound guidance  Location: right  After discussing benefits and possible side effects, we proceeded with a hip injection. The patient was consented.  The patient was placed in supine. The skin was sterilely prepped.  Using a 13 MHz linear array transducer, ultrasound was used to visualize the hip.  The femoral head was visualized and was irregular. Under live ultrasound visualization, a 22-gauge needle was directed to the head neck junction.  A total of 5 cc of Orthovisc was injected into the hip.     Permanent images were saved. The patient tolerated the procedure well without adverse effects.

## 2025-04-28 ENCOUNTER — OFFICE VISIT (OUTPATIENT)
Dept: SURGERY | Facility: CLINIC | Age: 65
End: 2025-04-28
Payer: COMMERCIAL

## 2025-04-28 DIAGNOSIS — M65.341 TRIGGER RING FINGER OF RIGHT HAND: ICD-10-CM

## 2025-04-28 DIAGNOSIS — G56.01 CARPAL TUNNEL SYNDROME ON RIGHT: Primary | ICD-10-CM

## 2025-04-28 DIAGNOSIS — M65.311 TRIGGER THUMB OF RIGHT HAND: ICD-10-CM

## 2025-04-28 DIAGNOSIS — G56.03 BILATERAL CARPAL TUNNEL SYNDROME: Primary | ICD-10-CM

## 2025-04-28 PROCEDURE — 99214 OFFICE O/P EST MOD 30 MIN: CPT | Performed by: PLASTIC SURGERY

## 2025-04-28 RX ORDER — HYDROCODONE BITARTRATE AND ACETAMINOPHEN 7.5; 325 MG/1; MG/1
TABLET ORAL
Qty: 20 TABLET | Refills: 0 | Status: SHIPPED | OUTPATIENT
Start: 2025-04-28

## 2025-04-28 NOTE — H&P
NEEDS RECTR      Pacemaker: No  Latex Allergy: no  Coumadin: No  Plavix: No  Other anticoagulants: No  Diet medication: No  Cardiac stents: No    HAND DOMINANCE:  Right    Profession: / computer work    RECONSTRUCTIVE HISTORY    SUN EXPOSURE   Current no   Past no   Sunburns yes   Tanning salons current no   Tanning salons past no     SKIN CANCER    Personal history of skin cancer: none                 ++++++++++++++++++++++++++++++++++++++++++++++++++    ELECTRODIAGNOSTIC STUDIES  -   JESSICA    DATE: 10/14/2024    RIGHT  Carpal Tunnel Syndrome: Moderate  Motor Latency: 7.6  Sensory Antidromic: 7.2  APB Normal: no  Neurogenic Changes: yes  Denervation Potentials: no      LEFT  Carpal Tunnel Syndrome: severe  Motor Latency: 7.0  Sensory Antidromic: NR  APB Normal: no  Neurogenic Changes: yes  Denervation Potentials: no    CURRENT:     Surgery 1: LECTR, LMF/LSFtrigger  - Date: 11/22/24  - Days Since: 157    5 months postop  No complaints, no pain left side    63-year-old female right-hand-dominant with right carpal tunnel    Intermittent numbness entire hand  Triggering right thumb and RRF    Difficulty using the computer and opening things    She is thrilled with her left hand surgery.  No numbness or triggering          UPDATED [facility]       MEDICAL  Past Medical History[1]     SURGICAL  Past Surgical History[2]     ALLERIGIES  Allergies[3]     MEDICATIONS  Current Medications[4]     SOCIAL HISTORY  Short Social Hx on File[5]     FAMILY HISTORY  Family History[6]       PHYSICAL EXAM:     CONSTITUTIONAL: Overall appearance - Normal  HEENT: Normocephalic  EYES: Conjunctiva - Right: Normal, Left: Normal; EOMI  EARS: Inspection - Right: Normal, Left: Normal  NECK/THYROID: Inspection - Normal, Palpation - Normal, Thyroid gland - Normal, No adenopathy  RESPIRATORY: Inspection - Normal, Effort - Normal  CARDIOVASCULAR: Regular rhythm, No murmurs  ABDOMEN: Inspection - Normal, No abdominal tenderness  NEURO:  Memory intact  PSYCH: Oriented to person, place, time, and situation, Appropriate mood and affect    Right hand full range of motion but painful triggering and locking of the thumb and ring finger    Excellent wrist hyperextension  Thenar intrinsics intact  Tinel positive to the palm      ASSESSMENT/PLAN:     Encounter Diagnoses   Name Primary?    Bilateral carpal tunnel syndrome Yes    Trigger thumb of right hand     Trigger ring finger of right hand        IMPRESSION:    CARPAL TUNNEL SYNDROME  right      DISPOSITION    We had a long discussion.  I explained to the patient what carpal tunnel syndrome is including treatment options.  The patient  may not have relief of symptoms with treatment.        This is a moderate carpal tunnel syndrome.    Because of the severity (see Severity Index) of this carpal tunnel, non-operative treatment is not indicated. This requires surgery.  We discussed the procedure at length, including risks as indicated on the Surgical Request Form.  Questions were answered and the patient wishes to proceed with treatment.    Some symptoms may persist   Some symptoms may not improve   Some symptoms may worsen   Further surgery may be necessary    Even with surgery that decompresses the nerve, nerve function may not return.  After relief of pressure on the nerve, the nerve must recover.  This could take up to a year, but the nerve may not recover even after pressure is relieved, so symptoms may persist.    POST-OPERATIVE  PROTOCOL:  We discussed post-operative restrictions at length.  It is critical to maintain the dressing post-operatively and to comply with post-operative instructions.  The dressing must be carefully cared for, must remain dry,  and cannot be removed under any circumstance.  Consistent elevation of the hand above heart level is critical.  Therapy will be necessary post-operatively.  Failure to comply with post-operative instructions, including therapy, will have significant  impact on the final result, and could lead to permanent pain, stiffness, weakness, and loss of function.    Even with satisfactory healing, the hand/digit may not regain normal ROM or normal function.    PLAN    RECTR  Right thumb trigger release  RRF trigger release    TRIGGER DIGIT right thumb/RRF      We discussed what a TRIGGER DIGIT is, including treatment options.  Questions were answered and the patient wishes to proceed with treatment.     This needs surgery.   I explained the procedure at length, including  post-operative course and risks as indicated on the Surgical Request Form.  Therapy may be necessary post-operatively.             RISKS:     Bleeding  Infection  Scar  Pain  Stiffness  Weakness  Loss of function  Anesthesia risks          [place]  Data Reviewed.                   2025  Barber Harrison MD          +++++++++++++++++++++++++++++++++++++++++++++++++    MEDICAL DECISION MAKING    PROBLEMS      MODERATE    (number / complexity)          Undiagnosed new problem with uncertain prognosis    DATA         STRAIGHTFORWARD    (amount / complexity)           MANAGEMENT RISK  HIGH    (complications/ morbidity)       Major surgery with risk factors                  MDM LEVEL    MODERATE           [1]   Past Medical History:   Anesthesia complication    Anesthesia complication    patient states she wakes up crying    Carpal tunnel syndrome, left    Diabetes (HCC)    Disorder of thyroid    DM2 (diabetes mellitus, type 2) (HCC)    Essential hypertension    High blood pressure    High cholesterol    Hx of motion sickness    Hyperlipidemia    Migraines    Obesity (BMI 30-39.9)    PONV (postoperative nausea and vomiting)    Trigger finger, left little finger    Trigger finger, left middle finger   [2]   Past Surgical History:  Procedure Laterality Date          Colonoscopy N/A 2021    Procedure: COLONOSCOPY;  Surgeon: Juan Johnson MD;  Location: [facility] ENDOSCOPY     Dilation/curettage,diagnostic      Incise finger tendon sheath Left 11/22/2024    Left small finger and Left middle finger trigger releases    Reduction left      2000    Reduction right      2000    Wrist arthroscop,release xvers lig Left 11/22/2024    Left endoscopic carpal tunnel release   [3]   Allergies  Allergen Reactions    Iodine (Topical) RASH    Radiology Contrast Iodinated Dyes RASH   [4]   Current Outpatient Medications   Medication Sig Dispense Refill    diclofenac 1 % External Gel Apply 2 g topically 2 (two) times daily as needed. To the right outer hip 100 g 1    atorvastatin 20 MG Oral Tab Take 1 tablet (20 mg total) by mouth nightly. 90 tablet 3    metFORMIN  MG Oral Tablet 24 Hr Take 2 tablets (1,000 mg total) by mouth daily with breakfast AND 1 tablet (500 mg total) daily with dinner. 270 tablet 3    lisinopril 5 MG Oral Tab Take 1 tablet (5 mg total) by mouth daily. 90 tablet 3    levothyroxine 25 MCG Oral Tab Take 1 tablet (25 mcg total) by mouth before breakfast. 90 tablet 3    hydroCHLOROthiazide 12.5 MG Oral Tab Take 1 tablet (12.5 mg total) by mouth daily. 90 tablet 3    Insulin Pen Needle (BD PEN NEEDLE MAG U/F) 32G X 4 MM Does not apply Misc USE A NEW NEEDLE WITH EACH USE 1 Box 1    Blood Gluc Meter Disp-Strips Does not apply Device Needs 1 glucometer along with 100 test strips and 100 lancets with 11 refills each. Check sugars BID and prn. 1 Device 0    HYDROcodone-acetaminophen 7.5-325 MG Oral Tab Take 1 tablet by mouth every 4 to 6 hours as needed for Pain. (Patient not taking: Reported on 4/28/2025) 10 tablet 0   [5]   Social History  Socioeconomic History    Marital status:    Tobacco Use    Smoking status: Never    Smokeless tobacco: Never   Vaping Use    Vaping status: Never Used   Substance and Sexual Activity    Alcohol use: Yes     Comment: Socially    Drug use: No   Other Topics Concern    Caffeine Concern Yes     Comment: Coffee, 1 cup daily    Exercise No    Social History Narrative    The patient does not use an assistive device..      The patient does live in a home with stairs.   [6]   Family History  Problem Relation Age of Onset    Cancer Mother         Cancer - liver    Other (Other) Father         alzheimers    Diabetes Neg     Glaucoma Neg

## 2025-04-28 NOTE — H&P (VIEW-ONLY)
NEEDS RECTR      Pacemaker: No  Latex Allergy: no  Coumadin: No  Plavix: No  Other anticoagulants: No  Diet medication: No  Cardiac stents: No    HAND DOMINANCE:  Right    Profession: / computer work    RECONSTRUCTIVE HISTORY    SUN EXPOSURE   Current no   Past no   Sunburns yes   Tanning salons current no   Tanning salons past no     SKIN CANCER    Personal history of skin cancer: none                 ++++++++++++++++++++++++++++++++++++++++++++++++++    ELECTRODIAGNOSTIC STUDIES  -   JESSICA    DATE: 10/14/2024    RIGHT  Carpal Tunnel Syndrome: Moderate  Motor Latency: 7.6  Sensory Antidromic: 7.2  APB Normal: no  Neurogenic Changes: yes  Denervation Potentials: no      LEFT  Carpal Tunnel Syndrome: severe  Motor Latency: 7.0  Sensory Antidromic: NR  APB Normal: no  Neurogenic Changes: yes  Denervation Potentials: no    CURRENT:     Surgery 1: LECTR, LMF/LSFtrigger  - Date: 11/22/24  - Days Since: 157    5 months postop  No complaints, no pain left side    63-year-old female right-hand-dominant with right carpal tunnel    Intermittent numbness entire hand  Triggering right thumb and RRF    Difficulty using the computer and opening things    She is thrilled with her left hand surgery.  No numbness or triggering          UPDATED [facility]       MEDICAL  Past Medical History[1]     SURGICAL  Past Surgical History[2]     ALLERIGIES  Allergies[3]     MEDICATIONS  Current Medications[4]     SOCIAL HISTORY  Short Social Hx on File[5]     FAMILY HISTORY  Family History[6]       PHYSICAL EXAM:     CONSTITUTIONAL: Overall appearance - Normal  HEENT: Normocephalic  EYES: Conjunctiva - Right: Normal, Left: Normal; EOMI  EARS: Inspection - Right: Normal, Left: Normal  NECK/THYROID: Inspection - Normal, Palpation - Normal, Thyroid gland - Normal, No adenopathy  RESPIRATORY: Inspection - Normal, Effort - Normal  CARDIOVASCULAR: Regular rhythm, No murmurs  ABDOMEN: Inspection - Normal, No abdominal tenderness  NEURO:  Memory intact  PSYCH: Oriented to person, place, time, and situation, Appropriate mood and affect    Right hand full range of motion but painful triggering and locking of the thumb and ring finger    Excellent wrist hyperextension  Thenar intrinsics intact  Tinel positive to the palm      ASSESSMENT/PLAN:     Encounter Diagnoses   Name Primary?    Bilateral carpal tunnel syndrome Yes    Trigger thumb of right hand     Trigger ring finger of right hand        IMPRESSION:    CARPAL TUNNEL SYNDROME  right      DISPOSITION    We had a long discussion.  I explained to the patient what carpal tunnel syndrome is including treatment options.  The patient  may not have relief of symptoms with treatment.        This is a moderate carpal tunnel syndrome.    Because of the severity (see Severity Index) of this carpal tunnel, non-operative treatment is not indicated. This requires surgery.  We discussed the procedure at length, including risks as indicated on the Surgical Request Form.  Questions were answered and the patient wishes to proceed with treatment.    Some symptoms may persist   Some symptoms may not improve   Some symptoms may worsen   Further surgery may be necessary    Even with surgery that decompresses the nerve, nerve function may not return.  After relief of pressure on the nerve, the nerve must recover.  This could take up to a year, but the nerve may not recover even after pressure is relieved, so symptoms may persist.    POST-OPERATIVE  PROTOCOL:  We discussed post-operative restrictions at length.  It is critical to maintain the dressing post-operatively and to comply with post-operative instructions.  The dressing must be carefully cared for, must remain dry,  and cannot be removed under any circumstance.  Consistent elevation of the hand above heart level is critical.  Therapy will be necessary post-operatively.  Failure to comply with post-operative instructions, including therapy, will have significant  impact on the final result, and could lead to permanent pain, stiffness, weakness, and loss of function.    Even with satisfactory healing, the hand/digit may not regain normal ROM or normal function.    PLAN    RECTR  Right thumb trigger release  RRF trigger release    TRIGGER DIGIT right thumb/RRF      We discussed what a TRIGGER DIGIT is, including treatment options.  Questions were answered and the patient wishes to proceed with treatment.     This needs surgery.   I explained the procedure at length, including  post-operative course and risks as indicated on the Surgical Request Form.  Therapy may be necessary post-operatively.             RISKS:     Bleeding  Infection  Scar  Pain  Stiffness  Weakness  Loss of function  Anesthesia risks          [place]  Data Reviewed.                   2025  Barber Harrison MD          +++++++++++++++++++++++++++++++++++++++++++++++++    MEDICAL DECISION MAKING    PROBLEMS      MODERATE    (number / complexity)          Undiagnosed new problem with uncertain prognosis    DATA         STRAIGHTFORWARD    (amount / complexity)           MANAGEMENT RISK  HIGH    (complications/ morbidity)       Major surgery with risk factors                  MDM LEVEL    MODERATE           [1]   Past Medical History:   Anesthesia complication    Anesthesia complication    patient states she wakes up crying    Carpal tunnel syndrome, left    Diabetes (HCC)    Disorder of thyroid    DM2 (diabetes mellitus, type 2) (HCC)    Essential hypertension    High blood pressure    High cholesterol    Hx of motion sickness    Hyperlipidemia    Migraines    Obesity (BMI 30-39.9)    PONV (postoperative nausea and vomiting)    Trigger finger, left little finger    Trigger finger, left middle finger   [2]   Past Surgical History:  Procedure Laterality Date          Colonoscopy N/A 2021    Procedure: COLONOSCOPY;  Surgeon: uJan Johnson MD;  Location: [facility] ENDOSCOPY     Dilation/curettage,diagnostic      Incise finger tendon sheath Left 11/22/2024    Left small finger and Left middle finger trigger releases    Reduction left      2000    Reduction right      2000    Wrist arthroscop,release xvers lig Left 11/22/2024    Left endoscopic carpal tunnel release   [3]   Allergies  Allergen Reactions    Iodine (Topical) RASH    Radiology Contrast Iodinated Dyes RASH   [4]   Current Outpatient Medications   Medication Sig Dispense Refill    diclofenac 1 % External Gel Apply 2 g topically 2 (two) times daily as needed. To the right outer hip 100 g 1    atorvastatin 20 MG Oral Tab Take 1 tablet (20 mg total) by mouth nightly. 90 tablet 3    metFORMIN  MG Oral Tablet 24 Hr Take 2 tablets (1,000 mg total) by mouth daily with breakfast AND 1 tablet (500 mg total) daily with dinner. 270 tablet 3    lisinopril 5 MG Oral Tab Take 1 tablet (5 mg total) by mouth daily. 90 tablet 3    levothyroxine 25 MCG Oral Tab Take 1 tablet (25 mcg total) by mouth before breakfast. 90 tablet 3    hydroCHLOROthiazide 12.5 MG Oral Tab Take 1 tablet (12.5 mg total) by mouth daily. 90 tablet 3    Insulin Pen Needle (BD PEN NEEDLE MAG U/F) 32G X 4 MM Does not apply Misc USE A NEW NEEDLE WITH EACH USE 1 Box 1    Blood Gluc Meter Disp-Strips Does not apply Device Needs 1 glucometer along with 100 test strips and 100 lancets with 11 refills each. Check sugars BID and prn. 1 Device 0    HYDROcodone-acetaminophen 7.5-325 MG Oral Tab Take 1 tablet by mouth every 4 to 6 hours as needed for Pain. (Patient not taking: Reported on 4/28/2025) 10 tablet 0   [5]   Social History  Socioeconomic History    Marital status:    Tobacco Use    Smoking status: Never    Smokeless tobacco: Never   Vaping Use    Vaping status: Never Used   Substance and Sexual Activity    Alcohol use: Yes     Comment: Socially    Drug use: No   Other Topics Concern    Caffeine Concern Yes     Comment: Coffee, 1 cup daily    Exercise No    Social History Narrative    The patient does not use an assistive device..      The patient does live in a home with stairs.   [6]   Family History  Problem Relation Age of Onset    Cancer Mother         Cancer - liver    Other (Other) Father         alzheimers    Diabetes Neg     Glaucoma Neg

## 2025-04-28 NOTE — PROGRESS NOTES
Follow up Pre-op for RECTR.  Symptoms have worsened since initial consultation 11/11/24.  Constant LTH MCP and CMC pain and RMF,RRF and RSF Metacarpal pain. Dull. Rates pain 7/10. Taking advil prn,helpful.  Intermittent numbness of entire RH especially in the am.   Difficulty using the computer mouse and opening things.  Has tried wearing a LH splint at night,ineffective.    Also c/o RRF trigger for 1 year and RTH trigger for  6 months.  No previous treatments    Patient request for surgery signed by patient and witnessed and signed by RN.  Prescription for Norco 7.5 mg electronically sent to pharmacy per Dr. Harrison's order and patient instructed to  prescription before surgery.   Pre-Surgical Instruction Handout, Hand Elevation handout and Post-Operative Instruction Handout given to and reviewed w/patient.  [employer] work status report for work .  All questions and concerns answered; pt verbalized an understanding of all pre-operative teaching.  Patient instructed to call the office with any further questions and/or concerns.  Patient escorted to surgery scheduling to schedule surgery and post-operative appointments.

## 2025-05-12 ENCOUNTER — OFFICE VISIT (OUTPATIENT)
Dept: PHYSICAL MEDICINE AND REHAB | Facility: CLINIC | Age: 65
End: 2025-05-12
Payer: COMMERCIAL

## 2025-05-12 VITALS — BODY MASS INDEX: 33.06 KG/M2 | HEIGHT: 59 IN | WEIGHT: 164 LBS

## 2025-05-12 DIAGNOSIS — E66.9 OBESITY (BMI 30-39.9): ICD-10-CM

## 2025-05-12 DIAGNOSIS — M16.11 PRIMARY OSTEOARTHRITIS OF RIGHT HIP: Primary | ICD-10-CM

## 2025-05-12 DIAGNOSIS — F32.A DEPRESSIVE DISORDER: ICD-10-CM

## 2025-05-12 DIAGNOSIS — E11.9 CONTROLLED TYPE 2 DIABETES MELLITUS WITHOUT COMPLICATION, WITHOUT LONG-TERM CURRENT USE OF INSULIN (HCC): ICD-10-CM

## 2025-05-12 DIAGNOSIS — F41.9 ANXIETY: ICD-10-CM

## 2025-05-12 PROCEDURE — 3008F BODY MASS INDEX DOCD: CPT | Performed by: PHYSICAL MEDICINE & REHABILITATION

## 2025-05-12 PROCEDURE — 99213 OFFICE O/P EST LOW 20 MIN: CPT | Performed by: PHYSICAL MEDICINE & REHABILITATION

## 2025-05-12 NOTE — PROGRESS NOTES
[facility]  Progress Note    CHIEF COMPLAINT:    Chief Complaint   Patient presents with    Follow - Up     LOV 4/7/25  patient following up on hip inj done at last visit. States her pain isn't as frequent however she continues to have pain in her right hip with certain positions. States when she stands up from the couch she notices sharp pain that feels like her hip wants to give out. LOP 3/10       History of Present Illness:  Valeria Telles is a 64 year old female who presents today for follow up for symptoms of right hip osteoarthritis.  I last saw her I performed an ultrasound-guided right hip injection with Orthovisc at her request.  Pain is 3/10, previously it was 8/10.  At her last visit she was uninterested in oral medication, but was okay with physical therapy.  Despite the pain ratings being better, she still has sharp pain and antalgia.  She is requesting a steroid injection.    PAST MEDICAL HISTORY:  Past Medical History[1]    SURGICAL HISTORY:  Past Surgical History[2]    SOCIAL HISTORY:   Social History     Occupational History    Not on file   Tobacco Use    Smoking status: Never    Smokeless tobacco: Never   Vaping Use    Vaping status: Never Used   Substance and Sexual Activity    Alcohol use: Yes     Comment: Socially    Drug use: No    Sexual activity: Not on file       CURRENT MEDICATIONS:   Current Medications[3]    ALLERGIES:   Allergies[4]        PHYSICAL EXAM:   Ht 59\"   Wt 164 lb (74.4 kg)   LMP 09/01/2014 (Approximate)   BMI 33.12 kg/m²     Body mass index is 33.12 kg/m².      General: No immediate distress  Extremities: No lower extremity edema bilaterally   Spine: full and painfree lumbar ROM in all directions  Hips: Pain recreated with right hip internal and external rotation  Neuro:   Cognition: alert & oriented x 3, attentive, able to follow 2 step commands, comprehention intact, spontaneous speech intact  Strength: Lower extremities  have 5/5 strength        Data    Radiology Imaging:  I personally reviewed plain film x-rays of the hips showing moderate to severe right hip osteoarthritis with an inferior femoral bone spur, decreased joint space, subchondral cysts.       ASSESSMENT AND PLAN:  1. Primary osteoarthritis of right hip [M16.11]  She is requesting another hip injection.  In the interim she is getting hand surgery by Dr. Harrison.  I asked her to get approval from Dr. Harrison for steroids in the perisurgical time period.  She will let me know.  Otherwise I recommend that she resume aquatic exercises.    2. Controlled type 2 diabetes mellitus without complication, without long-term current use of insulin (HCC)  Avoiding steroids if possible    3. Obesity (BMI 30-39.9)  Negative comorbidity for back and lower extremity complaint    4. Depressive disorder  Negative comorbidity for painful conditions    5. Anxiety  Negative comorbidity for painful conditions              The patient was in agreement with the assessment and plan.  All questions were answered.        Kenny Griffith MD  Physical Medicine and Rehabilitation/Sports Medicine  [facility]           [1]   Past Medical History:   Anesthesia complication    Anesthesia complication    patient states she wakes up crying    Carpal tunnel syndrome, left    Colon polyp    Constipation    Diabetes (HCC)    Disorder of thyroid    DM2 (diabetes mellitus, type 2) (HCC)    Essential hypertension    High blood pressure    High cholesterol    Hx of motion sickness    Hyperlipidemia    Migraines    Obesity (BMI 30-39.9)    PONV (postoperative nausea and vomiting)    Trigger finger, left little finger    Trigger finger, left middle finger   [2]   Past Surgical History:  Procedure Laterality Date          Colonoscopy N/A 2021    Procedure: COLONOSCOPY;  Surgeon: Juan Johnson MD;  Location: [facility] ENDOSCOPY    Dilation/curettage,diagnostic       Incise finger tendon sheath Left 11/22/2024    Left small finger and Left middle finger trigger releases    Reduction left      2000    Reduction right      2000    Wrist arthroscop,release xvers lig Left 11/22/2024    Left endoscopic carpal tunnel release   [3]   Current Outpatient Medications   Medication Sig Dispense Refill    HYDROcodone-acetaminophen 7.5-325 MG Oral Tab Take 0.5-1 tablets by mouth every 4 to 6 hours as needed for Pain. 20 tablet 0    diclofenac 1 % External Gel Apply 2 g topically 2 (two) times daily as needed. To the right outer hip 100 g 1    atorvastatin 20 MG Oral Tab Take 1 tablet (20 mg total) by mouth nightly. 90 tablet 3    HYDROcodone-acetaminophen 7.5-325 MG Oral Tab Take 1 tablet by mouth every 4 to 6 hours as needed for Pain. (Patient not taking: Reported on 4/28/2025) 10 tablet 0    metFORMIN  MG Oral Tablet 24 Hr Take 2 tablets (1,000 mg total) by mouth daily with breakfast AND 1 tablet (500 mg total) daily with dinner. 270 tablet 3    lisinopril 5 MG Oral Tab Take 1 tablet (5 mg total) by mouth daily. 90 tablet 3    levothyroxine 25 MCG Oral Tab Take 1 tablet (25 mcg total) by mouth before breakfast. 90 tablet 3    hydroCHLOROthiazide 12.5 MG Oral Tab Take 1 tablet (12.5 mg total) by mouth daily. 90 tablet 3    Insulin Pen Needle (BD PEN NEEDLE MAG U/F) 32G X 4 MM Does not apply Misc USE A NEW NEEDLE WITH EACH USE 1 Box 1    Blood Gluc Meter Disp-Strips Does not apply Device Needs 1 glucometer along with 100 test strips and 100 lancets with 11 refills each. Check sugars BID and prn. 1 Device 0   [4]   Allergies  Allergen Reactions    Iodine (Topical) RASH    Radiology Contrast Iodinated Dyes RASH

## 2025-05-13 ENCOUNTER — TELEPHONE (OUTPATIENT)
Dept: SURGERY | Facility: CLINIC | Age: 65
End: 2025-05-13

## 2025-05-15 RX ORDER — ACETAMINOPHEN 500 MG
500 TABLET ORAL EVERY 6 HOURS PRN
COMMUNITY

## 2025-05-16 ENCOUNTER — ANESTHESIA EVENT (OUTPATIENT)
Dept: SURGERY | Facility: HOSPITAL | Age: 65
End: 2025-05-16
Payer: COMMERCIAL

## 2025-05-16 ENCOUNTER — ANESTHESIA (OUTPATIENT)
Dept: SURGERY | Facility: HOSPITAL | Age: 65
End: 2025-05-16
Payer: COMMERCIAL

## 2025-05-16 ENCOUNTER — HOSPITAL ENCOUNTER (OUTPATIENT)
Facility: HOSPITAL | Age: 65
Setting detail: HOSPITAL OUTPATIENT SURGERY
Discharge: HOME | End: 2025-05-16
Attending: PLASTIC SURGERY | Admitting: PLASTIC SURGERY
Payer: COMMERCIAL

## 2025-05-16 ENCOUNTER — HOSPITAL DOCUMENTATION (OUTPATIENT)
Dept: SURGERY | Facility: CLINIC | Age: 65
End: 2025-05-16

## 2025-05-16 VITALS
HEART RATE: 68 BPM | DIASTOLIC BLOOD PRESSURE: 89 MMHG | HEIGHT: 59 IN | SYSTOLIC BLOOD PRESSURE: 145 MMHG | RESPIRATION RATE: 12 BRPM | TEMPERATURE: 98 F | OXYGEN SATURATION: 95 % | BODY MASS INDEX: 32.46 KG/M2 | WEIGHT: 161 LBS

## 2025-05-16 DIAGNOSIS — G56.03 BILATERAL CARPAL TUNNEL SYNDROME: Primary | ICD-10-CM

## 2025-05-16 DIAGNOSIS — M65.341 TRIGGER RING FINGER OF RIGHT HAND: ICD-10-CM

## 2025-05-16 DIAGNOSIS — M65.311 TRIGGER THUMB OF RIGHT HAND: ICD-10-CM

## 2025-05-16 LAB
GLUCOSE BLDC GLUCOMTR-MCNC: 119 MG/DL (ref 70–99)
GLUCOSE BLDC GLUCOMTR-MCNC: 132 MG/DL (ref 70–99)

## 2025-05-16 PROCEDURE — 26055 INCISE FINGER TENDON SHEATH: CPT | Performed by: PLASTIC SURGERY

## 2025-05-16 PROCEDURE — 29848 WRIST ENDOSCOPY/SURGERY: CPT | Performed by: PLASTIC SURGERY

## 2025-05-16 RX ORDER — DEXTROSE MONOHYDRATE 25 G/50ML
50 INJECTION, SOLUTION INTRAVENOUS
Status: DISCONTINUED | OUTPATIENT
Start: 2025-05-16 | End: 2025-05-16

## 2025-05-16 RX ORDER — HYDROMORPHONE HYDROCHLORIDE 1 MG/ML
0.6 INJECTION, SOLUTION INTRAMUSCULAR; INTRAVENOUS; SUBCUTANEOUS EVERY 5 MIN PRN
Status: DISCONTINUED | OUTPATIENT
Start: 2025-05-16 | End: 2025-05-16

## 2025-05-16 RX ORDER — SODIUM CHLORIDE, SODIUM LACTATE, POTASSIUM CHLORIDE, CALCIUM CHLORIDE 600; 310; 30; 20 MG/100ML; MG/100ML; MG/100ML; MG/100ML
INJECTION, SOLUTION INTRAVENOUS CONTINUOUS
Status: DISCONTINUED | OUTPATIENT
Start: 2025-05-16 | End: 2025-05-16

## 2025-05-16 RX ORDER — MORPHINE SULFATE 4 MG/ML
2 INJECTION, SOLUTION INTRAMUSCULAR; INTRAVENOUS EVERY 10 MIN PRN
Status: DISCONTINUED | OUTPATIENT
Start: 2025-05-16 | End: 2025-05-16

## 2025-05-16 RX ORDER — METOCLOPRAMIDE HYDROCHLORIDE 5 MG/ML
10 INJECTION INTRAMUSCULAR; INTRAVENOUS ONCE
Status: COMPLETED | OUTPATIENT
Start: 2025-05-16 | End: 2025-05-16

## 2025-05-16 RX ORDER — LABETALOL HYDROCHLORIDE 5 MG/ML
5 INJECTION, SOLUTION INTRAVENOUS EVERY 5 MIN PRN
Status: DISCONTINUED | OUTPATIENT
Start: 2025-05-16 | End: 2025-05-16

## 2025-05-16 RX ORDER — MIDAZOLAM HYDROCHLORIDE 1 MG/ML
INJECTION INTRAMUSCULAR; INTRAVENOUS AS NEEDED
Status: DISCONTINUED | OUTPATIENT
Start: 2025-05-16 | End: 2025-05-16 | Stop reason: SURG

## 2025-05-16 RX ORDER — PROCHLORPERAZINE EDISYLATE 5 MG/ML
5 INJECTION INTRAMUSCULAR; INTRAVENOUS EVERY 8 HOURS PRN
Status: DISCONTINUED | OUTPATIENT
Start: 2025-05-16 | End: 2025-05-16

## 2025-05-16 RX ORDER — METOCLOPRAMIDE 10 MG/1
10 TABLET ORAL ONCE
Status: COMPLETED | OUTPATIENT
Start: 2025-05-16 | End: 2025-05-16

## 2025-05-16 RX ORDER — HYDROCODONE BITARTRATE AND ACETAMINOPHEN 7.5; 325 MG/1; MG/1
1 TABLET ORAL EVERY 4 HOURS PRN
Refills: 0 | Status: DISCONTINUED | OUTPATIENT
Start: 2025-05-16 | End: 2025-05-16

## 2025-05-16 RX ORDER — ONDANSETRON 2 MG/ML
4 INJECTION INTRAMUSCULAR; INTRAVENOUS EVERY 6 HOURS PRN
Status: DISCONTINUED | OUTPATIENT
Start: 2025-05-16 | End: 2025-05-16

## 2025-05-16 RX ORDER — MORPHINE SULFATE 4 MG/ML
4 INJECTION, SOLUTION INTRAMUSCULAR; INTRAVENOUS EVERY 10 MIN PRN
Status: DISCONTINUED | OUTPATIENT
Start: 2025-05-16 | End: 2025-05-16

## 2025-05-16 RX ORDER — LIDOCAINE HYDROCHLORIDE 10 MG/ML
INJECTION, SOLUTION EPIDURAL; INFILTRATION; INTRACAUDAL; PERINEURAL AS NEEDED
Status: DISCONTINUED | OUTPATIENT
Start: 2025-05-16 | End: 2025-05-16 | Stop reason: SURG

## 2025-05-16 RX ORDER — ONDANSETRON 2 MG/ML
INJECTION INTRAMUSCULAR; INTRAVENOUS AS NEEDED
Status: DISCONTINUED | OUTPATIENT
Start: 2025-05-16 | End: 2025-05-16 | Stop reason: SURG

## 2025-05-16 RX ORDER — NALOXONE HYDROCHLORIDE 0.4 MG/ML
0.08 INJECTION, SOLUTION INTRAMUSCULAR; INTRAVENOUS; SUBCUTANEOUS AS NEEDED
Status: DISCONTINUED | OUTPATIENT
Start: 2025-05-16 | End: 2025-05-16

## 2025-05-16 RX ORDER — FAMOTIDINE 10 MG/ML
20 INJECTION, SOLUTION INTRAVENOUS ONCE
Status: COMPLETED | OUTPATIENT
Start: 2025-05-16 | End: 2025-05-16

## 2025-05-16 RX ORDER — DEXAMETHASONE SODIUM PHOSPHATE 4 MG/ML
VIAL (ML) INJECTION AS NEEDED
Status: DISCONTINUED | OUTPATIENT
Start: 2025-05-16 | End: 2025-05-16 | Stop reason: SURG

## 2025-05-16 RX ORDER — MORPHINE SULFATE 10 MG/ML
6 INJECTION, SOLUTION INTRAMUSCULAR; INTRAVENOUS EVERY 10 MIN PRN
Status: DISCONTINUED | OUTPATIENT
Start: 2025-05-16 | End: 2025-05-16

## 2025-05-16 RX ORDER — ACETAMINOPHEN 500 MG
1000 TABLET ORAL ONCE
Status: COMPLETED | OUTPATIENT
Start: 2025-05-16 | End: 2025-05-16

## 2025-05-16 RX ORDER — HYDROMORPHONE HYDROCHLORIDE 1 MG/ML
0.2 INJECTION, SOLUTION INTRAMUSCULAR; INTRAVENOUS; SUBCUTANEOUS EVERY 5 MIN PRN
Status: DISCONTINUED | OUTPATIENT
Start: 2025-05-16 | End: 2025-05-16

## 2025-05-16 RX ORDER — HYDROMORPHONE HYDROCHLORIDE 1 MG/ML
0.4 INJECTION, SOLUTION INTRAMUSCULAR; INTRAVENOUS; SUBCUTANEOUS EVERY 5 MIN PRN
Status: DISCONTINUED | OUTPATIENT
Start: 2025-05-16 | End: 2025-05-16

## 2025-05-16 RX ORDER — NICOTINE POLACRILEX 4 MG
30 LOZENGE BUCCAL
Status: DISCONTINUED | OUTPATIENT
Start: 2025-05-16 | End: 2025-05-16

## 2025-05-16 RX ORDER — INSULIN ASPART 100 [IU]/ML
INJECTION, SOLUTION INTRAVENOUS; SUBCUTANEOUS ONCE
Status: DISCONTINUED | OUTPATIENT
Start: 2025-05-16 | End: 2025-05-16

## 2025-05-16 RX ORDER — KETOROLAC TROMETHAMINE 30 MG/ML
INJECTION, SOLUTION INTRAMUSCULAR; INTRAVENOUS AS NEEDED
Status: DISCONTINUED | OUTPATIENT
Start: 2025-05-16 | End: 2025-05-16 | Stop reason: SURG

## 2025-05-16 RX ORDER — HYDROMORPHONE HYDROCHLORIDE 1 MG/ML
INJECTION, SOLUTION INTRAMUSCULAR; INTRAVENOUS; SUBCUTANEOUS AS NEEDED
Status: DISCONTINUED | OUTPATIENT
Start: 2025-05-16 | End: 2025-05-16 | Stop reason: SURG

## 2025-05-16 RX ORDER — NICOTINE POLACRILEX 4 MG
15 LOZENGE BUCCAL
Status: DISCONTINUED | OUTPATIENT
Start: 2025-05-16 | End: 2025-05-16

## 2025-05-16 RX ORDER — FAMOTIDINE 20 MG/1
20 TABLET, FILM COATED ORAL ONCE
Status: COMPLETED | OUTPATIENT
Start: 2025-05-16 | End: 2025-05-16

## 2025-05-16 RX ADMIN — MIDAZOLAM HYDROCHLORIDE 2 MG: 1 INJECTION INTRAMUSCULAR; INTRAVENOUS at 07:20:00

## 2025-05-16 RX ADMIN — HYDROMORPHONE HYDROCHLORIDE 0.5 MG: 1 INJECTION, SOLUTION INTRAMUSCULAR; INTRAVENOUS; SUBCUTANEOUS at 07:58:00

## 2025-05-16 RX ADMIN — SODIUM CHLORIDE, SODIUM LACTATE, POTASSIUM CHLORIDE, CALCIUM CHLORIDE: 600; 310; 30; 20 INJECTION, SOLUTION INTRAVENOUS at 08:51:00

## 2025-05-16 RX ADMIN — LIDOCAINE HYDROCHLORIDE 50 MG: 10 INJECTION, SOLUTION EPIDURAL; INFILTRATION; INTRACAUDAL; PERINEURAL at 07:29:00

## 2025-05-16 RX ADMIN — ONDANSETRON 4 MG: 2 INJECTION INTRAMUSCULAR; INTRAVENOUS at 07:35:00

## 2025-05-16 RX ADMIN — SODIUM CHLORIDE, SODIUM LACTATE, POTASSIUM CHLORIDE, CALCIUM CHLORIDE: 600; 310; 30; 20 INJECTION, SOLUTION INTRAVENOUS at 09:05:00

## 2025-05-16 RX ADMIN — KETOROLAC TROMETHAMINE 30 MG: 30 INJECTION, SOLUTION INTRAMUSCULAR; INTRAVENOUS at 08:50:00

## 2025-05-16 RX ADMIN — SODIUM CHLORIDE, SODIUM LACTATE, POTASSIUM CHLORIDE, CALCIUM CHLORIDE: 600; 310; 30; 20 INJECTION, SOLUTION INTRAVENOUS at 07:21:00

## 2025-05-16 RX ADMIN — DEXAMETHASONE SODIUM PHOSPHATE 4 MG: 4 MG/ML VIAL (ML) INJECTION at 07:35:00

## 2025-05-16 NOTE — BRIEF OP NOTE
Pre-Operative Diagnosis: Carpal tunnel syndrome on right [G56.01]  Trigger thumb of right hand [M65.311]  Trigger ring finger of right hand [M65.341]     Post-Operative Diagnosis: * No post-op diagnosis entered *      Procedure Performed:   Right endoscopic carpal tunnel release, right thumb and right ring finger trigger release    Surgeons and Role:  Panel 1:     * Barber Sebastian MD - Primary  Panel 2:     * Barber Sebastian MD - Primary    Assistant(s):        Surgical Findings: CTS, triggers     Specimen: None     Estimated Blood Loss: 0 ml    Dictation Number:      Barber Harrison MD  5/16/202

## 2025-05-16 NOTE — DISCHARGE INSTRUCTIONS
HOME INSTRUCTIONS  AMBSURG HOME CARE INSTRUCTIONS: POST-OP ANESTHESIA  The medication that you received for sedation or general anesthesia can last up to 24 hours. Your judgment and reflexes may be altered, even if you feel like your normal self.      We Recommend:   Do not drive any motor vehicle or bicycle   Avoid mowing the lawn, playing sports, or working with power tools/applicances (power saws, electric knives or mixers)   That you have someone stay with you on your first night home   Do not drink alcohol or take sleeping pills or tranquilizers   Do not sign legal documents within 24 hours of your procedure   If you had a nerve block for your surgery, take extra care not to put any pressure on your arm or hand for 24 hours    It is normal:  For you to have a sore throat if you had a breathing tube during surgery (while you were asleep!). The sore throat should get better within 48 hours. You can gargle with warm salt water (1/2 tsp in 4 oz warm water) or use a throat lozenge for comfort  To feel muscle aches or soreness especially in the abdomen, chest or neck. The achy feeling should go away in the next 24 hours  To feel weak, sleepy or \"wiped out\". Your should start feeling better in the next 24 hours.   To experience mild discomforts such as sore lip or tongue, headache, cramps, gas pains or a bloated feeling in your abdomen.   To experience mild back pain or soreness for a day or two if you had spinal or epidural anesthesia.   If you had laparoscopic surgery, to feel shoulder pain or discomfort on the day of surgery.   For some patients to have nausea after surgery/anesthesia    If you feel nausea or experience vomiting:   Try to move around less.   Eat less than usual or drink only liquids until the next morning   Nausea should resolve in about 24 hours    If you have a problem when you are at home:    Call your surgeons office       Dr Harrison Discharge Instructions      Barber Harrison,  M.D.   [phone]  Plastic and Reconstructive Surgery, Hand Surgery  [address] [place] [address], [address]  [place] [address]     GENERAL INSTRUCTIONS:  Do not remove dressing for any reason.  Keep dressing clean and dry.  Some drainage (blood and fluid) through the dressing is expected.  Some swelling is normal.  Take medications as directed.      HANDS:  Keep elevated (above heart-level) at all times.  Do not try to move fingers or hand within the dressing.  Check fingertips for circulation.  Gentle fist - 10 repetitions 4 times a day.  Keep in a sling at all times.           YOU HAVE AN APPOINTMENT AT THE OFFICE ON __________5/19/25_______    PLEASE CALL THE OFFICE _____________________________________     AND MAKE AN APPOINTMENT FOR ______________________________

## 2025-05-16 NOTE — ANESTHESIA PROCEDURE NOTES
Airway  Date/Time: 5/16/2025 7:29 AM  Reason: elective      General Information and Staff   Patient location during procedure: OR  Anesthesiologist: Chandra Rao MD  Resident/CRNA: Crystal Bernal CRNA  Performed: CRNA   Performed by: Crystal Bernal CRNA  Authorized by: Chandra Rao MD        Indications and Patient Condition  Indications for airway management: anesthesia  Sedation level: minimal      Preoxygenated: yesPatient position: sniffing  MILS maintained throughout    Mask difficulty assessment: 0 - not attempted    Final Airway Details    Final airway type: supraglottic airway      Successful airway: classic  Size: 4     Number of attempts at approach: 1

## 2025-05-16 NOTE — OPERATIVE REPORT
[facility]    PATIENT'S NAME: VERNELL BAEZA   ATTENDING PHYSICIAN: Barber Harrison MD   OPERATING PHYSICIAN: Barber Harrison MD   PATIENT ACCOUNT#:   [id number]    LOCATION:  [address]  MEDICAL RECORD #:   [id number]       YOB: 1960  ADMISSION DATE:       05/16/2025      OPERATION DATE:  05/16/2025    OPERATIVE REPORT      PREOPERATIVE DIAGNOSIS:  Right carpal tunnel syndrome, right thumb trigger, right ring finger trigger.  POSTOPERATIVE DIAGNOSIS:  Right carpal tunnel syndrome, right thumb trigger, right ring finger trigger.  PROCEDURE:  Right endoscopic carpal tunnel release, right thumb trigger release, right ring finger trigger release.      INDICATIONS:  A 64-year-old female, right-hand dominant, with worsening right carpal tunnel symptoms.  She has intermittent numbness of all the digits.  She has weakness and difficulty opening jars.  She has recently developed painful triggering with locking of the right thumb and the right ring finger.  She is admitted to the operating amphitheater for right endoscopic carpal tunnel release, right thumb trigger release, and right ring finger trigger release.  She had a left endoscopic carpal tunnel release with trigger release in November with complete relief of symptoms.    FINDINGS:  The right hand has full range of motion but with painful triggering and locking of the right ring finger and the right thumb.    OPERATIVE TECHNIQUE:  The patient was placed under general anesthesia.  Hand and forearm were prepped and draped in the usual sterile fashion.  A pneumatic tourniquet was inflated to 250 mmHg.    Proximal and distal incisions were made.  The proximal incision was extended to the antebrachial fascia, which was incised longitudinally 3 cm proximal to the proximal incision.  A curved dissector was placed deep to the antebrachial fascia and in continuity with the transverse carpal ligament.  The slotted cannula and  endoscope were placed.  We had excellent visualization of the dorsal surface of the transverse carpal ligament.  Complete release of the transverse carpal ligament was accomplished with a push knife, a triangle knife, and a pull knife.  We had excellent herniation of palmar fat into the cannula.  The cannula and endoscope were withdrawn.  A curved dissector was used to document complete release of the fascia proximally and the transverse carpal ligament distally.  The skin edges were reapproximated with 4-0 nylon.      A zigzag incision was made over the first annular ligament of the thumb.  We dissected the first annular ligament from surrounding structures, carefully preserving the neurovascular bundles and the skin flaps.  The first annular ligament was incised longitudinally through its length.  This allowed for full excursion of the flexor pollicis longus without crepitus, triggering, or locking.  Skin edges were reapproximated with 4-0 nylon.    A zigzag incision was made over the first annular ligament of the right ring finger.  It was dissected from surrounding structures and incised longitudinally through its length.  There was full excursion of the flexor tendons with out crepitus, triggering, or locking.  Skin edges were reapproximated with 4-0 nylon.  A soft dressing was placed.    The tourniquet was released.  Total tourniquet time 53 minutes.     The patient tolerated the procedure well and left the operating suite in satisfactory condition.     Dictated By Barber Harrison MD  d: 05/16/2025 09:25:37  t: 05/16/2025 10:56:53  [facility] [id number]  [facility]/    cc: MD Ralph Cherry DO

## 2025-05-16 NOTE — ANESTHESIA POSTPROCEDURE EVALUATION
Patient: Valeria Telles    Procedure Summary       Date: 05/16/25 Room / Location: [facility] MAIN OR  / [facility] MAIN OR    Anesthesia Start: 0720 Anesthesia Stop: 0906    Procedures:       Right endoscopic carpal tunnel release, right thumb and right ring finger trigger release (Right)      FINGER TRIGGER RELEASE (Right) Diagnosis:       Carpal tunnel syndrome on right      Trigger thumb of right hand      Trigger ring finger of right hand      (Carpal tunnel syndrome on right [G56.01])      (Trigger thumb of right hand [M65.311])      (Trigger ring finger of right hand [M65.341])    Surgeons: Barber Sebastian MD Anesthesiologist: Chandra Rao MD    Anesthesia Type: general ASA Status: 3            Anesthesia Type: general    Vitals Value Taken Time   /87 05/16/25 09:05   Temp 97.7 05/16/25 09:06   Pulse 72 05/16/25 09:05   Resp 13 05/16/25 09:05   SpO2 96 % 05/16/25 09:05   Vitals shown include unfiled device data.    [facility] AN Post Evaluation:   Patient Evaluated in PACU  Patient Participation: waiting for patient participation  Level of Consciousness: responsive to painful stimuli  Pain Score: 0  Pain Management: adequate  Airway Patency:patent  Dental exam unchanged from preop  Yes    Nausea/Vomiting: none  Cardiovascular Status: stable  Respiratory Status: nasal cannula, oral airway and spontaneous ventilation  Postoperative Hydration stable      Crystal Atendido, CRNA  5/16/2025 9:06 AM

## 2025-05-16 NOTE — ANESTHESIA PREPROCEDURE EVALUATION
Anesthesia PreOp Note    HPI:     Valeria Telles is a 64 year old female who presents for preoperative consultation requested by: Barber Sebastian MD    Date of Surgery: 5/16/2025    Procedure(s):  Right endoscopic carpal tunnel release, right thumb and right ring finger trigger release  FINGER TRIGGER RELEASE  Indication: Carpal tunnel syndrome on right [G56.01]  Trigger thumb of right hand [M65.311]  Trigger ring finger of right hand [M65.341]    Relevant Problems   No relevant active problems       NPO:  Last Liquid Consumption Date: 05/15/25  Last Liquid Consumption Time: 2200  Last Solid Consumption Date: 05/15/25  Last Solid Consumption Time: 1800  Last Liquid Consumption Date: 05/15/25          History Review:  Patient Active Problem List    Diagnosis Date Noted    Observation for suspected condition 11/22/2024    Depressive disorder 08/07/2023    Anxiety 08/07/2023    Osteoarthritis of fingers of both hands 09/11/2019    Trigger ring finger of left hand 09/11/2019    Bilateral carpal tunnel syndrome 09/11/2019    Encounter for therapeutic drug monitoring 11/19/2018    Hypercholesterolemia with hypertriglyceridemia 10/25/2018    Obesity (BMI 30-39.9) 10/25/2018    Controlled type 2 diabetes mellitus without complication, without long-term current use of insulin (HCC) 09/21/2018    Age-related nuclear cataract of both eyes 09/21/2018    Presbyopia 09/21/2018    Calcific tendinitis of right shoulder 01/22/2018    Uncontrolled type 2 diabetes mellitus with hyperglycemia, without long-term current use of insulin (HCC) 01/22/2018    Post-menopausal bleeding 01/22/2018    Urinary, incontinence, stress female 01/22/2018    Polyp of colon 11/28/2017    History of diverticulosis 11/28/2017    Essential hypertension 11/28/2017    Morbid obesity due to excess calories (HCC) 11/28/2017    Swelling, mass, or lump in head and neck 02/11/2014       Past Medical History[1]    Past Surgical History[2]    Prescriptions  Prior to Admission[3]  Current Medications and Prescriptions Ordered in Epic[4]    Allergies[5]    Family History[6]  Social Hx on file[7]    Available pre-op labs reviewed.     Lab Results   Component Value Date    PGLU 119 (H) 05/16/2025          Vital Signs:  Body mass index is 32.52 kg/m².   height is 1.499 m (4' 11\") and weight is 73 kg (161 lb). Her oral temperature is 98.2 °F (36.8 °C). Her blood pressure is 111/68 and her pulse is 74. Her respiration is 20.   Vitals:    05/15/25 1037 05/16/25 0603   BP:  111/68   Pulse:  74   Resp:  20   Temp:  98.2 °F (36.8 °C)   TempSrc:  Oral   Weight: 74.4 kg (164 lb) 73 kg (161 lb)   Height: 1.499 m (4' 11\") 1.499 m (4' 11\")        Anesthesia Evaluation     Patient summary reviewed and Nursing notes reviewed    History of anesthetic complications (PONV)   Airway   Mallampati: II  TM distance: >3 FB  Neck ROM: full  Dental - Dentition appears grossly intact     Pulmonary - normal exam   Cardiovascular - normal exam  Exercise tolerance: good  (+) hypertension well controlled    ROS comment: hyperlipidemia    Neuro/Psych    (+)  neuromuscular disease, anxiety/panic attacks,        GI/Hepatic/Renal - negative ROS     Endo/Other    (+) diabetes mellitus type 2 well controlled, hypothyroidism  Abdominal  - normal exam                 Anesthesia Plan:   ASA:  3  Plan:   General  Airway:  LMA  Post-op Pain Management: IV analgesics and Oral pain medication  Informed Consent Plan and Risks Discussed With:  Patient and spouse  Discussed plan with:  CRNA      I have informed Valeria Telles of the nature of the anesthetic plan, benefits, risks including possible dental damage if relevant, major complications, and any alternative forms of anesthetic management.   All of the patient's questions were answered to the best of my ability. The patient desires the anesthetic management as planned.  RADHAMES ACHARYA MD  5/16/2025 6:34 AM  Present on Admission:  **None**           [1]   Past  Medical History:   Anesthesia complication    Anesthesia complication    patient states she wakes up crying    Carpal tunnel syndrome, left    Colon polyp    Constipation    Diabetes (HCC)    Disorder of thyroid    DM2 (diabetes mellitus, type 2) (HCC)    Essential hypertension    High blood pressure    High cholesterol    Hx of motion sickness    Hyperlipidemia    Migraines    Obesity (BMI 30-39.9)    Osteoarthritis    PONV (postoperative nausea and vomiting)    Trigger finger, left little finger    Trigger finger, left middle finger   [2]   Past Surgical History:  Procedure Laterality Date          Colonoscopy N/A 2021    Procedure: COLONOSCOPY;  Surgeon: Juan Johnson MD;  Location: [facility] ENDOSCOPY    Dilation/curettage,diagnostic      Incise finger tendon sheath Left 2024    Left small finger and Left middle finger trigger releases    Reduction left          Reduction right          Wrist arthroscop,release xvers lig Left 2024    Left endoscopic carpal tunnel release   [3]   Medications Prior to Admission   Medication Sig Dispense Refill Last Dose/Taking    Multiple Vitamin (MULTIVITAMIN OR) Take 1 tablet by mouth daily.   2025    acetaminophen 500 MG Oral Tab Take 1 tablet (500 mg total) by mouth every 6 (six) hours as needed for Pain.   2025    diclofenac 1 % External Gel Apply 2 g topically 2 (two) times daily as needed. To the right outer hip 100 g 1 Past Week    atorvastatin 20 MG Oral Tab Take 1 tablet (20 mg total) by mouth nightly. 90 tablet 3 2025    metFORMIN  MG Oral Tablet 24 Hr Take 2 tablets (1,000 mg total) by mouth daily with breakfast AND 1 tablet (500 mg total) daily with dinner. 270 tablet 3 5/15/2025 Morning    lisinopril 5 MG Oral Tab Take 1 tablet (5 mg total) by mouth daily. 90 tablet 3 5/15/2025 Morning    levothyroxine 25 MCG Oral Tab Take 1 tablet (25 mcg total) by mouth before breakfast. 90 tablet 3 5/15/2025 Morning     hydroCHLOROthiazide 12.5 MG Oral Tab Take 1 tablet (12.5 mg total) by mouth daily. 90 tablet 3 5/15/2025 Morning    Insulin Pen Needle (BD PEN NEEDLE MAG U/F) 32G X 4 MM Does not apply Misc USE A NEW NEEDLE WITH EACH USE 1 Box 1     Blood Gluc Meter Disp-Strips Does not apply Device Needs 1 glucometer along with 100 test strips and 100 lancets with 11 refills each. Check sugars BID and prn. 1 Device 0    [4]   Current Facility-Administered Medications Ordered in Epic   Medication Dose Route Frequency Provider Last Rate Last Admin    lactated ringers infusion   Intravenous Continuous Hussainevicius Barber ALVA MD 20 mL/hr at 05/16/25 0613 New Bag at 05/16/25 0613     No current [facility]-ordered outpatient medications on file.   [5]   Allergies  Allergen Reactions    Iodine (Topical) RASH    Radiology Contrast Iodinated Dyes RASH   [6]   Family History  Problem Relation Age of Onset    Cancer Mother         Cancer - liver    Other (Other) Father         alzheimers    Diabetes Neg     Glaucoma Neg    [7]   Social History  Socioeconomic History    Marital status:    Tobacco Use    Smoking status: Never    Smokeless tobacco: Never   Vaping Use    Vaping status: Never Used   Substance and Sexual Activity    Alcohol use: Yes     Comment: rarely    Drug use: No   Other Topics Concern    Caffeine Concern Yes     Comment: Coffee, 1 cup daily    Exercise No

## 2025-05-17 ENCOUNTER — TELEPHONE (OUTPATIENT)
Dept: SURGERY | Facility: CLINIC | Age: 65
End: 2025-05-17

## 2025-05-17 NOTE — TELEPHONE ENCOUNTER
Left message for pt, post op call from surgery w/ Dr Harrison yesterday.  Per Dr Harrison pt reminded to keep right hand elevated in sling at all times, keep surgical dressings clean, dry and in place.  Reminded pt next OT appt on 5/19 at 10:30.  Instructed pt to call the office w/ any questions or concerns, [phone].  Dr Harrison notified.

## 2025-05-19 ENCOUNTER — OFFICE VISIT (OUTPATIENT)
Dept: SURGERY | Facility: CLINIC | Age: 65
End: 2025-05-19
Payer: COMMERCIAL

## 2025-05-19 DIAGNOSIS — M62.81 DISTAL MUSCLE WEAKNESS: Primary | ICD-10-CM

## 2025-05-19 PROCEDURE — 97110 THERAPEUTIC EXERCISES: CPT | Performed by: OCCUPATIONAL THERAPIST

## 2025-05-19 NOTE — PROGRESS NOTES
Carpal Tunnel Post - Op Note:    Subjective: I am doing well.      Objective:  Occupational Therapy completed the following educational areas status post elective carpal tunnel procedure:    1)  Dressing was removed and handwashing technique was reviewed using anti bacterial soap.    2)  Dressing was changed using x 2 cloth band aids and polysporin.    3)  Home exercise program reviewed and written handout provided for further reference:     A)   Tendon gliding exercises, x 10 reps per set, x 8 sets per day.     B)   AROM:   Full fisting exercises, x 20 reps per set, x 5 sets per day.     C)   Wrist flexion and extension exercises, x 20 reps per set, x 5 sets per day.    Note:  Instructed patient that exercises should not be painful.      Assessment: Patient verbalized and demonstrated independence with prescribed home exercise program and dressing change technique.    Plan: Patient will return to the clinic for suture removal as indicated by protocol.      Rogelio White  OTR/L

## 2025-05-28 ENCOUNTER — NURSE ONLY (OUTPATIENT)
Dept: SURGERY | Facility: CLINIC | Age: 65
End: 2025-05-28

## 2025-05-28 ENCOUNTER — OFFICE VISIT (OUTPATIENT)
Dept: SURGERY | Facility: CLINIC | Age: 65
End: 2025-05-28

## 2025-05-28 DIAGNOSIS — M62.81 DISTAL MUSCLE WEAKNESS: Primary | ICD-10-CM

## 2025-05-28 DIAGNOSIS — Z48.02 ENCOUNTER FOR REMOVAL OF SUTURES: Primary | ICD-10-CM

## 2025-05-28 DIAGNOSIS — M65.311 TRIGGER THUMB OF RIGHT HAND: ICD-10-CM

## 2025-05-28 DIAGNOSIS — G56.03 BILATERAL CARPAL TUNNEL SYNDROME: ICD-10-CM

## 2025-05-28 DIAGNOSIS — M65.341 TRIGGER RING FINGER OF RIGHT HAND: ICD-10-CM

## 2025-05-28 DIAGNOSIS — M25.641 JOINT STIFFNESS OF HAND, RIGHT: ICD-10-CM

## 2025-05-28 PROCEDURE — 97110 THERAPEUTIC EXERCISES: CPT | Performed by: OCCUPATIONAL THERAPIST

## 2025-05-28 PROCEDURE — 97165 OT EVAL LOW COMPLEX 30 MIN: CPT | Performed by: OCCUPATIONAL THERAPIST

## 2025-05-28 NOTE — PROGRESS NOTES
OCCUPATIONAL THERAPY EVALUATION:   Valeria Telles   [id number]       SUBJECTIVE:    HX of Injury: Right hand pain and numbness: Right thumb and RRF trigger releases.  Chief Complaint:  Minimal discomfort..    Precautions: 2 # lifting restriction with the right hand.  Premorbid Functional Status: Independent w/ Occ. duties, Independent w/ driving / sitting, Independent w/ ADL's  Current Level of Function: 2 # lifting restriction at work with the right hand  Employment: Temporary leave  Hand Dominance: right  Living Situation: Family  Barriers to Learning: None  Patient Goals: Full use of the right hand.    Imaging/Tests: EMG        OBJECTIVE DATA:   PAIN:   Rating (1/10): 1/10 at rest, 2/10 with activity  Location:     OBSERVATION:   Guarding movements    ORTHOTICS:    N/A    SCAR/INCISION: Flat, Non-adhered, and Flexible    SENSORY:  Right hand digit sensation has improved status post RECTR.      AROM/PROM:  (Degrees)  RIGHT HAND:    Thumb IF MF RF SF   MP Full right hand composite fist with no triggering.       PIP        DIP        CHISHOLM                  STRENGTH: (lbs) Right Average Left Average   : NT NT   2 pt Pinch:     3 pt Pinch:     Lateral Pinch:         ASSESSMENT & PLAN OF CARE:    Treatment Provided: Patient was seen for an initial evaluation, hand washing : Following suture removal by nursing, OT reviewed cold cream scar massage technique.  HEP:  AROM, Tendon glides, x 20 reps per set, x 5 sets daily. Reviewed hand elevation importance. Written handout was provided to reinforce today's treatment and educational session.       Rehabilitation Potential: Excellent    CLINICAL ASSESSMENT:    Patient/Caregiver Education Provided: Yes    Treatment Plan:  Therapeutic Exercise  Therapeutic Activities  Manual Therapy  Scar Management  Patient/Family Education    GOALS:  Short term goals to be reached in x 1 session:    1) Independent with HEP..      Long term goals to be reached in x 2 - 3 weeks:    1) Full  functional use of the involved extremity for self-care, leisure and work related tasks:.        Patient will be seen 1 x /week for 3 weeks or a total of 3 visits.   Pt. was advised regarding the findings of this evaluation and agrees to the plan of care.     Rogelio HENDERSON, OTRL

## 2025-05-28 NOTE — PROGRESS NOTES
Surgery 1: LECTR, LMF/LSFtrigger  - Date: 11/22/24  - Days Since: 187    Surgery 2: RECTR, R TH / RRF trigger  - Date: 05/16/25  - Days Since: 12    Pt here for suture/staple removal to R wrist, RTH, and RRF  Pt identified w/2 identifiers and orders verified.  Pt presents w/band aid C/D/I.  Pt denies c/o pain, use of analgesics, and s/s infection.  Band aid removed carefully.  Running stitch/staples C/D/I.  Incisions appears to be healing well, edges well approximated.  Running stitch/staples removed without difficulty and pt tolerated procedure well.  Site cleansed w/soap and water and escorted to OT  Pt reminded of f/u appt w/MD on 6/5.  Pt instructed to call the office w/any further questions and/or concerns.  Dr. Harrison notified.     SR R wrist RTH RRF

## 2025-05-29 ENCOUNTER — TELEPHONE (OUTPATIENT)
Dept: PHYSICAL MEDICINE AND REHAB | Facility: CLINIC | Age: 65
End: 2025-05-29

## 2025-05-29 DIAGNOSIS — M16.11 PRIMARY OSTEOARTHRITIS OF RIGHT HIP: Primary | ICD-10-CM

## 2025-05-29 NOTE — TELEPHONE ENCOUNTER
Pt called to say that the gel inj had little to no effect and that she is ready to take the next step and do the Epidural inj . Pt requests that an order be placed for Epidural inj.

## 2025-05-30 NOTE — TELEPHONE ENCOUNTER
Per Dr. Griffith's LOV (05/12/2025) note: \"She is requesting another hip injection. In the interim she is getting hand surgery by Dr. Harrison. I asked her to get approval from Dr. Harrison for steroids in the perisurgical time period. She will let me know. Otherwise I recommend that she resume aquatic exercises.\"    RN reviewed all previous/current documentation with Dr. Harrison office. No mention of steroid clearance given at this time.    Patient noting that on the day of her surgery on 05/16/2025 Dr. Harrison cleared her for steroids. Could not find mention of clearance from Dr. Harrison in that encounter.       Pt's request routed to Dr. Griffith for his review/recommendations.

## 2025-06-02 ENCOUNTER — OFFICE VISIT (OUTPATIENT)
Dept: SURGERY | Facility: CLINIC | Age: 65
End: 2025-06-02

## 2025-06-02 DIAGNOSIS — M62.81 DISTAL MUSCLE WEAKNESS: Primary | ICD-10-CM

## 2025-06-02 DIAGNOSIS — M25.641 JOINT STIFFNESS OF HAND, RIGHT: ICD-10-CM

## 2025-06-02 PROCEDURE — 97110 THERAPEUTIC EXERCISES: CPT | Performed by: OCCUPATIONAL THERAPIST

## 2025-06-02 NOTE — PROGRESS NOTES
Subjective: I get some cramps in my right hand once in awhile.      Objective:     Current level of performance:  ADL: Independent  Work: On leave  Leisure: Family    Measurements/Tests:  ROM:         Full right hand composite fist.         Treatment Provided this day: Reviewed therapeutic exercise:  AROM:   X 20 reps per set, x 5 sets daily:    Tendon glides:  X 10 reps per set, x 8 sets daily:    PROM:  To all digits:  X 15 reps each digit per set, x 5 sets daily:  Reviewed cold cream scar massage technique.    Treatment Time: 20 minutes      Summary/Analysis of Treatment session: Progressing well with OT goals and objectives.      Plan: Full use of the right hand in x 2 - 3 weeks.      Follow up in:  06/05/2025          Rogelio Macias  OTR/SARAH

## 2025-06-02 NOTE — TELEPHONE ENCOUNTER
Initiated authorization for Right hip injection under fluoroscopy. CPT/HCPCS 73564, 24380 dx:M16.11 with [employer] automated line.    Status: No auth required  Reference/Authorization # [id number]    Authorization is not a guarantee of payment and may be subject to review once claim is submitted.     PLEASE INFORM THE PATIENT TO CONTACT THE OFFICE IF THE INSURANCE CHANGES AS HE/SHE IS RESPONSIBLE TO UPDATE THE OFFICE IF INSURANCE CHANGES SO THAT PROCEDURE IS BILLED CORRECTLY.

## 2025-06-04 ENCOUNTER — HOSPITAL ENCOUNTER (OUTPATIENT)
Dept: GENERAL RADIOLOGY | Facility: HOSPITAL | Age: 65
Discharge: HOME | End: 2025-06-04
Attending: PHYSICAL MEDICINE & REHABILITATION
Payer: COMMERCIAL

## 2025-06-04 DIAGNOSIS — M16.11 PRIMARY OSTEOARTHRITIS OF RIGHT HIP: ICD-10-CM

## 2025-06-04 PROCEDURE — 20610 DRAIN/INJ JOINT/BURSA W/O US: CPT | Performed by: PHYSICAL MEDICINE & REHABILITATION

## 2025-06-04 PROCEDURE — 77002 NEEDLE LOCALIZATION BY XRAY: CPT | Performed by: PHYSICAL MEDICINE & REHABILITATION

## 2025-06-04 RX ORDER — LIDOCAINE HYDROCHLORIDE 10 MG/ML
INJECTION, SOLUTION EPIDURAL; INFILTRATION; INTRACAUDAL; PERINEURAL
Status: COMPLETED
Start: 2025-06-04 | End: 2025-06-04

## 2025-06-04 RX ORDER — IOPAMIDOL 755 MG/ML
10 INJECTION, SOLUTION INTRAVASCULAR
Status: COMPLETED | OUTPATIENT
Start: 2025-06-04 | End: 2025-06-04

## 2025-06-04 RX ORDER — TRIAMCINOLONE ACETONIDE 40 MG/ML
INJECTION, SUSPENSION INTRA-ARTICULAR; INTRAMUSCULAR
Status: COMPLETED
Start: 2025-06-04 | End: 2025-06-04

## 2025-06-04 RX ORDER — TRIAMCINOLONE ACETONIDE 40 MG/ML
40 INJECTION, SUSPENSION INTRA-ARTICULAR; INTRAMUSCULAR ONCE
Status: COMPLETED | OUTPATIENT
Start: 2025-06-04 | End: 2025-06-04

## 2025-06-04 RX ORDER — LIDOCAINE HYDROCHLORIDE 10 MG/ML
5 INJECTION, SOLUTION EPIDURAL; INFILTRATION; INTRACAUDAL; PERINEURAL ONCE
Status: COMPLETED | OUTPATIENT
Start: 2025-06-04 | End: 2025-06-04

## 2025-06-04 RX ADMIN — LIDOCAINE HYDROCHLORIDE 5 ML: 10 INJECTION, SOLUTION EPIDURAL; INFILTRATION; INTRACAUDAL; PERINEURAL at 09:15:00

## 2025-06-04 RX ADMIN — TRIAMCINOLONE ACETONIDE 40 MG: 40 INJECTION, SUSPENSION INTRA-ARTICULAR; INTRAMUSCULAR at 09:15:00

## 2025-06-04 RX ADMIN — IOPAMIDOL 1 ML: 755 INJECTION, SOLUTION INTRAVASCULAR at 09:02:00

## 2025-06-04 NOTE — PROCEDURES
Preoperative Diagnosis: Hip osteoarthritis    Postoperative Diagnosis: Hip osteoarthritis    Procedures:  Right hip injection(s) under fluoroscopic guidance and contrast enhancement.    Surgeon:  Kenny Griffith M.D.    Anesthesia:  Local    OPERATIVE PROCEDURE:  The patient was consented.  She was brought into the fluoroscopy suite and placed on the table in the supine position.  She was sterilely prepped and draped in routine fashion.  The hip joint(s) were identified.  The overlying skin was anesthetized.     A 22-gauge spinal needle was introduced along the intertrochanteric line and advanced along the femoral neck, contacting bone near the head-neck junction. Needle position was verified using fluoroscopy and radiographic contrast showing a hip arthrogram.  Radiographic interpretation was that the needle was in proper position for hip injection(s).  I placed a mixture of 1.0mL of 40mg/ml Kenalog and 5.0mL of 1% preservative-free lidocaine into each joint.    The patient tolerated the procedure well without any immediate complications.    She was given discharge instructions and is to follow up with me in approximately two weeks.

## 2025-06-05 ENCOUNTER — OFFICE VISIT (OUTPATIENT)
Dept: SURGERY | Facility: CLINIC | Age: 65
End: 2025-06-05

## 2025-06-05 DIAGNOSIS — M62.81 DISTAL MUSCLE WEAKNESS: Primary | ICD-10-CM

## 2025-06-05 DIAGNOSIS — M65.341 TRIGGER RING FINGER OF RIGHT HAND: ICD-10-CM

## 2025-06-05 DIAGNOSIS — G56.03 BILATERAL CARPAL TUNNEL SYNDROME: Primary | ICD-10-CM

## 2025-06-05 DIAGNOSIS — M25.641 JOINT STIFFNESS OF HAND, RIGHT: ICD-10-CM

## 2025-06-05 DIAGNOSIS — M65.311 TRIGGER THUMB OF RIGHT HAND: ICD-10-CM

## 2025-06-05 PROCEDURE — 99024 POSTOP FOLLOW-UP VISIT: CPT | Performed by: PLASTIC SURGERY

## 2025-06-05 PROCEDURE — 97110 THERAPEUTIC EXERCISES: CPT | Performed by: OCCUPATIONAL THERAPIST

## 2025-06-05 NOTE — PROGRESS NOTES
Surgery 1: LECTR, SUSYF/LSFtrigger  - Date: 11/22/24  - Days Since: 195    Surgery 2: RECTR, R TH / RRF trigger  - Date: 05/16/25  - Days Since: 20    20 days postop  No complaints.  No pain.  No triggering  She is pleased with her result  Numbness almost completely gone    Her hand feels weak    Full painless range of motion  Scar is healing well  No triggering    Strength 10 versus 25    Continue use of massage  Strengthening and OT    Return to regular work 2 weeks, 6/23

## 2025-06-13 ENCOUNTER — TELEPHONE (OUTPATIENT)
Dept: PHYSICAL MEDICINE AND REHAB | Facility: CLINIC | Age: 65
End: 2025-06-13

## 2025-06-13 NOTE — TELEPHONE ENCOUNTER
Patient is calling to let us know she is in a lot of pain since her last injection she would like a call back asap for she can talk to someone regarding this matter [phone]

## 2025-06-13 NOTE — TELEPHONE ENCOUNTER
Incoming call from patient stating she is still having some pain after injection completed last Wednesday and is wondering if she needs to get another injection.     On 6/4/25, patient had Right hip injection(s) under fluoroscopic guidance and contrast enhancement.     RN explained to patient that after an injection, your usual pain should gradually decrease in 2-3 days, but relief may take up to two weeks after your procedure. A temporary flare-up (or increase) of pain for 1-3 days after a procedure is also normal. RN encouraged patient to apply ice for 15 minutes every hour, continue medications as prescribed and tylenol to help relieve pain, and continue exercising.       Patient verbalized understanding and agreed to call back in a week if she has not experienced any relief.

## 2025-06-16 NOTE — TELEPHONE ENCOUNTER
Patient called back with additional update. States a couple days after the 6/4/25 injection she started experiencing cramping to her right leg from the knee down. States this is new and it has been going on for the past week. She has been using 4 Advil at a time and using a compression sock, but nothing is helping.     Per patient no redness or swelling to the right lower leg.     Per patient pain is only from the knee down and in the calf muscle and the front by her ankle.    States she can see the tightness of her muscle.     The cramping goes away when she lays down and elevates her leg, but walking or moving around the cramping is then constant.     States she has to return to work on 6/23/25, but she cannot return in this condition.     Next office visit: none scheduled    Patient asking for medication or recommendations from  to help this cramping.     Message sent to  to advise.

## 2025-06-16 NOTE — TELEPHONE ENCOUNTER
Since this is a new symptom she will need a follow-up.  Can double book her tomorrow at 1130.  I cannot give her oral steroids due to diabetes.

## 2025-06-16 NOTE — TELEPHONE ENCOUNTER
Called and informed patient of Dr. Griffith's recommendations. Patient agreeable to appointment 6/17/25 in [place] with Dr. Griffith. Scheduled per protocol.

## 2025-06-17 ENCOUNTER — HOSPITAL ENCOUNTER (OUTPATIENT)
Dept: GENERAL RADIOLOGY | Facility: HOSPITAL | Age: 65
Discharge: HOME | End: 2025-06-17
Attending: PHYSICAL MEDICINE & REHABILITATION
Payer: COMMERCIAL

## 2025-06-17 ENCOUNTER — TELEPHONE (OUTPATIENT)
Dept: PHYSICAL MEDICINE AND REHAB | Facility: CLINIC | Age: 65
End: 2025-06-17

## 2025-06-17 ENCOUNTER — OFFICE VISIT (OUTPATIENT)
Dept: PHYSICAL MEDICINE AND REHAB | Facility: CLINIC | Age: 65
End: 2025-06-17
Payer: COMMERCIAL

## 2025-06-17 VITALS — HEIGHT: 59 IN | BODY MASS INDEX: 32.46 KG/M2 | WEIGHT: 161 LBS

## 2025-06-17 DIAGNOSIS — E11.9 CONTROLLED TYPE 2 DIABETES MELLITUS WITHOUT COMPLICATION, WITHOUT LONG-TERM CURRENT USE OF INSULIN (HCC): ICD-10-CM

## 2025-06-17 DIAGNOSIS — M16.11 PRIMARY OSTEOARTHRITIS OF RIGHT HIP: Primary | ICD-10-CM

## 2025-06-17 DIAGNOSIS — E66.9 OBESITY (BMI 30-39.9): ICD-10-CM

## 2025-06-17 DIAGNOSIS — F41.9 ANXIETY: ICD-10-CM

## 2025-06-17 DIAGNOSIS — M54.16 LUMBAR RADICULOPATHY: ICD-10-CM

## 2025-06-17 DIAGNOSIS — F32.A DEPRESSIVE DISORDER: ICD-10-CM

## 2025-06-17 PROCEDURE — 99214 OFFICE O/P EST MOD 30 MIN: CPT | Performed by: PHYSICAL MEDICINE & REHABILITATION

## 2025-06-17 PROCEDURE — 72100 X-RAY EXAM L-S SPINE 2/3 VWS: CPT | Performed by: PHYSICAL MEDICINE & REHABILITATION

## 2025-06-17 PROCEDURE — 3008F BODY MASS INDEX DOCD: CPT | Performed by: PHYSICAL MEDICINE & REHABILITATION

## 2025-06-17 RX ORDER — METHYLPREDNISOLONE 4 MG/1
TABLET ORAL
Qty: 1 EACH | Refills: 0 | Status: SHIPPED | OUTPATIENT
Start: 2025-06-17

## 2025-06-17 RX ORDER — TRAMADOL HYDROCHLORIDE 50 MG/1
50 TABLET ORAL EVERY 6 HOURS PRN
Qty: 60 TABLET | Refills: 0 | Status: SHIPPED | OUTPATIENT
Start: 2025-06-17

## 2025-06-17 NOTE — PROGRESS NOTES
[facility]  Progress Note    CHIEF COMPLAINT:    Chief Complaint   Patient presents with    Follow - Up     LOV 05/12/25  Patient is here to follow up on right hip pain. Patient states the pain is a constant aching and throbbing in the right thigh and shin, Pain 6/10. Denies N/T. Admits weakness. Denies PT. Admits Advil taken PRN.        History of Present Illness:  Valeria Telles is a 64 year old female who presents today for follow up for symptoms of severe right hip OA.  She is status post a right hip injection which unfortunately provided  no real help. Today she complains of new symptoms consisting of cramping of her right leg from the knee down. States this is new and it has been going on for the past week. She has been using 4 Advil at a time and using a compression sock, but nothing is helping. Per patient no redness or swelling to the right lower leg.  The pain is from the knee down, in the calf muscle and anterior shin.  She states she can see the muscle tightening.  Symptoms go away when she lays down and elevates her leg, but walking or ambulating exacerbates the pain.  She states she has to return to work on 6/23/25, but she cannot return in this condition.         PAST MEDICAL HISTORY:  Past Medical History[1]    SURGICAL HISTORY:  Past Surgical History[2]    SOCIAL HISTORY:   Social History     Occupational History    Not on file   Tobacco Use    Smoking status: Never    Smokeless tobacco: Never   Vaping Use    Vaping status: Never Used   Substance and Sexual Activity    Alcohol use: Yes     Comment: rarely    Drug use: No    Sexual activity: Not on file       CURRENT MEDICATIONS:   Current Medications[3]    ALLERGIES:   Allergies[4]        PHYSICAL EXAM:   Ht 59\"   Wt 161 lb (73 kg)   LMP 09/01/2014 (Approximate)   BMI 32.52 kg/m²     Body mass index is 32.52 kg/m².      General: No immediate distress  Extremities: No lower extremity edema bilaterally    Spine: full and painfree lumbar ROM in all directions  Hips: Painful right hip range of motion  Neuro:   Cognition: alert & oriented x 3, attentive, able to follow 2 step commands, comprehention intact, spontaneous speech intact  Strength: Lower extremities have 5/5 strength  Sensation: Normal lower extremities  Reflexes: Normal lower extremities  SLR: Leg discomfort with straight leg raising        Data    Radiology Imaging:  I reviewed plain film x-rays of the hips showing moderate to severe right hip osteoarthritis with an inferior femoral bone spur, decreased joint space, subchondral cysts.       ASSESSMENT AND PLAN:  1. Primary osteoarthritis of right hip  Complicated presentation.  She has obvious right hip osteoarthritis which may require total hip arthroplasty.  She will do pool rx at home.  Will hold off on repeat injection until diagnosis can be further clarified.    2. Lumbar radiculopathy  L5 radic vs compensation for antalgia?  I started her on Medrol, tramadol and she will RTC 3 weeks.  Will also obtain a plain film x-ray.  MRI if no better next time  - XR LUMBAR SPINE (MIN 2 VIEWS) (CPT=72100); Future  - traMADol 50 MG Oral Tab; Take 1 tablet (50 mg total) by mouth every 6 (six) hours as needed for Pain.  Dispense: 60 tablet; Refill: 0    3. Controlled type 2 diabetes mellitus without complication, without long-term current use of insulin (HCC)  Neuropathy?  Avoiding steroids if possible    4. Obesity (BMI 30-39.9)  Negative comorbidity for back and lower extremity symptoms    5. Anxiety  Negative morbidity for painful conditions    6. Depressive disorder  Negative morbidity for painful conditions      The patient was in agreement with the assessment and plan.  All questions were answered.        Kenny Griffith MD  Physical Medicine and Rehabilitation/Sports Medicine  [facility]           [1]   Past Medical History:   Anesthesia complication    Anesthesia complication     patient states she wakes up crying    Carpal tunnel syndrome, left    Carpal tunnel syndrome, right    Colon polyp    Constipation    Diabetes (HCC)    Disorder of thyroid    DM2 (diabetes mellitus, type 2) (HCC)    Essential hypertension    High blood pressure    High cholesterol    Hx of motion sickness    Hyperlipidemia    Migraines    Obesity (BMI 30-39.9)    Osteoarthritis    PONV (postoperative nausea and vomiting)    Trigger finger, left little finger    Trigger finger, left middle finger    Trigger finger, right ring finger    Trigger thumb of right hand   [2]   Past Surgical History:  Procedure Laterality Date          Colonoscopy N/A 2021    Procedure: COLONOSCOPY;  Surgeon: Juan Johnson MD;  Location: [facility] ENDOSCOPY    Dilation/curettage,diagnostic      Incise finger tendon sheath Left 2024    Left small finger and Left middle finger trigger releases    Incise finger tendon sheath Right 2025    Right ring finger and Right thumb trigger releases    Reduction left          Reduction right          Wrist arthroscop,release xvers lig Left 2024    Left endoscopic carpal tunnel release    Wrist arthroscop,release xvers lig Right 2025    Right endoscopic carpal tunnel release   [3]   Current Outpatient Medications   Medication Sig Dispense Refill    methylPREDNISolone (MEDROL) 4 MG Oral Tablet Therapy Pack Take as directed 1 each 0    traMADol 50 MG Oral Tab Take 1 tablet (50 mg total) by mouth every 6 (six) hours as needed for Pain. 60 tablet 0    Multiple Vitamin (MULTIVITAMIN OR) Take 1 tablet by mouth daily.      acetaminophen 500 MG Oral Tab Take 1 tablet (500 mg total) by mouth every 6 (six) hours as needed for Pain.      diclofenac 1 % External Gel Apply 2 g topically 2 (two) times daily as needed. To the right outer hip 100 g 1    atorvastatin 20 MG Oral Tab Take 1 tablet (20 mg total) by mouth nightly. 90 tablet 3    metFORMIN  MG Oral  Tablet 24 Hr Take 2 tablets (1,000 mg total) by mouth daily with breakfast AND 1 tablet (500 mg total) daily with dinner. 270 tablet 3    lisinopril 5 MG Oral Tab Take 1 tablet (5 mg total) by mouth daily. 90 tablet 3    levothyroxine 25 MCG Oral Tab Take 1 tablet (25 mcg total) by mouth before breakfast. 90 tablet 3    hydroCHLOROthiazide 12.5 MG Oral Tab Take 1 tablet (12.5 mg total) by mouth daily. 90 tablet 3    Insulin Pen Needle (BD PEN NEEDLE MAG U/F) 32G X 4 MM Does not apply Misc USE A NEW NEEDLE WITH EACH USE 1 Box 1    Blood Gluc Meter Disp-Strips Does not apply Device Needs 1 glucometer along with 100 test strips and 100 lancets with 11 refills each. Check sugars BID and prn. 1 Device 0   [4]   Allergies  Allergen Reactions    Iodine (Topical) RASH    Radiology Contrast Iodinated Dyes RASH

## 2025-06-23 ENCOUNTER — TELEPHONE (OUTPATIENT)
Dept: SURGERY | Facility: CLINIC | Age: 65
End: 2025-06-23

## 2025-06-23 ENCOUNTER — PATIENT MESSAGE (OUTPATIENT)
Dept: FAMILY MEDICINE CLINIC | Facility: CLINIC | Age: 65
End: 2025-06-23

## 2025-06-23 NOTE — TELEPHONE ENCOUNTER
Spoke with pt.  Pt requested we send new work status report to her home address to return to work 6/23/25.   Done

## 2025-07-07 ENCOUNTER — OFFICE VISIT (OUTPATIENT)
Dept: PHYSICAL MEDICINE AND REHAB | Facility: CLINIC | Age: 65
End: 2025-07-07
Payer: COMMERCIAL

## 2025-07-07 VITALS — HEIGHT: 59 IN | BODY MASS INDEX: 32.66 KG/M2 | WEIGHT: 162 LBS

## 2025-07-07 DIAGNOSIS — E66.9 OBESITY (BMI 30-39.9): ICD-10-CM

## 2025-07-07 DIAGNOSIS — M54.16 LUMBAR RADICULOPATHY: ICD-10-CM

## 2025-07-07 DIAGNOSIS — E11.9 CONTROLLED TYPE 2 DIABETES MELLITUS WITHOUT COMPLICATION, WITHOUT LONG-TERM CURRENT USE OF INSULIN (HCC): ICD-10-CM

## 2025-07-07 DIAGNOSIS — F32.A DEPRESSIVE DISORDER: ICD-10-CM

## 2025-07-07 DIAGNOSIS — M16.11 PRIMARY OSTEOARTHRITIS OF RIGHT HIP: Primary | ICD-10-CM

## 2025-07-07 DIAGNOSIS — F41.9 ANXIETY: ICD-10-CM

## 2025-07-07 PROCEDURE — 99213 OFFICE O/P EST LOW 20 MIN: CPT | Performed by: PHYSICAL MEDICINE & REHABILITATION

## 2025-07-07 PROCEDURE — 3008F BODY MASS INDEX DOCD: CPT | Performed by: PHYSICAL MEDICINE & REHABILITATION

## 2025-07-07 NOTE — PROGRESS NOTES
[facility]  Progress Note    CHIEF COMPLAINT:    Chief Complaint   Patient presents with    Follow - Up     LOV 6/17/25 patient here to follow up on right hip pain. Completed xray. Completed medrol dose miguelito which provided relief until about 3 days ago, c/o cramping and pain in her right leg. Has not taken tramadol, has taken advil with some relief. Has been doing exercises in her pool which she thinks is helpful while doing them. LOP 3/10       History of Present Illness:  Valeria Telles is a 64 year old female who presents today for follow up for symptoms of severe right hip arthritis.  At her last visit she started complaining of right leg cramping below the knee.  At her last visit we started her on Medrol and tramadol, also obtained a plain film x-ray.  She requested lidocaine patches since last time, she has been off work as well, getting papers filled out by primary care.  I also ordered a lumbar MRI on June 23.  She tells me it is scheduled for late July.  Symptoms are unchanged with cramping in the right leg in addition to severe hip pain.    PAST MEDICAL HISTORY:  Past Medical History[1]    SURGICAL HISTORY:  Past Surgical History[2]    SOCIAL HISTORY:   Social History     Occupational History    Not on file   Tobacco Use    Smoking status: Never    Smokeless tobacco: Never    Tobacco comments:     Never smoke...   Vaping Use    Vaping status: Never Used   Substance and Sexual Activity    Alcohol use: Not Currently     Alcohol/week: 1.0 standard drink of alcohol     Types: 1 Glasses of wine per week     Comment: Once a month    Drug use: No    Sexual activity: Not on file       CURRENT MEDICATIONS:   Current Medications[3]    ALLERGIES:   Allergies[4]        PHYSICAL EXAM:   Ht 59\"   Wt 162 lb (73.5 kg)   LMP 09/01/2014 (Approximate)   BMI 32.72 kg/m²     Body mass index is 32.72 kg/m².      Unchanged      Data    Radiology Imaging:  I personally reviewed a  plain film x-ray of the lumbar spine showing facet arthropathy, disc degeneration at L3-4 and L5-S1, reasonably good alignment, numerous endplate osteophytes       ASSESSMENT AND PLAN:  1. Primary osteoarthritis of right hip  Severe, will need this replaced.    2. Lumbar radiculopathy  Awaiting lumbar MRI, I gave her options outside the [facility] system to expedite the MRI    3. Controlled type 2 diabetes mellitus without complication, without long-term current use of insulin (HCC)  Caution with steroids, limits treatment options    4. Obesity (BMI 30-39.9)  Negative comorbidity for back and lower extremity complaints    5. Anxiety  Negative comorbidity for painful symptoms    6. Depressive disorder  Negative comorbidity for painful symptoms        RTC for MRI review      The patient was in agreement with the assessment and plan.  All questions were answered.        Kenny Griffith MD  Physical Medicine and Rehabilitation/Sports Medicine  [facility]           [1]   Past Medical History:   Anesthesia complication    Anesthesia complication    patient states she wakes up crying    Carpal tunnel syndrome, left    Carpal tunnel syndrome, right    Colon polyp    Constipation    Diabetes (HCC)    Disorder of thyroid    DM2 (diabetes mellitus, type 2) ([facility])    Essential hypertension    High blood pressure    High cholesterol    Hx of motion sickness    Hyperlipidemia    Migraines    Obesity (BMI 30-39.9)    Osteoarthritis    PONV (postoperative nausea and vomiting)    Trigger finger, left little finger    Trigger finger, left middle finger    Trigger finger, right ring finger    Trigger thumb of right hand   [2]   Past Surgical History:  Procedure Laterality Date          Colonoscopy N/A 2021    Procedure: COLONOSCOPY;  Surgeon: Juan Johnson MD;  Location: [facility] ENDOSCOPY    Dilation/curettage,diagnostic      Incise finger tendon sheath Left 2024    Left small finger and  Left middle finger trigger releases    Incise finger tendon sheath Right 05/16/2025    Right ring finger and Right thumb trigger releases    Reduction left      2000    Reduction right      2000    Wrist arthroscop,release xvers lig Left 11/22/2024    Left endoscopic carpal tunnel release    Wrist arthroscop,release xvers lig Right 05/16/2025    Right endoscopic carpal tunnel release   [3]   Current Outpatient Medications   Medication Sig Dispense Refill    lidocaine 5 % External Patch Place 1 patch onto the skin daily. 30 patch 2    methylPREDNISolone (MEDROL) 4 MG Oral Tablet Therapy Pack Take as directed 1 each 0    traMADol 50 MG Oral Tab Take 1 tablet (50 mg total) by mouth every 6 (six) hours as needed for Pain. 60 tablet 0    Multiple Vitamin (MULTIVITAMIN OR) Take 1 tablet by mouth daily.      acetaminophen 500 MG Oral Tab Take 1 tablet (500 mg total) by mouth every 6 (six) hours as needed for Pain.      diclofenac 1 % External Gel Apply 2 g topically 2 (two) times daily as needed. To the right outer hip 100 g 1    atorvastatin 20 MG Oral Tab Take 1 tablet (20 mg total) by mouth nightly. 90 tablet 3    metFORMIN  MG Oral Tablet 24 Hr Take 2 tablets (1,000 mg total) by mouth daily with breakfast AND 1 tablet (500 mg total) daily with dinner. 270 tablet 3    lisinopril 5 MG Oral Tab Take 1 tablet (5 mg total) by mouth daily. 90 tablet 3    levothyroxine 25 MCG Oral Tab Take 1 tablet (25 mcg total) by mouth before breakfast. 90 tablet 3    hydroCHLOROthiazide 12.5 MG Oral Tab Take 1 tablet (12.5 mg total) by mouth daily. 90 tablet 3    Insulin Pen Needle (BD PEN NEEDLE MAG U/F) 32G X 4 MM Does not apply Misc USE A NEW NEEDLE WITH EACH USE 1 Box 1    Blood Gluc Meter Disp-Strips Does not apply Device Needs 1 glucometer along with 100 test strips and 100 lancets with 11 refills each. Check sugars BID and prn. 1 Device 0   [4]   Allergies  Allergen Reactions    Iodine (Topical) RASH    Radiology Contrast  Iodinated Dyes RASH

## 2025-07-11 ENCOUNTER — HOSPITAL ENCOUNTER (OUTPATIENT)
Dept: MRI IMAGING | Age: 65
Discharge: HOME | End: 2025-07-11
Attending: PHYSICAL MEDICINE & REHABILITATION
Payer: COMMERCIAL

## 2025-07-11 DIAGNOSIS — M54.16 LUMBAR RADICULOPATHY: ICD-10-CM

## 2025-07-11 PROCEDURE — 72148 MRI LUMBAR SPINE W/O DYE: CPT | Performed by: PHYSICAL MEDICINE & REHABILITATION

## (undated) DEVICE — GLOVE SUR 7 SENSICARE PI MIC PIP CRM PWD F

## (undated) DEVICE — DISPOSABLE TOURNIQUET CUFF DUAL BLADDER, DUAL PORT AND QUICK CONNECT CONNECTOR: Brand: COLOR CUFF

## (undated) DEVICE — 35 ML SYRINGE REGULAR TIP: Brand: MONOJECT

## (undated) DEVICE — MEDI-VAC NON-CONDUCTIVE SUCTION TUBING 6MM X 1.8M (6FT.) L: Brand: CARDINAL HEALTH

## (undated) DEVICE — GOWN,SIRUS,FABRNF,RAGLAN,L,ST,30/CS: Brand: MEDLINE

## (undated) DEVICE — SUT ETHLN 4-0 18IN P-3 NABSRB BLK 13MM 3/8 CI

## (undated) DEVICE — HYSTEROSCOPY: Brand: MEDLINE INDUSTRIES, INC.

## (undated) DEVICE — SNARE OPTMZ PLPCTM TRP

## (undated) DEVICE — LINE MNTR ADLT SET O2 INTMD

## (undated) DEVICE — PLASTIC HAND: Brand: MEDLINE INDUSTRIES, INC.

## (undated) DEVICE — ECTRA II PROCEDURE KIT,                                    SINGLE-USE, DISPOSABLE. CONTENTS                                    PROBE KNIFE, RETROGRADE KNIFE,                                    TRIANGLE KNIFE, HAND PAD, SWABS: Brand: ECTRA

## (undated) DEVICE — STERILE LATEX POWDER-FREE SURGICAL GLOVESWITH NITRILE COATING: Brand: PROTEXIS

## (undated) DEVICE — Device: Brand: DEFENDO AIR/WATER/SUCTION AND BIOPSY VALVE

## (undated) DEVICE — SOL  .9 3000ML

## (undated) DEVICE — GAUZE,SPONGE,FLUFF,6"X6.75",STRL,5/TRAY: Brand: MEDLINE

## (undated) DEVICE — GOWN,SIRUS,FAB REINF,RAGLAN,L,STERILE: Brand: MEDLINE

## (undated) DEVICE — MEDI-VAC NON-CONDUCTIVE SUCTION TUBING: Brand: CARDINAL HEALTH

## (undated) DEVICE — PACK CDS PLASTIC HAND

## (undated) DEVICE — SOLUTION IRRIG 1000ML 0.9% NACL USP BTL

## (undated) DEVICE — SNARE ENDOSCOPIC 10MM ROUND

## (undated) DEVICE — Device: Brand: CUSTOM PROCEDURE KIT

## (undated) DEVICE — WOLF INFLOW HYSTER

## (undated) DEVICE — SUT ETHLN 4-0 18IN PS-2 NABSRB BLK 19MM 3/8 C

## (undated) NOTE — LETTER
[facility] X-RAY  [address] [address]  [phone]  [phone]  Authorization for Imaging Procedure  Date of Procedure: 6/4/2025    I hereby authorize Dr. Griffith, my physician and his/her assistants (if applicable), which may include medical students, residents, and/or fellows, to perform the following procedure and administer such anesthesia as may be determined necessary by my physician: XR ASPIR/INJ MAJOR JOINT W/FLUORO RT(CPT=77002/18818) on Valeria Telles.   2.  I recognize that during the procedure, unforeseen conditions may necessitate additional or different procedures than those listed above. I, therefore, further authorize and request that the above-named physician, assistants, or designees perform such procedures as are, in their judgment, necessary and desirable.    3.  My physician has discussed prior to my procedure the potential benefits, risks and side effects of this procedure; the likelihood of achieving goals; and potential problems that might occur during recuperation. They also discussed reasonable alternatives to the procedure, including risks, benefits, and side effects related to the alternatives and risks related to not receiving this procedure. I have had all my questions answered and I acknowledge that no guarantee has been made as to the result that may be obtained.    4.  Should the need arise during my procedure, which includes change of level of care prior to discharge, I also consent to the administration of blood and/or blood products. Further, I understand that despite careful testing and screening of blood or blood products by collecting agencies, I may still be subject to ill effects as a result of receiving a blood transfusion and/or blood products. The following are some, but not all, of the potential risks that can occur: fever and allergic reactions, hemolytic reactions, transmission of diseases such as Hepatitis, AIDS and Cytomegalovirus (CMV) and  fluid overload. In the event that I wish to have an autologous transfusion of my own blood, or a directed donor transfusion, I will discuss this with my physician.  Check only if Refusing Blood or Blood Products  I understand refusal of blood or blood products as deemed necessary by my physician may have serious consequences to my condition to include possible death. I hereby assume responsibility for my refusal and release the hospital, its personnel, and my physicians from any responsibility for the consequences of my refusal.   [  ] Patient Refuses Blood      5.  I authorize the use of any specimen, organs, tissues, body parts or foreign objects that may be removed from my body during the procedure for diagnosis, research or teaching purposes and their subsequent disposal by hospital authorities. I also authorize the release of specimen test results and/or written reports to my treating physician on the hospital medical staff or other referring or consulting physicians involved in my care, at the discretion of the Pathologist or my treating physician.    6.  I consent to the photographing or videotaping of the procedures to be performed, including appropriate portions of my body for medical, scientific, or educational purposes, provided my identity is not revealed by the pictures or by descriptive texts accompanying them. If the procedure has been photographed/videotaped, the physician will obtain the original picture, image, videotape or CD. The hospital will not be responsible for storage, release or maintenance of the picture, image, tape or CD.   7.  I consent to the presence of a  or observers in the operating room as deemed necessary by my physician or their designees.    8.  I recognize that in the event my procedure results in extended X-Ray/fluoroscopy time, I may develop a skin reaction.    9.  If I have a Do Not Attempt Resuscitation (DNAR) order in place, that status will be  suspended while in the operating room, procedural suite, and during the recovery period unless otherwise explicitly stated by me (or a person authorized to consent on my behalf). The performing physician or my attending physician will determine when the applicable recovery period ends for purposes of reinstating the DNAR order.  10.  I acknowledge that my physician has explained sedation/analgesia administration to me including the risk and benefits I consent to the administration of sedation/analgesia as may be necessary or desirable in the judgment of my physician.      I CERTIFY THAT I HAVE READ AND FULLY UNDERSTAND THE ABOVE CONSENT FOR THE PROCEDURE.   Signature of Patient: _____________________________________________________________  Responsible person in case of minor, unconscious: ____________________________________  Relationship to patient:  __________________________________________________________  Signature of Witness: _______________________________Date: _________Time: __________    Statement of Physician: My signature below affirms that prior to the time of the procedure, I have explained to the patient and/or her guardian, the risks and benefits involved in the proposed treatment and any reasonable alternative to the proposed treatment. I have also explained the risks and benefits involved in the refusal of the proposed treatment and have answered the patient's questions. If I have a significant financial interest in a co-management agreement or a significant financial interest in any product or implant, or other significant relationship used in the procedure/surgery, I have disclosed this and had a discussion with my patient.  Signature of Physician:   _________________________________Date:_____________Time:________    Patient Name: Valeria Telles : 1960  Printed: 2025   Medical Record #: [id number]

## (undated) NOTE — LETTER
24      Patient: Valeria Telles  : 1960 Visit date: 2024    Dear Ralph,      I examined your patient in consultation today.    She has bilateral carpal tunnel syndrome, as well as left middle and left small finger triggering.    We will schedule her for left endoscopic carpal tunnel release with left middle and small finger trigger release, followed by right endoscopic carpal tunnel release.    Thank you for your kind referral. If I may answer any questions, please feel free to contact me.     Sincerely,   Barber Harrison MD     CC: No Recipients

## (undated) NOTE — MR AVS SNAPSHOT
[address], [address]  [address]  [phone]               Thank you for choosing us for your health care visit with Starla Black DO.   We are glad to serve you and happy to provide you with this summary

## (undated) NOTE — LETTER
AUTHORIZATION FOR SURGICAL OPERATION OR OTHER PROCEDURE    1.  I hereby authorize Dr. Leonor Abernathy, and [address] [place] [address] staff assigned to my case to perform the following operation and/or procedure at the [address] [place] [address]:    Right shoulder steroid inj Time:  ________ A. M.  P.M.        Patient Name:  ______________________________________________________  (please print)      Patient signature:  ___________________________________________________             Relationship to Patient:

## (undated) NOTE — Clinical Note
1/12/2017          To Whom It May Concern:    Arianna Wang is currently under my medical care and may not return to work at this time. Please excuse Geraldo Vargas for 1 days. She may return to work on 01/16/17. Activity is restricted as follows: none.     If yo

## (undated) NOTE — Clinical Note
Dear Nura,  I had the opportunity to see your patient Valeria Telles recently. I appreciate your confidence in me to care for your patients. Please feel free call me with any questions at [phone] or contact me through Epic.  Sincerely, Lee Griffith MD Board Certified, Physical Medicine and Rehabilitation Specializing in Sports Medicine, Spine Medicine and Electrodiagnostic Medicine [facility]

## (undated) NOTE — LETTER
25      Patient: Valeria Telles  : 1960 Visit date: 2025    Dear Ralph,      I examined your patient in consultation today.    She is 5 months post left endoscopic carpal tunnel release and left trigger release.  Her preoperative symptoms are gone and she has normal strength and function.    We will now plan on right endoscopic carpal tunnel release with right thumb and right ring finger trigger release.    Thank you for your kind referral. If I may answer any questions, please feel free to contact me.     Sincerely,   Barber Harrison MD     CC: No Recipients

## (undated) NOTE — Clinical Note
WHERE IS YOUR PAIN NOW?  Lina the areas on your body where you feel the described sensations.  Use the appropriate symbol.  Lina the areas of radiation.  Include all affected areas.  Just to complete the picture, please draw in the face.     ACHE:  ^ ^ ^   NUMBNESS:  0000   PINS & NEEDLES:  = = = =                              ^ ^ ^                       0000              = = = =                                    ^ ^ ^                       0000            = = = =      BURNING:  XXXX   STABBING: ////                  XXXX                ////                         XXXX          ////     Please lina the line below indicating your degree of pain right now  with 0 being no pain 10 being the worst pain possible.                                         0             1             2              3             4              5              6              7             8             9             10         Patient Signature:

## (undated) NOTE — LETTER
5/8/2024    Valeria Telles        [address] [address]            Dear Valeria Telles,      Our records indicate that you are due for an appointment for a Colonoscopy with Juan Johnson MD. Our doctors are booking out about 3-6 months in advance for procedures.     Please call our office to schedule a phone screening appointment to plan for the procedure(s).   Your medical well-being is important to us.    If your insurance requires a referral, please call your primary care office to request one.      Thank you,      The Physicians and Staff at [facility]

## (undated) NOTE — LETTER
Date: 2025      Patient Name: Valeria Telles      : 1960        Thank you for choosing [facility] as your health care provider. Your physician has deemed the following medical service(s) necessary. However, your insurance plan may not pay for all of your health care and costs and may deny payment for this service. The fact that your insurance plan does not pay for an item or service does not mean you should not receive it. The purpose of this form is to help you make an informed decision about whether or not you want to receive this service(s) that may not be paid for by your insurance plan.    CPT Code Description     Cost     Right Hip Orthovisc with 5cc Lidocaine under Ultrasound Guidance       _________ ______________________________ _____________      _________ ______________________________ _____________      I understand that the above mentioned service(s) or supply may not be covered by my insurance company. I agree to be financially responsible for the cost of this service or supply in the event of my insurance denies payment as a non-covered benefit.        ______________________________________________________________________  Signature of Patient or Patient's Representative  Relationship  Date    ______________________________________________________________________  Signature of Witness to signing of form   Printed Name

## (undated) NOTE — LETTER
AUTHORIZATION FOR SURGICAL OPERATION OR OTHER PROCEDURE    1. I hereby authorize Dr. Kenny Griffith and the [facility] staff assigned to my case to perform the following operation and/or procedure at the [facility]:    Right Hip Orthovisc with 5cc Lidocaine under Ultrasound Guidance       2.  My physician has explained the nature and purpose of the operation or other procedure, possible alternative methods of treatment, the risks involved, and the possibility of complication to me.  I acknowledge that no guarantee has been made as to the result that may be obtained.  3.  I recognize that, during the course of this operation, or other procedure, unforseen conditions may necessitate additional or different procedure than those listed above.  I, therefore, further authorize and request that the above named physician, his/her physician assistants or designees perform such procedures as are, in his/her professional opinion, necessary and desirable.  4.  Any tissue or organs removed in the operation or other procedure may be disposed of by and at the discretion of the [facility] staff and [facility].  5.  I understand that in the event of a medical emergency, I will be transported by local paramedics to [facility] or other hospital emergency department.  6.  I certify that I have read and fully understand the above consent to operation and/or other procedure.    7.  I acknowledge that my physician has explained sedation/analgesia administration to me including the risks and benefits.  I consent to the administration of sedation/analgesia as may be necessary or desirable in the judgement of my physician.    Witness signature: ___________________________________________________ Date:  ______/______/_____                    Time:  ________ A.M.  P.M.       Patient Name: Valeria Telles  9/11/1960  [id number]         Patient signature:   ___________________________________________________      Statement of Physician  My signature below affirms that prior to the time of the procedure, I have explained to the patient and/or his/her guardian, the risks and benefits involved in the proposed treatment and any reasonable alternative to the proposed treatment.  I have also explained the risks and benefits involved in the refusal of the proposed treatment and have answered the patient's questions.                        Date:  ______/______/_______  Provider                      Signature:  __________________________________________________________       Time:  ___________ ADARIAN FONSECA

## (undated) NOTE — LETTER
AUTHORIZATION FOR SURGICAL OPERATION OR OTHER PROCEDURE    1.  I hereby authorize Dr. Starla Black, and [facility] staff assigned to my case to perform the following operation and/or procedure at the [facility]:    __________ left shoulder s Date:  ______/______/_____                    Time:  ________ A. M.  P.M.        Patient Name:  ______________________________________________________  (please print)      Patient signature:  ___________________________________________________             Re

## (undated) NOTE — ED AVS SNAPSHOT
Pankaj Tyler   MRN: [id number]    Department:  [facility] Emergency Department   Date of Visit:  1/3/2018           Disclosure     Insurance plans vary and the physician(s) referred by the ER may not be covered by your plan.  Please contact your CARE PHYSICIAN AT ONCE OR RETURN IMMEDIATELY TO THE EMERGENCY DEPARTMENT. If you have been prescribed any medication(s), please fill your prescription right away and begin taking the medication(s) as directed.   If you believe that any of the medications

## (undated) NOTE — MR AVS SNAPSHOT
[address], [address]  [address] [facility]  [phone]               Thank you for choosing us for your health care visit with Colin Lewis MD.  We are glad to serve you and happy to provide you with this summ - guaiFENesin-codeine 100-10 MG/5ML Soln            Follow-up Instructions     Return if symptoms worsen or fail to improve. MyChart               Educational Information     Your blood pressure indicates you may be at-risk for Hypertension.    Ple active are less likely to develop some chronic diseases than adults who are inactive.      HOW TO GET STARTED: HOW TO STAY MOTIVATED:   Start activities slowly and build up over time Do what you like   Get your heart pumping – brisk walking, biking, swimmin

## (undated) NOTE — LETTER
December 10, 2018     [address] [facility]  [place] [address]      Dear Mirza Carter:    Below are the results from your recent visit: Your labs are so much better.  Keep up the great job with the weight loss.  Your liver tests and kidney tests a

## (undated) NOTE — LETTER
WHERE IS YOUR PAIN NOW?  Lina the areas on your body where you feel the described sensations.  Use the appropriate symbol.  Lina the areas of radiation.  Include all affected areas.  Just to complete the picture, please draw in the face.     ACHE:  ^ ^ ^   NUMBNESS:  0000   PINS & NEEDLES:  = = = =                              ^ ^ ^                       0000              = = = =                                    ^ ^ ^                       0000            = = = =      BURNING:  XXXX   STABBING: ////                  XXXX                ////                         XXXX          ////     Please lina the line below indicating your degree of pain right now  with 0 being no pain 10 being the worst pain possible.                                         0             1             2              3             4              5              6              7             8             9             10         Patient Signature:

## (undated) NOTE — LETTER
4/14/2021    Patient: Tiffany Kwon    MR Number: [id number]   YOB: 1960   Date of Visit: 204/12/2021       Dear Alexis Kramer DO     Thank you for referring Tiffany Kwon for ophthalmology examination.  She has a history of type 2 diabetes suzette

## (undated) NOTE — LETTER
[address]   Date:   9/11/2019     Name:   Dionicio Webb    YOB: 1960   MRN:   [id number]       [facility]? Elvis the areas on your body where you feel the described sensations.   Use the appropriate symbol

## (undated) NOTE — Clinical Note
Dear Nura,  I had the opportunity to see your patient Valeria Telles for an EMG recently. I appreciate your confidence in me to care for your patients. Please feel free call me with any questions at [phone] or contact me through Epic.  Sincerely, Lee Griffith MD Board Certified, Physical Medicine and Rehabilitation Specializing in Sports Medicine, Spine Medicine and Electrodiagnostic Medicine [facility]